# Patient Record
Sex: MALE | Race: WHITE | NOT HISPANIC OR LATINO | Employment: OTHER | URBAN - METROPOLITAN AREA
[De-identification: names, ages, dates, MRNs, and addresses within clinical notes are randomized per-mention and may not be internally consistent; named-entity substitution may affect disease eponyms.]

---

## 2017-08-12 ENCOUNTER — APPOINTMENT (OUTPATIENT)
Dept: CT IMAGING | Facility: CLINIC | Age: 37
End: 2017-08-12
Attending: EMERGENCY MEDICINE
Payer: COMMERCIAL

## 2017-08-12 ENCOUNTER — HOSPITAL ENCOUNTER (EMERGENCY)
Facility: CLINIC | Age: 37
Discharge: HOME | End: 2017-08-13
Attending: EMERGENCY MEDICINE | Admitting: EMERGENCY MEDICINE
Payer: COMMERCIAL

## 2017-08-12 DIAGNOSIS — R51.9 NONINTRACTABLE EPISODIC HEADACHE, UNSPECIFIED HEADACHE TYPE: ICD-10-CM

## 2017-08-12 PROCEDURE — 70450 CT HEAD/BRAIN W/O DYE: CPT

## 2017-08-12 PROCEDURE — 96372 THER/PROPH/DIAG INJ SC/IM: CPT

## 2017-08-12 PROCEDURE — 96361 HYDRATE IV INFUSION ADD-ON: CPT

## 2017-08-12 PROCEDURE — 96374 THER/PROPH/DIAG INJ IV PUSH: CPT

## 2017-08-12 PROCEDURE — 25000128 H RX IP 250 OP 636: Performed by: EMERGENCY MEDICINE

## 2017-08-12 PROCEDURE — 99285 EMERGENCY DEPT VISIT HI MDM: CPT | Mod: 25

## 2017-08-12 PROCEDURE — 96375 TX/PRO/DX INJ NEW DRUG ADDON: CPT

## 2017-08-12 RX ORDER — DIPHENHYDRAMINE HYDROCHLORIDE 50 MG/ML
25 INJECTION INTRAMUSCULAR; INTRAVENOUS ONCE
Status: COMPLETED | OUTPATIENT
Start: 2017-08-12 | End: 2017-08-12

## 2017-08-12 RX ORDER — METOCLOPRAMIDE HYDROCHLORIDE 5 MG/ML
10 INJECTION INTRAMUSCULAR; INTRAVENOUS ONCE
Status: COMPLETED | OUTPATIENT
Start: 2017-08-12 | End: 2017-08-12

## 2017-08-12 RX ORDER — KETOROLAC TROMETHAMINE 30 MG/ML
30 INJECTION, SOLUTION INTRAMUSCULAR; INTRAVENOUS ONCE
Status: COMPLETED | OUTPATIENT
Start: 2017-08-12 | End: 2017-08-12

## 2017-08-12 RX ADMIN — KETOROLAC TROMETHAMINE 30 MG: 30 INJECTION, SOLUTION INTRAMUSCULAR; INTRAVENOUS at 23:27

## 2017-08-12 RX ADMIN — METOCLOPRAMIDE 10 MG: 5 INJECTION, SOLUTION INTRAMUSCULAR; INTRAVENOUS at 23:27

## 2017-08-12 RX ADMIN — SODIUM CHLORIDE 1000 ML: 9 INJECTION, SOLUTION INTRAVENOUS at 23:26

## 2017-08-12 RX ADMIN — DIPHENHYDRAMINE HYDROCHLORIDE 25 MG: 50 INJECTION, SOLUTION INTRAMUSCULAR; INTRAVENOUS at 23:27

## 2017-08-12 NOTE — ED AVS SNAPSHOT
Emergency Department    [address] [facility] [address]    Phone:  [phone]    Fax:  [phone]                                       Carlos A Connor   MRN: [id number]    Department:   Emergency Department   Date of Visit:  8/12/2017           Patient Information     Date Of Birth          1980        Your diagnoses for this visit were:     Nonintractable episodic headache, unspecified headache type        You were seen by Johnathon Lr MD.      Follow-up Information     Follow up with Wegener, Joel Daniel Irwin, MD. Schedule an appointment as soon as possible for a visit in 1 week.    Specialty:  Family Practice    Contact information:    [address] [facility] [address] [phone]          Follow up with  Emergency Department.    Specialty:  EMERGENCY MEDICINE    Why:  If symptoms worsen    Contact information:    [address] [facility] [address] [phone]        Discharge Instructions          * HEADACHE [unspecified]    The cause of your headache today is not clear, but it does not appear to be the sign of any serious illness.  Under stress, some people tense the muscles of their shoulder, neck and scalp without knowing it. If this condition lasts long enough, a TENSION HEADACHE can occur.  A MIGRAINE HEADACHE is caused by changes in blood flow to the brain. It can be mild or severe. A migraine attack may be triggered by emotional stress, hormone changes during the menstrual cycle, oral contraceptives, alcohol use, certain foods containing tyramine, eye strain, weather changes, missing meals, lack of sleep or oversleeping.  Other causes of headache include a viral illness, sinus, ear or throat infection, dental pain and TMJ (jaw joint) pain.  HOME CARE:    If you were given pain medicine for this headache, do not drive yourself home. Arrange for a ride, instead. When you get home, try to sleep. You should feel much better when you wake  up.    If you are having nausea or vomiting, follow a light diet until your headache is relieved.    If you have a migraine type headache, use sunglasses when in the daylight or around bright indoor lighting until symptoms improve. Bright glaring light can worsen this kind of headache.  FOLLOW UP with your doctor if the headache is not better within the next 24 hours. If you have frequent headaches you should discuss a treatment plan with your primary care doctor. By being aware of the earliest signs of headache, and starting treatment right away, you may be able to stop the pain yourself.  GET PROMPT MEDICAL ATTENTION if any of the following occur:    Worsening of your head pain or no improvement within 24 hours    Repeated vomiting (unable to keep liquids down)    Fever over 101 F (38.3 C)    Stiff neck    Extreme drowsiness, confusion or fainting    Weakness of an arm or leg or one side of the face    Difficulty with speech or vision    [address] [place] [address]. All rights reserved. This information is not intended as a substitute for professional medical care. Always follow your healthcare professional's instructions.      24 Hour Appointment Hotline       To make an appointment at any [facility], call [phone] ([phone]). If you don't have a family doctor or clinic, we will help you find one. [facility] clinics are conveniently located to serve the needs of you and your family.             Review of your medicines      Our records show that you are taking the medicines listed below. If these are incorrect, please call your family doctor or clinic.        Dose / Directions Last dose taken    ibuprofen 200 MG tablet   Commonly known as:  ADVIL/MOTRIN   Dose:  200 mg   Quantity:  120 tablet        Take 1 tablet by mouth every 4 hours as needed for pain.   Refills:  0        LORazepam 0.5 MG tablet   Commonly known as:  ATIVAN        Refills:  0         oxyCODONE-acetaminophen 5-325 MG per tablet   Commonly known as:  PERCOCET   Dose:  1-2 tablet   Quantity:  25 tablet        Take 1-2 tablets by mouth every 4 hours as needed for pain   Refills:  0        PARoxetine 20 MG tablet   Commonly known as:  PAXIL   Dose:  20 mg   Quantity:  90 tablet        Take 1 tablet (20 mg) by mouth At Bedtime   Refills:  1        SUMAtriptan 25 MG tablet   Commonly known as:  IMITREX   Dose:  25-50 mg   Quantity:  9 tablet        Take 1-2 tablets (25-50 mg) by mouth at onset of headache for migraine May repeat dose in 2 hours.  Do not exceed 200 mg in 24 hours   Refills:  1                Procedures and tests performed during your visit     Head CT w/o contrast    Peripheral IV catheter      Orders Needing Specimen Collection     None      Pending Results     Date and Time Order Name Status Description    8/12/2017 2321 Head CT w/o contrast Preliminary             Pending Culture Results     No orders found for last 3 day(s).            Pending Results Instructions     If you had any lab results that were not finalized at the time of your Discharge, you can call the ED Lab Result RN at [phone]. You will be contacted by this team for any positive Lab results or changes in treatment. The nurses are available 7 days a week from 10A to 6:30P.  You can leave a message 24 hours per day and they will return your call.        Test Results From Your Hospital Stay        8/12/2017 11:57 PM      Narrative     CT HEAD W/O CONTRAST  8/12/2017 11:51 PM      HISTORY: Sudden head pain post lifting today.    TECHNIQUE: Routine noncontrast head CT. Radiation dose for this scan  was reduced using automated exposure control, adjustment of the mA  and/or kV according to patient size, or iterative reconstruction  technique.    COMPARISON: 3/24/2015.    FINDINGS: Brain volume is normal for age. No mass, mass effect or  intracranial hemorrhage. No evidence of acute infarct. The visualized  paranasal  sinuses are clear.        Impression     IMPRESSION: No acute abnormality.                Clinical Quality Measure: Blood Pressure Screening     Your blood pressure was checked while you were in the emergency department today. The last reading we obtained was  BP: (!) 135/94 . Please read the guidelines below about what these numbers mean and what you should do about them.  If your systolic blood pressure (the top number) is less than 120 and your diastolic blood pressure (the bottom number) is less than 80, then your blood pressure is normal. There is nothing more that you need to do about it.  If your systolic blood pressure (the top number) is 120-139 or your diastolic blood pressure (the bottom number) is 80-89, your blood pressure may be higher than it should be. You should have your blood pressure rechecked within a year by a primary care provider.  If your systolic blood pressure (the top number) is 140 or greater or your diastolic blood pressure (the bottom number) is 90 or greater, you may have high blood pressure. High blood pressure is treatable, but if left untreated over time it can put you at risk for heart attack, stroke, or kidney failure. You should have your blood pressure rechecked by a primary care provider within the next 4 weeks.  If your provider in the emergency department today gave you specific instructions to follow-up with your doctor or provider even sooner than that, you should follow that instruction and not wait for up to 4 weeks for your follow-up visit.        Thank you for choosing [facility]       Thank you for choosing [facility] for your care. Our goal is always to provide you with excellent care. Hearing back from our patients is one way we can continue to improve our services. Please take a few minutes to complete the written survey that you may receive in the mail after you visit with us. Thank you!        [facility] Information     [facility] lets you send messages to your doctor,  "view your test results, renew your prescriptions, schedule appointments and more. To sign up, go to [email or web address] . Click on \"Log in\" on the left side of the screen, which will take you to the Welcome page. Then click on \"Sign up Now\" on the right side of the page.     You will be asked to enter the access code listed below, as well as some personal information. Please follow the directions to create your username and password.     Your access code is: [email or web address]  Expires: 2017  1:06 AM     Your access code will  in 90 days. If you need help or a new code, please call your [place] clinic or [phone].        Care EveryWhere ID     This is your Care EveryWhere ID. This could be used by other organizations to access your [facility] medical records  [id number]        Equal Access to Services     HARPREET [facility] Center[facility]ESSEE : Harley Gonzalez, avani brennan, keith connolly, sierra bray . So [facility] [phone].    ATENCIÓN: Si habla español, tiene a corea disposición servicios gratuitos de asistencia lingüística. Llame al [phone].    We comply with applicable federal civil rights laws and [place] laws. We do not discriminate on the basis of race, color, national origin, age, disability sex, sexual orientation or gender identity.            After Visit Summary       This is your record. Keep this with you and show to your community pharmacist(s) and doctor(s) at your next visit.                  "

## 2017-08-12 NOTE — ED AVS SNAPSHOT
Emergency Department    [address] [facility] [address]    Phone:  [phone]    Fax:  [phone]                                       Carlos A Connor   MRN: [id number]    Department:   Emergency Department   Date of Visit:  8/12/2017           After Visit Summary Signature Page     I have received my discharge instructions, and my questions have been answered. I have discussed any challenges I see with this plan with the nurse or doctor.    ..........................................................................................................................................  Patient/Patient Representative Signature      ..........................................................................................................................................  Patient Representative Print Name and Relationship to Patient    ..................................................               ................................................  Date                                            Time    ..........................................................................................................................................  Reviewed by Signature/Title    ...................................................              ..............................................  Date                                                            Time

## 2017-08-13 VITALS
HEIGHT: 71 IN | SYSTOLIC BLOOD PRESSURE: 135 MMHG | WEIGHT: 205 LBS | OXYGEN SATURATION: 96 % | TEMPERATURE: 98.3 F | BODY MASS INDEX: 28.7 KG/M2 | RESPIRATION RATE: 16 BRPM | DIASTOLIC BLOOD PRESSURE: 94 MMHG

## 2017-08-13 PROCEDURE — 25000128 H RX IP 250 OP 636: Performed by: EMERGENCY MEDICINE

## 2017-08-13 RX ORDER — SUMATRIPTAN 6 MG/.5ML
6 INJECTION, SOLUTION SUBCUTANEOUS ONCE
Status: COMPLETED | OUTPATIENT
Start: 2017-08-13 | End: 2017-08-13

## 2017-08-13 RX ORDER — SUMATRIPTAN 6 MG/.5ML
6 INJECTION, SOLUTION SUBCUTANEOUS
Status: DISCONTINUED | OUTPATIENT
Start: 2017-08-13 | End: 2017-08-13 | Stop reason: HOSPADM

## 2017-08-13 RX ADMIN — SUMATRIPTAN SUCCINATE 6 MG: 6 INJECTION SUBCUTANEOUS at 00:28

## 2017-08-13 ASSESSMENT — ENCOUNTER SYMPTOMS
WEAKNESS: 0
NECK PAIN: 0
NAUSEA: 0
VOMITING: 0
HEADACHES: 1
LIGHT-HEADEDNESS: 0
PHOTOPHOBIA: 1
FEVER: 0
NUMBNESS: 0
DIZZINESS: 0
SPEECH DIFFICULTY: 0

## 2017-08-13 NOTE — ED PROVIDER NOTES
"  History     Chief Complaint:  Headache     HPI   Carlos A Connor is a 36 year old male with a history of migraines who presents for evaluation of a headache. The patient reports he was lifting something heavy at work this morning when he developed a pressure in his head and sensation of \"something cracking inside his head\". The headache has been gradually worsening throughout the day and he is concerned that it started while he was lifting something heavy, prompting him to come to the ED for evaluation. On arrival to the ED, the patient reports he has a frontal headache and pain across the back of his head. He has a history of migraines but reports this feels different from his typical migraine. The patient notes mild photophobia. He denies any nausea or vomiting. He denies any numbness, weakness, tingling, neck pain, vision changes, or balance issues. The patient took Ibuprofen this morning without significant relief. He denies any family history of aneurysm or head bleed. He is not anticoagulated.     Allergies:  Droperidol     Medications:    LORazepam (ATIVAN) 0.5 MG tablet  SUMAtriptan (IMITREX) 25 MG tablet  PARoxetine (PAXIL) 20 MG tablet  ibuprofen (ADVIL,MOTRIN) 200 MG tablet    Past Medical History:    Anxiety  Chest pain  Migraines    Past Surgical History:    History reviewed. No pertinent surgical history.    Family History:    History reviewed. No pertinent family history.     Social History:  Smoking status: Former smoker  Alcohol use: Rare  Marital Status:   [2]     Review of Systems   Constitutional: Negative for fever.   Eyes: Positive for photophobia. Negative for visual disturbance.   Gastrointestinal: Negative for nausea and vomiting.   Musculoskeletal: Negative for gait problem and neck pain.   Neurological: Positive for headaches. Negative for dizziness, speech difficulty, weakness, light-headedness and numbness.   All other systems reviewed and are negative.      Physical Exam " "  Patient Vitals for the past 24 hrs:   BP Temp Temp src Heart Rate Resp SpO2 Height Weight   08/13/17 0107 - - - - 16 - - -   08/13/17 0100 - - - - - 96 % - -   08/13/17 0048 (!) 135/94 - - - - 96 % - -   08/12/17 2250 (!) 150/102 98.3  F (36.8  C) Temporal 89 - 98 % 1.803 m (5' 11\") 93 kg (205 lb)       Physical Exam  Constitutional:  Appears well-developed and well-nourished. Cooperative.   HENT:   Head:    Atraumatic.   Mouth/Throat:   Oropharynx is without erythema or exudate and mucous     membranes are moist.   Eyes:    Conjunctivae normal and EOM are normal.      Pupils are equal, round, and reactive to light.   Neck:    Normal range of motion. Neck supple. No nuchal rigidity.   Cardiovascular:  Normal rate, regular rhythm, normal heart sounds and radial and    dorsalis pedis pulses are 2+ and symmetric.    Pulmonary/Chest:  Effort normal and breath sounds normal.   Abdominal:   Soft. Bowel sounds are normal.      No splenomegaly or hepatomegaly. No tenderness. No rebound.   Musculoskeletal:  Normal range of motion. No edema and no tenderness.   Neurological:  Alert. Normal strength. No cranial nerve deficit. GCS 15.     No photophobia. 5/5 strength in upper and lower extremities bilatearlly. Normal speech.   Skin:    Skin is warm and dry.   Psychiatric:   Normal mood and affect.     Emergency Department Course   Imaging:  Radiographic findings were communicated with the patient who voiced understanding of the findings.    CT Head w/o contrast  No acute abnormality   As read by Radiology.    Interventions:  2327: Toradol 30 mg IV  2327: NS 1L IV Bolus  2327: Reglan 10 mg IV  2327: Benadryl 25 mg IV  0028: Imitrex 6 mg subcutaneous     Emergency Department Course:  Past medical records, nursing notes, and vitals reviewed.  2314: I performed an exam of the patient and obtained history, as documented above.  The patient was sent for a head CT while in the emergency department, findings above.    0103: I " rechecked the patient. Explained findings to the patient. He is feeling improved.     I rechecked the patient.  Findings and plan explained to the Patient. Patient discharged home with instructions regarding supportive care, medications, and reasons to return. The importance of close follow-up was reviewed.     Impression & Plan      Medical Decision Making:  Carlos A Connor is a 36 year old male presented with a new onset headache. Headache started while the patient was lifting something heavy, so head CT was obtained due to concern for possible intracranial hemorrhage. There is no neck pain to suggest dissection.  CT did not demonstrate a mass, aneurysm, or stroke.  Evaluation in the emergency department, fortunately has been negative. The patient has not had any fever or neck stiffness so I doubt meningitis.  There is no associated numbness, paresthesia or confusion and I doubt stroke or CNS tumor. The patient was treated symptomatically and pain has improved with medication interventions.      As the patient has been improving and in light of the negative workup, the patient wishes to be discharged home. The patient should follow-up with the primary physician within 3 days. If the headache continues or the frequency increases, consultation with neurology will be indicated.  Return if increasing pain, fever, vomiting or weakness.  Full anticipatory guidance given prior to discharge.    Diagnosis:    ICD-10-CM   1. Nonintractable episodic headache, unspecified headache type R51     Disposition: Discharged to home    Pina Almonte  8/12/2017    EMERGENCY DEPARTMENT    I, Pina Almonte, am serving as a scribe at 11:14 PM on 8/12/2017 to document services personally performed by Johnathon Lr MD based on my observations and the provider's statements to me.        Johnathon Lr MD  08/13/17 0721

## 2017-08-13 NOTE — DISCHARGE INSTRUCTIONS
* HEADACHE [unspecified]    The cause of your headache today is not clear, but it does not appear to be the sign of any serious illness.  Under stress, some people tense the muscles of their shoulder, neck and scalp without knowing it. If this condition lasts long enough, a TENSION HEADACHE can occur.  A MIGRAINE HEADACHE is caused by changes in blood flow to the brain. It can be mild or severe. A migraine attack may be triggered by emotional stress, hormone changes during the menstrual cycle, oral contraceptives, alcohol use, certain foods containing tyramine, eye strain, weather changes, missing meals, lack of sleep or oversleeping.  Other causes of headache include a viral illness, sinus, ear or throat infection, dental pain and TMJ (jaw joint) pain.  HOME CARE:    If you were given pain medicine for this headache, do not drive yourself home. Arrange for a ride, instead. When you get home, try to sleep. You should feel much better when you wake up.    If you are having nausea or vomiting, follow a light diet until your headache is relieved.    If you have a migraine type headache, use sunglasses when in the daylight or around bright indoor lighting until symptoms improve. Bright glaring light can worsen this kind of headache.  FOLLOW UP with your doctor if the headache is not better within the next 24 hours. If you have frequent headaches you should discuss a treatment plan with your primary care doctor. By being aware of the earliest signs of headache, and starting treatment right away, you may be able to stop the pain yourself.  GET PROMPT MEDICAL ATTENTION if any of the following occur:    Worsening of your head pain or no improvement within 24 hours    Repeated vomiting (unable to keep liquids down)    Fever over 101 F (38.3 C)    Stiff neck    Extreme drowsiness, confusion or fainting    Weakness of an arm or leg or one side of the face    Difficulty with speech or vision    [address]  [place] [address]. All rights reserved. This information is not intended as a substitute for professional medical care. Always follow your healthcare professional's instructions.

## 2018-04-12 ENCOUNTER — HOSPITAL ENCOUNTER (EMERGENCY)
Facility: CLINIC | Age: 38
Discharge: HOME | End: 2018-04-12
Attending: EMERGENCY MEDICINE | Admitting: EMERGENCY MEDICINE
Payer: COMMERCIAL

## 2018-04-12 VITALS
DIASTOLIC BLOOD PRESSURE: 93 MMHG | TEMPERATURE: 98.8 F | BODY MASS INDEX: 29.4 KG/M2 | OXYGEN SATURATION: 95 % | SYSTOLIC BLOOD PRESSURE: 132 MMHG | RESPIRATION RATE: 20 BRPM | WEIGHT: 210 LBS | HEIGHT: 71 IN

## 2018-04-12 DIAGNOSIS — R11.10 VOMITING AND DIARRHEA: ICD-10-CM

## 2018-04-12 DIAGNOSIS — R19.7 VOMITING AND DIARRHEA: ICD-10-CM

## 2018-04-12 LAB
ALBUMIN SERPL-MCNC: 3.9 G/DL (ref 3.4–5)
ALP SERPL-CCNC: 86 U/L (ref 40–150)
ALT SERPL W P-5'-P-CCNC: 88 U/L (ref 0–70)
ANION GAP SERPL CALCULATED.3IONS-SCNC: 9 MMOL/L (ref 3–14)
AST SERPL W P-5'-P-CCNC: 36 U/L (ref 0–45)
BILIRUB SERPL-MCNC: 1.1 MG/DL (ref 0.2–1.3)
BUN SERPL-MCNC: 8 MG/DL (ref 7–30)
CALCIUM SERPL-MCNC: 8.5 MG/DL (ref 8.5–10.1)
CHLORIDE SERPL-SCNC: 106 MMOL/L (ref 94–109)
CO2 SERPL-SCNC: 24 MMOL/L (ref 20–32)
CREAT SERPL-MCNC: 0.64 MG/DL (ref 0.66–1.25)
ERYTHROCYTE [DISTWIDTH] IN BLOOD BY AUTOMATED COUNT: 13.1 % (ref 10–15)
GFR SERPL CREATININE-BSD FRML MDRD: >90 ML/MIN/1.7M2
GLUCOSE SERPL-MCNC: 103 MG/DL (ref 70–99)
HCT VFR BLD AUTO: 41.7 % (ref 40–53)
HGB BLD-MCNC: 14.3 G/DL (ref 13.3–17.7)
LIPASE SERPL-CCNC: 139 U/L (ref 73–393)
MCH RBC QN AUTO: 29.3 PG (ref 26.5–33)
MCHC RBC AUTO-ENTMCNC: 34.3 G/DL (ref 31.5–36.5)
MCV RBC AUTO: 86 FL (ref 78–100)
PLATELET # BLD AUTO: 185 10E9/L (ref 150–450)
POTASSIUM SERPL-SCNC: 3.3 MMOL/L (ref 3.4–5.3)
PROT SERPL-MCNC: 7.7 G/DL (ref 6.8–8.8)
RBC # BLD AUTO: 4.88 10E12/L (ref 4.4–5.9)
SODIUM SERPL-SCNC: 139 MMOL/L (ref 133–144)
WBC # BLD AUTO: 5.7 10E9/L (ref 4–11)

## 2018-04-12 PROCEDURE — 96374 THER/PROPH/DIAG INJ IV PUSH: CPT

## 2018-04-12 PROCEDURE — 83690 ASSAY OF LIPASE: CPT | Performed by: EMERGENCY MEDICINE

## 2018-04-12 PROCEDURE — 80053 COMPREHEN METABOLIC PANEL: CPT | Performed by: EMERGENCY MEDICINE

## 2018-04-12 PROCEDURE — 25000128 H RX IP 250 OP 636: Performed by: EMERGENCY MEDICINE

## 2018-04-12 PROCEDURE — 25000132 ZZH RX MED GY IP 250 OP 250 PS 637: Performed by: EMERGENCY MEDICINE

## 2018-04-12 PROCEDURE — 99284 EMERGENCY DEPT VISIT MOD MDM: CPT | Mod: 25

## 2018-04-12 PROCEDURE — 85027 COMPLETE CBC AUTOMATED: CPT | Performed by: EMERGENCY MEDICINE

## 2018-04-12 PROCEDURE — 96361 HYDRATE IV INFUSION ADD-ON: CPT

## 2018-04-12 RX ORDER — ONDANSETRON 4 MG/1
4 TABLET, ORALLY DISINTEGRATING ORAL EVERY 8 HOURS PRN
Qty: 10 TABLET | Refills: 0 | Status: SHIPPED | OUTPATIENT
Start: 2018-04-12 | End: 2018-04-15

## 2018-04-12 RX ORDER — POTASSIUM CHLORIDE 1500 MG/1
40 TABLET, EXTENDED RELEASE ORAL ONCE
Status: COMPLETED | OUTPATIENT
Start: 2018-04-12 | End: 2018-04-12

## 2018-04-12 RX ORDER — ONDANSETRON 2 MG/ML
4 INJECTION INTRAMUSCULAR; INTRAVENOUS ONCE
Status: COMPLETED | OUTPATIENT
Start: 2018-04-12 | End: 2018-04-12

## 2018-04-12 RX ADMIN — POTASSIUM CHLORIDE 40 MEQ: 1500 TABLET, EXTENDED RELEASE ORAL at 15:33

## 2018-04-12 RX ADMIN — ONDANSETRON 4 MG: 2 INJECTION INTRAMUSCULAR; INTRAVENOUS at 14:59

## 2018-04-12 RX ADMIN — SODIUM CHLORIDE 1000 ML: 9 INJECTION, SOLUTION INTRAVENOUS at 14:44

## 2018-04-12 ASSESSMENT — ENCOUNTER SYMPTOMS
HEADACHES: 1
NAUSEA: 1
FEVER: 1
DIARRHEA: 1
CHILLS: 1
DIZZINESS: 1
ABDOMINAL PAIN: 1
VOMITING: 1
DYSURIA: 0

## 2018-04-12 NOTE — ED AVS SNAPSHOT
Emergency Department    [address] [facility] [address]    Phone:  [phone]    Fax:  [phone]                                       Carlos A Connor   MRN: [id number]    Department:   Emergency Department   Date of Visit:  4/12/2018           Patient Information     Date Of Birth          1980        Your diagnoses for this visit were:     Vomiting and diarrhea        You were seen by Basia Bravo MD.      Follow-up Information     Schedule an appointment as soon as possible for a visit with Wegener, Joel Daniel Irwin, MD.    Specialty:  Family Practice    Contact information:    [address] [facility] [address] [phone]          Follow up with  Emergency Department.    Specialty:  EMERGENCY MEDICINE    Why:  If symptoms worsen    Contact information:    [address] [facility] [address] [phone]        Discharge Instructions       Use zofran for nausea  Keep hydrated at home  Can use imodium (over the counter) medicine for diarrhea if severe    Discharge Instructions  Vomiting    You have been seen today for vomiting (throwing up). This is usually caused by a virus, but some bacteria, parasites, medicines or other medical conditions can cause similar symptoms. At this time your provider does not find that your vomiting is a sign of anything dangerous or life-threatening. However, sometimes the signs of serious illness do not show up right away. If you have new or worse symptoms, you may need to be seen again in the Emergency Department or by your primary provider. Remember that serious problems like appendicitis can start as vomiting.    Generally, every Emergency Department visit should have a follow-up clinic visit with either a primary or a specialty clinic/provider. Please follow-up as instructed by your emergency provider today.    Return to the Emergency Department if:    You keep vomiting and you are not able to keep liquids  down.     You feel you are getting dehydrated, such as being very thirsty, not urinating (peeing) at least every 8-12 hours, or feeling faint or lightheaded.     You develop a new fever, or your fever continues for more than 2 days.     You have abdominal (belly pain) that seems worse than cramps, is in one spot, or is getting worse over time. Appendicitis usually causes pain in the right lower abdomen (to the right and below your belly button) so watch for pain in this location.    You have blood in your vomit or stools.     You feel very weak.    You are not starting to improve within 24 hours of your visit here.     What can I do to help myself?    The most important thing to do is to drink clear liquids. If you have been vomiting a lot, it is best to have only small, frequent sips of liquids. Drinking too much at once may cause more vomiting. If you are vomiting often, you must replace minerals, sodium and potassium lost with your illness. Pedialyte  is the best available rehydration liquid but some find that it doesn t taste good so sports drinks are an alterative. You can also drink clear liquids such as water, weak tea, apple juice, and 7-Up . Avoid acid liquids (orange), caffeine (coffee) or alcohol. Do not drink milk until you no longer have diarrhea (loose stools).     After liquids are staying down, you may start eating mild foods. Soda crackers, toast, plain noodles, gelatin, applesauce and bananas are good first choices. Avoid foods that have acid, are spicy, fatty or have a lot of fiber (such as meats, coarse grains, vegetables). You may start eating these foods again in about 3 days when you are better.     Sometimes treatment includes prescription medicine to prevent nausea (sick to your stomach) and vomiting. If your provider prescribes these for you, take them as directed.     Do not take ibuprofen, naproxen, or other nonsteroidal anti-inflammatory (NSAID) medicines without checking with your  healthcare provider.     If you were given a prescription for medicine here today, be sure to read all of the information (including the package insert) that comes with your prescription.  This will include important information about the medicine, its side effects, and any warnings that you need to know about.  The pharmacist who fills the prescription can provide more information and answer questions you may have about the medicine.  If you have questions or concerns that the pharmacist cannot address, please call or return to the Emergency Department.     Remember that you can always come back to the Emergency Department if you are not able to see your regular provider in the amount of time listed above, if you get any new symptoms, or if there is anything that worries you.      Your next 10 appointments already scheduled     Apr 18, 2018  3:00 PM CDT   Office Visit with TERRA Cabrera CNP   [facility]    [address] [place] [address] [phone]           Bring a current list of meds and any records pertaining to this visit. For Physicals, please bring immunization records and any forms needing to be filled out. Please arrive 10 minutes early to complete paperwork.              24 Hour Appointment Hotline       To make an appointment at any [facility], call [phone] ([phone]). If you don't have a family doctor or clinic, we will help you find one. [facility] are conveniently located to serve the needs of you and your family.             Review of your medicines      START taking        Dose / Directions Last dose taken    ondansetron 4 MG ODT tab   Commonly known as:  ZOFRAN ODT   Dose:  4 mg   Quantity:  10 tablet        Take 1 tablet (4 mg) by mouth every 8 hours as needed   Refills:  0          Our records show that you are taking the medicines listed below. If these are incorrect, please call your family  doctor or clinic.        Dose / Directions Last dose taken    TYLENOL 325 MG Caps   Generic drug:  Acetaminophen        Refills:  0                Prescriptions were sent or printed at these locations (1 Prescription)                   Other Prescriptions                Printed at Department/Unit printer (1 of 1)         ondansetron (ZOFRAN ODT) 4 MG ODT tab                Procedures and tests performed during your visit     CBC (+ platelets, no diff)    Comprehensive metabolic panel    Lipase      Orders Needing Specimen Collection     None      Pending Results     No orders found from 4/10/2018 to 4/13/2018.            Pending Culture Results     No orders found from 4/10/2018 to 4/13/2018.            Pending Results Instructions     If you had any lab results that were not finalized at the time of your Discharge, you can call the ED Lab Result RN at [phone]. You will be contacted by this team for any positive Lab results or changes in treatment. The nurses are available 7 days a week from 10A to 6:30P.  You can leave a message 24 hours per day and they will return your call.        Test Results From Your Hospital Stay        4/12/2018  2:59 PM      Component Results     Component Value Ref Range & Units Status    WBC 5.7 4.0 - 11.0 10e9/L Final    RBC Count 4.88 4.4 - 5.9 10e12/L Final    Hemoglobin 14.3 13.3 - 17.7 g/dL Final    Hematocrit 41.7 40.0 - 53.0 % Final    MCV 86 78 - 100 fl Final    MCH 29.3 26.5 - 33.0 pg Final    MCHC 34.3 31.5 - 36.5 g/dL Final    RDW 13.1 10.0 - 15.0 % Final    Platelet Count 185 150 - 450 10e9/L Final         4/12/2018  3:15 PM      Component Results     Component Value Ref Range & Units Status    Sodium 139 133 - 144 mmol/L Final    Potassium 3.3 (L) 3.4 - 5.3 mmol/L Final    Chloride 106 94 - 109 mmol/L Final    Carbon Dioxide 24 20 - 32 mmol/L Final    Anion Gap 9 3 - 14 mmol/L Final    Glucose 103 (H) 70 - 99 mg/dL Final    Urea Nitrogen 8 7 - 30 mg/dL Final     Creatinine 0.64 (L) 0.66 - 1.25 mg/dL Final    GFR Estimate >90 >60 mL/min/1.7m2 Final    Non  GFR Calc    GFR Estimate If Black >90 >60 mL/min/1.7m2 Final    African American GFR Calc    Calcium 8.5 8.5 - 10.1 mg/dL Final    Bilirubin Total 1.1 0.2 - 1.3 mg/dL Final    Albumin 3.9 3.4 - 5.0 g/dL Final    Protein Total 7.7 6.8 - 8.8 g/dL Final    Alkaline Phosphatase 86 40 - 150 U/L Final    ALT 88 (H) 0 - 70 U/L Final    AST 36 0 - 45 U/L Final         4/12/2018  3:13 PM      Component Results     Component Value Ref Range & Units Status    Lipase 139 73 - 393 U/L Final                Clinical Quality Measure: Blood Pressure Screening     Your blood pressure was checked while you were in the emergency department today. The last reading we obtained was  BP: (!) 132/93 . Please read the guidelines below about what these numbers mean and what you should do about them.  If your systolic blood pressure (the top number) is less than 120 and your diastolic blood pressure (the bottom number) is less than 80, then your blood pressure is normal. There is nothing more that you need to do about it.  If your systolic blood pressure (the top number) is 120-139 or your diastolic blood pressure (the bottom number) is 80-89, your blood pressure may be higher than it should be. You should have your blood pressure rechecked within a year by a primary care provider.  If your systolic blood pressure (the top number) is 140 or greater or your diastolic blood pressure (the bottom number) is 90 or greater, you may have high blood pressure. High blood pressure is treatable, but if left untreated over time it can put you at risk for heart attack, stroke, or kidney failure. You should have your blood pressure rechecked by a primary care provider within the next 4 weeks.  If your provider in the emergency department today gave you specific instructions to follow-up with your doctor or provider even sooner than that, you should  "follow that instruction and not wait for up to 4 weeks for your follow-up visit.        Thank you for choosing [facility]       Thank you for choosing [facility] for your care. Our goal is always to provide you with excellent care. Hearing back from our patients is one way we can continue to improve our services. Please take a few minutes to complete the written survey that you may receive in the mail after you visit with us. Thank you!        [employer] Information     [employer] lets you send messages to your doctor, view your test results, renew your prescriptions, schedule appointments and more. To sign up, go to [email or web address] . Click on \"Log in\" on the left side of the screen, which will take you to the Welcome page. Then click on \"Sign up Now\" on the right side of the page.     You will be asked to enter the access code listed below, as well as some personal information. Please follow the directions to create your username and password.     Your access code is: [email or web address]  Expires: 2018  3:37 PM     Your access code will  in 90 days. If you need help or a new code, please call your [facility] clinic or [phone].        Care EveryWhere ID     This is your Care EveryWhere ID. This could be used by other organizations to access your [facility] medical records  [id number]        Equal Access to Services     ALYSIA CLIFFORD : Harley Gonzalez, waaxda luqadaha, qaybta kaalmada mohsen, sierra bray . So [facility] [phone].    ATENCIÓN: Si habla español, tiene a corea disposición servicios gratuitos de asistencia lingüística. Llame al [phone].    We comply with applicable federal civil rights laws and [place] laws. We do not discriminate on the basis of race, color, national origin, age, disability, sex, sexual orientation, or gender identity.            After Visit Summary       This is your record. Keep this with you and show to your community pharmacist(s) " and doctor(s) at your next visit.

## 2018-04-12 NOTE — DISCHARGE INSTRUCTIONS
Use zofran for nausea  Keep hydrated at home  Can use imodium (over the counter) medicine for diarrhea if severe    Discharge Instructions  Vomiting    You have been seen today for vomiting (throwing up). This is usually caused by a virus, but some bacteria, parasites, medicines or other medical conditions can cause similar symptoms. At this time your provider does not find that your vomiting is a sign of anything dangerous or life-threatening. However, sometimes the signs of serious illness do not show up right away. If you have new or worse symptoms, you may need to be seen again in the Emergency Department or by your primary provider. Remember that serious problems like appendicitis can start as vomiting.    Generally, every Emergency Department visit should have a follow-up clinic visit with either a primary or a specialty clinic/provider. Please follow-up as instructed by your emergency provider today.    Return to the Emergency Department if:    You keep vomiting and you are not able to keep liquids down.     You feel you are getting dehydrated, such as being very thirsty, not urinating (peeing) at least every 8-12 hours, or feeling faint or lightheaded.     You develop a new fever, or your fever continues for more than 2 days.     You have abdominal (belly pain) that seems worse than cramps, is in one spot, or is getting worse over time. Appendicitis usually causes pain in the right lower abdomen (to the right and below your belly button) so watch for pain in this location.    You have blood in your vomit or stools.     You feel very weak.    You are not starting to improve within 24 hours of your visit here.     What can I do to help myself?    The most important thing to do is to drink clear liquids. If you have been vomiting a lot, it is best to have only small, frequent sips of liquids. Drinking too much at once may cause more vomiting. If you are vomiting often, you must replace minerals, sodium and  potassium lost with your illness. Pedialyte  is the best available rehydration liquid but some find that it doesn t taste good so sports drinks are an alterative. You can also drink clear liquids such as water, weak tea, apple juice, and 7-Up . Avoid acid liquids (orange), caffeine (coffee) or alcohol. Do not drink milk until you no longer have diarrhea (loose stools).     After liquids are staying down, you may start eating mild foods. Soda crackers, toast, plain noodles, gelatin, applesauce and bananas are good first choices. Avoid foods that have acid, are spicy, fatty or have a lot of fiber (such as meats, coarse grains, vegetables). You may start eating these foods again in about 3 days when you are better.     Sometimes treatment includes prescription medicine to prevent nausea (sick to your stomach) and vomiting. If your provider prescribes these for you, take them as directed.     Do not take ibuprofen, naproxen, or other nonsteroidal anti-inflammatory (NSAID) medicines without checking with your healthcare provider.     If you were given a prescription for medicine here today, be sure to read all of the information (including the package insert) that comes with your prescription.  This will include important information about the medicine, its side effects, and any warnings that you need to know about.  The pharmacist who fills the prescription can provide more information and answer questions you may have about the medicine.  If you have questions or concerns that the pharmacist cannot address, please call or return to the Emergency Department.     Remember that you can always come back to the Emergency Department if you are not able to see your regular provider in the amount of time listed above, if you get any new symptoms, or if there is anything that worries you.

## 2018-04-12 NOTE — ED AVS SNAPSHOT
Emergency Department    [address] [facility] [address]    Phone:  [phone]    Fax:  [phone]                                       Carlos A Connor   MRN: [id number]    Department:   Emergency Department   Date of Visit:  4/12/2018           After Visit Summary Signature Page     I have received my discharge instructions, and my questions have been answered. I have discussed any challenges I see with this plan with the nurse or doctor.    ..........................................................................................................................................  Patient/Patient Representative Signature      ..........................................................................................................................................  Patient Representative Print Name and Relationship to Patient    ..................................................               ................................................  Date                                            Time    ..........................................................................................................................................  Reviewed by Signature/Title    ...................................................              ..............................................  Date                                                            Time

## 2018-04-12 NOTE — ED PROVIDER NOTES
"  History     Chief Complaint:  Abdominal Pain     The history is provided by the patient.      Carlos A Connor is a 37 year old male who presents with abdominal pain. The patient developed a chills and pain in the left side of his abdomen 2 days ago. He also had vomiting and diarrhea, with approximately 10 episodes of diarrhea since the onset of his symptoms. The patient also endorses associated chills, headache, and dizziness. He states that he has been able to keep down fluids but he has had a decreased appetite. 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 He denies any dysuria, recent antibiotic use, recent travel, or other medical concerns.     Allergies:  Droperidol      Medications:    The patient is not currently taking any prescribed medications.     Past Medical History:    Anxiety  Migraines    Past Surgical History:    History reviewed. No pertinent surgical history.     Family History:    History reviewed. No pertinent family history.      Social History:  Presents alone   Tobacco use: Former smoker  Alcohol use: Occasional  PCP: Joel Daniel Wegener    Marital Status:      Review of Systems   Constitutional: Positive for chills and fever (Subjective).   Gastrointestinal: Positive for abdominal pain, diarrhea, nausea and vomiting.   Genitourinary: Negative for dysuria.   Neurological: Positive for dizziness and headaches.   All other systems reviewed and are negative.    Physical Exam     Patient Vitals for the past 24 hrs:   BP Temp Temp src Heart Rate Resp SpO2 Height Weight   04/12/18 1535 (!) 132/93 - - 81 20 - - -   04/12/18 1500 - - - 74 16 95 % - -   04/12/18 1445 138/90 - - 63 - 96 % - -   04/12/18 1249 135/84 98.8  F (37.1  C) Oral 76 18 97 % 1.803 m (5' 11\") 95.3 kg (210 lb)      Physical Exam  General: Resting comfortably on the gurney  Eyes:  The pupils are equal and round    Conjunctivae " and sclerae are normal  ENT:    Moist mucous membranes  Neck:  Normal range of motion  CV:  Regular rate and rhythm    Skin warm and well perfused   Resp:  Lungs are clear    Non-labored    No rales    No wheezing   GI:  Abdomen is soft, there is no rigidity, no tenderness    No distension    No rebound tenderness, no guarding    No abdominal tenderness  MS:  Normal muscular tone  Skin:  No rash or acute skin lesions noted  Neuro:   Awake, alert.      Speech is normal and fluent.    Face is symmetric.     Moves all extremities  Psych:  Normal affect.  Appropriate interactions.     Emergency Department Course   Laboratory:  CBC: WNL (WBC 5.7, HGB 14.3, )    CMP: Creatinine 0.64 (L), Glucose 103 (H), Potassium 3.3 (L), ALT 88 (H), o/w WNL   Lipase: 139     Interventions:  1444: NS 1L IV Bolus   1459: Zofran 4mg IV injection    1533: K-dur 40 mEq PO    Emergency Department Course:  Past medical records, nursing notes, and vitals reviewed.  1448: I performed an exam of the patient and obtained history, as documented above.     1518: I rechecked the patient. Patient is feeling improved and nausea resolved. Findings and plan explained to the Patient. Patient discharged home with instructions regarding supportive care, medications, and reasons to return. The importance of close follow-up was reviewed.      I personally reviewed the laboratory results with the Patient and answered all related questions prior to discharge.      Impression & Plan    Medical Decision Making:  Carlos A Connor is a 37 year old male who presented to the emergency department with abdominal pain. No abdominal tenderness of exam and his vital signs are normal. I suspect that this is likely a viral illness given that he had exposure to a coworker who had the same symptoms. He is already starting to feel better than yesterday. No abdominal tenderness on exam so unlikely appendicitis, bowel obstruction, diverticulitis, cholecystitis, or  other acute intraabdominal pathology. Laboratory studies obtained and slight hypokalemia and slightly elevated ALT. Doubt gallbladder/liver pathology. Patient is feeling better here in the emergency department and the nausea resolved and he is able to tolerate oral intact. Given no abdominal tenderness on exam, don't think advanced imaging is indicated. Recommended symptomatic treatment at home and follow up with primary care provider if not improving. Reasons to return to the ED were discussed with the patient.    Diagnosis:    ICD-10-CM    1. Vomiting and diarrhea R11.10     R19.7        Disposition:  Discharged to home with plan as outlined.    Discharge Medications:  Discharge Medication List as of 4/12/2018  3:37 PM      START taking these medications    Details   ondansetron (ZOFRAN ODT) 4 MG ODT tab Take 1 tablet (4 mg) by mouth every 8 hours as needed, Disp-10 tablet, R-0, Local Print             Manuel Winn  4/12/2018    EMERGENCY DEPARTMENT  ONDINA, Manuel Winn, am serving as a scribe at 2:48 PM on 4/12/2018 to document services personally performed by Basia Bravo MD based on my observations and the provider's statements to me.       Basia Bravo MD  04/13/18 0033

## 2018-05-19 ENCOUNTER — HOSPITAL ENCOUNTER (OUTPATIENT)
Facility: CLINIC | Age: 38
Setting detail: OBSERVATION
Discharge: HOME | End: 2018-05-20
Attending: EMERGENCY MEDICINE | Admitting: HOSPITALIST
Payer: COMMERCIAL

## 2018-05-19 DIAGNOSIS — R00.0 TACHYCARDIA: ICD-10-CM

## 2018-05-19 DIAGNOSIS — F19.10 SUBSTANCE ABUSE (H): ICD-10-CM

## 2018-05-19 PROBLEM — F14.10 COCAINE ABUSE (H): Status: ACTIVE | Noted: 2018-05-19

## 2018-05-19 LAB
ALBUMIN SERPL-MCNC: 4.4 G/DL (ref 3.4–5)
ALCOHOL BREATH TEST: 0 (ref 0–0.01)
ALP SERPL-CCNC: 99 U/L (ref 40–150)
ALT SERPL W P-5'-P-CCNC: 77 U/L (ref 0–70)
AMPHETAMINES UR QL SCN: POSITIVE
ANION GAP SERPL CALCULATED.3IONS-SCNC: 13 MMOL/L (ref 3–14)
AST SERPL W P-5'-P-CCNC: 20 U/L (ref 0–45)
BARBITURATES UR QL: NEGATIVE
BASOPHILS # BLD AUTO: 0 10E9/L (ref 0–0.2)
BASOPHILS NFR BLD AUTO: 0.2 %
BENZODIAZ UR QL: NEGATIVE
BILIRUB DIRECT SERPL-MCNC: 0.2 MG/DL (ref 0–0.2)
BILIRUB SERPL-MCNC: 1 MG/DL (ref 0.2–1.3)
BUN SERPL-MCNC: 8 MG/DL (ref 7–30)
CALCIUM SERPL-MCNC: 9 MG/DL (ref 8.5–10.1)
CANNABINOIDS UR QL SCN: NEGATIVE
CHLORIDE SERPL-SCNC: 102 MMOL/L (ref 94–109)
CO2 SERPL-SCNC: 22 MMOL/L (ref 20–32)
COCAINE UR QL: POSITIVE
CREAT SERPL-MCNC: 0.6 MG/DL (ref 0.66–1.25)
DIFFERENTIAL METHOD BLD: NORMAL
EOSINOPHIL # BLD AUTO: 0 10E9/L (ref 0–0.7)
EOSINOPHIL NFR BLD AUTO: 0 %
ERYTHROCYTE [DISTWIDTH] IN BLOOD BY AUTOMATED COUNT: 13.4 % (ref 10–15)
GFR SERPL CREATININE-BSD FRML MDRD: >90 ML/MIN/1.7M2
GLUCOSE SERPL-MCNC: 135 MG/DL (ref 70–99)
HCT VFR BLD AUTO: 45.1 % (ref 40–53)
HGB BLD-MCNC: 15.4 G/DL (ref 13.3–17.7)
IMM GRANULOCYTES # BLD: 0.1 10E9/L (ref 0–0.4)
IMM GRANULOCYTES NFR BLD: 0.5 %
INTERPRETATION ECG - MUSE: NORMAL
LYMPHOCYTES # BLD AUTO: 2.1 10E9/L (ref 0.8–5.3)
LYMPHOCYTES NFR BLD AUTO: 21.6 %
MCH RBC QN AUTO: 29.3 PG (ref 26.5–33)
MCHC RBC AUTO-ENTMCNC: 34.1 G/DL (ref 31.5–36.5)
MCV RBC AUTO: 86 FL (ref 78–100)
MONOCYTES # BLD AUTO: 0.6 10E9/L (ref 0–1.3)
MONOCYTES NFR BLD AUTO: 6.2 %
NEUTROPHILS # BLD AUTO: 6.8 10E9/L (ref 1.6–8.3)
NEUTROPHILS NFR BLD AUTO: 71.5 %
NRBC # BLD AUTO: 0 10*3/UL
NRBC BLD AUTO-RTO: 0 /100
OPIATES UR QL SCN: NEGATIVE
PCP UR QL SCN: NEGATIVE
PLATELET # BLD AUTO: 222 10E9/L (ref 150–450)
POTASSIUM SERPL-SCNC: 3.7 MMOL/L (ref 3.4–5.3)
PROT SERPL-MCNC: 8.7 G/DL (ref 6.8–8.8)
RBC # BLD AUTO: 5.25 10E12/L (ref 4.4–5.9)
SODIUM SERPL-SCNC: 137 MMOL/L (ref 133–144)
TSH SERPL DL<=0.005 MIU/L-ACNC: 1 MU/L (ref 0.4–4)
WBC # BLD AUTO: 9.6 10E9/L (ref 4–11)

## 2018-05-19 PROCEDURE — 96376 TX/PRO/DX INJ SAME DRUG ADON: CPT

## 2018-05-19 PROCEDURE — G0378 HOSPITAL OBSERVATION PER HR: HCPCS

## 2018-05-19 PROCEDURE — 80076 HEPATIC FUNCTION PANEL: CPT | Performed by: EMERGENCY MEDICINE

## 2018-05-19 PROCEDURE — 25000132 ZZH RX MED GY IP 250 OP 250 PS 637: Performed by: HOSPITALIST

## 2018-05-19 PROCEDURE — 99220 ZZC INITIAL OBSERVATION CARE,LEVL III: CPT | Performed by: HOSPITALIST

## 2018-05-19 PROCEDURE — 80048 BASIC METABOLIC PNL TOTAL CA: CPT | Performed by: EMERGENCY MEDICINE

## 2018-05-19 PROCEDURE — 85025 COMPLETE CBC W/AUTO DIFF WBC: CPT | Performed by: EMERGENCY MEDICINE

## 2018-05-19 PROCEDURE — 82075 ASSAY OF BREATH ETHANOL: CPT

## 2018-05-19 PROCEDURE — 84443 ASSAY THYROID STIM HORMONE: CPT | Performed by: EMERGENCY MEDICINE

## 2018-05-19 PROCEDURE — 25000128 H RX IP 250 OP 636: Performed by: EMERGENCY MEDICINE

## 2018-05-19 PROCEDURE — 93005 ELECTROCARDIOGRAM TRACING: CPT

## 2018-05-19 PROCEDURE — 96374 THER/PROPH/DIAG INJ IV PUSH: CPT

## 2018-05-19 PROCEDURE — 99285 EMERGENCY DEPT VISIT HI MDM: CPT | Mod: 25

## 2018-05-19 PROCEDURE — 25000128 H RX IP 250 OP 636: Performed by: HOSPITALIST

## 2018-05-19 PROCEDURE — 96361 HYDRATE IV INFUSION ADD-ON: CPT

## 2018-05-19 PROCEDURE — 80307 DRUG TEST PRSMV CHEM ANLYZR: CPT | Performed by: EMERGENCY MEDICINE

## 2018-05-19 RX ORDER — LORAZEPAM 0.5 MG/1
0.25 TABLET ORAL DAILY PRN
COMMUNITY

## 2018-05-19 RX ORDER — LORAZEPAM 2 MG/ML
1 INJECTION INTRAMUSCULAR ONCE
Status: COMPLETED | OUTPATIENT
Start: 2018-05-19 | End: 2018-05-19

## 2018-05-19 RX ORDER — AMOXICILLIN 250 MG
2 CAPSULE ORAL 2 TIMES DAILY PRN
Status: DISCONTINUED | OUTPATIENT
Start: 2018-05-19 | End: 2018-05-20 | Stop reason: HOSPADM

## 2018-05-19 RX ORDER — PAROXETINE 20 MG/1
20 TABLET, FILM COATED ORAL DAILY
Status: DISCONTINUED | OUTPATIENT
Start: 2018-05-19 | End: 2018-05-20 | Stop reason: HOSPADM

## 2018-05-19 RX ORDER — ACETAMINOPHEN 325 MG/1
650 TABLET ORAL EVERY 4 HOURS PRN
Status: DISCONTINUED | OUTPATIENT
Start: 2018-05-19 | End: 2018-05-20 | Stop reason: HOSPADM

## 2018-05-19 RX ORDER — LIDOCAINE 40 MG/G
CREAM TOPICAL
Status: DISCONTINUED | OUTPATIENT
Start: 2018-05-19 | End: 2018-05-20 | Stop reason: HOSPADM

## 2018-05-19 RX ORDER — ONDANSETRON 4 MG/1
4 TABLET, ORALLY DISINTEGRATING ORAL EVERY 6 HOURS PRN
Status: DISCONTINUED | OUTPATIENT
Start: 2018-05-19 | End: 2018-05-20 | Stop reason: HOSPADM

## 2018-05-19 RX ORDER — NALOXONE HYDROCHLORIDE 0.4 MG/ML
.1-.4 INJECTION, SOLUTION INTRAMUSCULAR; INTRAVENOUS; SUBCUTANEOUS
Status: DISCONTINUED | OUTPATIENT
Start: 2018-05-19 | End: 2018-05-20 | Stop reason: HOSPADM

## 2018-05-19 RX ORDER — SODIUM CHLORIDE 9 MG/ML
INJECTION, SOLUTION INTRAVENOUS CONTINUOUS
Status: DISCONTINUED | OUTPATIENT
Start: 2018-05-19 | End: 2018-05-20 | Stop reason: HOSPADM

## 2018-05-19 RX ORDER — POLYETHYLENE GLYCOL 3350 17 G/17G
17 POWDER, FOR SOLUTION ORAL DAILY PRN
Status: DISCONTINUED | OUTPATIENT
Start: 2018-05-19 | End: 2018-05-20 | Stop reason: HOSPADM

## 2018-05-19 RX ORDER — AMOXICILLIN 250 MG
1 CAPSULE ORAL 2 TIMES DAILY PRN
Status: DISCONTINUED | OUTPATIENT
Start: 2018-05-19 | End: 2018-05-20 | Stop reason: HOSPADM

## 2018-05-19 RX ORDER — ONDANSETRON 2 MG/ML
4 INJECTION INTRAMUSCULAR; INTRAVENOUS EVERY 6 HOURS PRN
Status: DISCONTINUED | OUTPATIENT
Start: 2018-05-19 | End: 2018-05-20 | Stop reason: HOSPADM

## 2018-05-19 RX ORDER — ACETAMINOPHEN 650 MG/1
650 SUPPOSITORY RECTAL EVERY 4 HOURS PRN
Status: DISCONTINUED | OUTPATIENT
Start: 2018-05-19 | End: 2018-05-20 | Stop reason: HOSPADM

## 2018-05-19 RX ORDER — LORAZEPAM 0.5 MG/1
0.25 TABLET ORAL DAILY PRN
Status: DISCONTINUED | OUTPATIENT
Start: 2018-05-19 | End: 2018-05-20 | Stop reason: HOSPADM

## 2018-05-19 RX ADMIN — SODIUM CHLORIDE 1000 ML: 9 INJECTION, SOLUTION INTRAVENOUS at 13:05

## 2018-05-19 RX ADMIN — LORAZEPAM 1 MG: 2 INJECTION INTRAMUSCULAR; INTRAVENOUS at 11:40

## 2018-05-19 RX ADMIN — PAROXETINE HYDROCHLORIDE 20 MG: 20 TABLET, FILM COATED ORAL at 17:49

## 2018-05-19 RX ADMIN — SODIUM CHLORIDE 1000 ML: 9 INJECTION, SOLUTION INTRAVENOUS at 11:38

## 2018-05-19 RX ADMIN — LORAZEPAM 0.25 MG: 0.5 TABLET ORAL at 17:54

## 2018-05-19 RX ADMIN — SODIUM CHLORIDE: 9 INJECTION, SOLUTION INTRAVENOUS at 17:46

## 2018-05-19 RX ADMIN — LORAZEPAM 1 MG: 2 INJECTION INTRAMUSCULAR; INTRAVENOUS at 13:30

## 2018-05-19 ASSESSMENT — ENCOUNTER SYMPTOMS: SHORTNESS OF BREATH: 1

## 2018-05-19 NOTE — DISCHARGE INSTRUCTIONS
Clothing, slippers, cell phone/, watch and wallet, kept with pt, declined security. No home medications.

## 2018-05-19 NOTE — IP AVS SNAPSHOT
[facility] [address] [facility]    [address] [place] [address]    Phone:  [phone]                                       After Visit Summary   5/19/2018    Carlos A Connor    MRN: [id number]           After Visit Summary Signature Page     I have received my discharge instructions, and my questions have been answered. I have discussed any challenges I see with this plan with the nurse or doctor.    ..........................................................................................................................................  Patient/Patient Representative Signature      ..........................................................................................................................................  Patient Representative Print Name and Relationship to Patient    ..................................................               ................................................  Date                                            Time    ..........................................................................................................................................  Reviewed by Signature/Title    ...................................................              ..............................................  Date                                                            Time

## 2018-05-19 NOTE — PHARMACY-ADMISSION MEDICATION HISTORY
Admission medication history interview status for the 5/19/2018  admission is complete. See EPIC admission navigator for prior to admission medications     Medication history source reliability:Good    Actions taken by pharmacist (provider contacted, etc): Interviewed patient     Additional medication history information not noted on PTA med list :None    Medication reconciliation/reorder completed by provider prior to medication history? No    Time spent in this activity: 10 minutes    Prior to Admission medications    Medication Sig Last Dose Taking? Auth Provider   LORazepam (ATIVAN PO) Take 0.25 mg by mouth daily as needed  5/19/2018 at Unknown time Yes Reported, Patient

## 2018-05-19 NOTE — ED NOTES
"[facility]  ED Nurse Handoff Report    ED Chief complaint: Shortness of Breath (pt sts last night he was drinking heavily and put \"white powder\" in his nose. he woke up with cp and sob)      ED Diagnosis:   Final diagnoses:   None       Code Status: Full Code    Allergies:   Allergies   Allergen Reactions     Droperidol        Activity level - Baseline/Home:  Independent    Activity Level - Current:   Independent     Needed?: No    Isolation: No  Infection: Not Applicable    Bariatric?: No    Vital Signs:   Vitals:    05/19/18 1445 05/19/18 1500 05/19/18 1515 05/19/18 1530   BP:  (!) 174/106  (!) 158/98   Pulse:       Resp: 20 19 10 28   Temp:       TempSrc:       SpO2: 98% 97%     Weight:       Height:           Cardiac Rhythm: ,   Cardiac  Cardiac Rhythm: Sinus tachycardia    Pain level: 0-10 Pain Scale: 8    Is this patient confused?: No   Suicidality: Screened negative    Patient Report: Initial Complaint: patient woke up with chest pain and SOB this am, drank heavily last night and snorted a white powdery substance  Focused Assessment: AOx3, tachycardic in 120s, diaphoretic. Elevated BPs. Afebrile. States his CP and SOB has improved some. Heart rate did not respond to IV fluids. Tolerating sips of water. Voids without difficulty.   Tests Performed: labs, UTOX, ekg   Abnormal Results: UTOX positive for methamphetamines and cocaine.     Treatments provided: 2mg IV ativan, 2000cc bolus    Family Comments: no one present    OBS brochure/video discussed/provided to patient: Yes    ED Medications:   Medications   LORazepam (ATIVAN) injection 1 mg (1 mg Intravenous Given 5/19/18 1140)   0.9% sodium chloride BOLUS (0 mLs Intravenous Stopped 5/19/18 1212)   0.9% sodium chloride BOLUS (0 mLs Intravenous Stopped 5/19/18 1523)   LORazepam (ATIVAN) injection 1 mg (1 mg Intravenous Given 5/19/18 1330)       Drips infusing?:  No    For the majority of the shift this patient was Green. "   Interventions performed were frequent rounding.    Severe Sepsis OR Septic Shock Diagnosis Present: No      ED NURSE PHONE NUMBER: *[phone]

## 2018-05-19 NOTE — PROGRESS NOTES
RECEIVING UNIT ED HANDOFF REVIEW    ED Nurse Handoff Report was reviewed by: Susan Marshall on May 19, 2018 at 5:18 PM

## 2018-05-19 NOTE — IP AVS SNAPSHOT
MRN:[id number]                      After Visit Summary   5/19/2018    Carlos A Connor    MRN: [id number]           Thank you!     Thank you for choosing [facility] for your care. Our goal is always to provide you with excellent care. Hearing back from our patients is one way we can continue to improve our services. Please take a few minutes to complete the written survey that you may receive in the mail after you visit with us. Thank you!        Patient Information     Date Of Birth          1980        About your hospital stay     You were admitted on:  May 19, 2018 You last received care in the:  [facility] [address] [facility]    You were discharged on:  May 20, 2018        Reason for your hospital stay       Palpitations and lightheadedness, due to cocaine use.                  Who to Call     For medical emergencies, please call 911.  For non-urgent questions about your medical care, please call your primary care provider or clinic, [phone]          Attending Provider     Provider Specialty    Linda Hernandez MD Emergency Medicine    [facility]       Primary Care Provider Office Phone # Fax #    German Daniel Irwin Wegener, [phone]      After Care Instructions     Activity       Your activity upon discharge: activity as tolerated.            Diet       Follow this diet upon discharge:       Regular Diet Adult                  Follow-up Appointments     Follow-up and recommended labs and tests        Follow up with Psychiatrist as an outpatient.                  Further instructions from your care team       Clothing, slippers, cell phone/, watch and wallet, kept with pt, declined security. No home medications.     Pending Results     No orders found for last 3 day(s).            Statement of Approval     Ordered          05/20/18 1031  I have reviewed and agree with all the recommendations and orders detailed in  "this document.  EFFECTIVE NOW     Approved and electronically signed by:  Gerardo Ny MD             Admission Information     Date & Time Provider Department Dept. Phone    2018 Asif Elena MD [facility] [address] [facility] [phone]      Your Vitals Were     Blood Pressure Pulse Temperature Respirations Height Weight    132/81 (BP Location: Left arm) 130 98.6  F (37  C) (Oral) 18 1.803 m (5' 11\") 96 kg (211 lb 9.6 oz)    Pulse Oximetry BMI (Body Mass Index)                95% 29.51 kg/m2          MyChart Information     [employer] lets you send messages to your doctor, view your test results, renew your prescriptions, schedule appointments and more. To sign up, go to [email or web address] . Click on \"Log in\" on the left side of the screen, which will take you to the Welcome page. Then click on \"Sign up Now\" on the right side of the page.     You will be asked to enter the access code listed below, as well as some personal information. Please follow the directions to create your username and password.     Your access code is: [email or web address]  Expires: 2018  3:37 PM     Your access code will  in 90 days. If you need help or a new code, please call your [facility] clinic or [phone].        Care EveryWhere ID     This is your Care EveryWhere ID. This could be used by other organizations to access your [facility] medical records  [id number]        Equal Access to Services     [facility] AH: Hadii madelyn hernandez hadasho Soomaali, waaxda luqadaha, qaybta kaalmada adeegyada, sierra dumont. So [facility] [phone].    ATENCIÓN: Si habla español, tiene a corea disposición servicios gratuitos de asistencia lingüística. Llame al [phone].    We comply with applicable federal civil rights laws and [place] laws. We do not discriminate on the basis of race, color, national origin, age, disability, sex, sexual orientation, or gender identity.             "   Review of your medicines      CONTINUE these medicines which have NOT CHANGED        Dose / Directions    ATIVAN PO   Notes to Patient:  Take only 1 tab per day as needed for anxiety.        Dose:  0.25 mg   Take 0.25 mg by mouth daily as needed   Refills:  0       PAXIL PO   Notes to Patient:  Resume as per home routine        Dose:  20 mg   Take 20 mg by mouth daily   Refills:  0                Protect others around you: Learn how to safely use, store and throw away your medicines at www.disposemymeds.org.             Medication List: This is a list of all your medications and when to take them. Check marks below indicate your daily home schedule. Keep this list as a reference.      Medications           Morning Afternoon Evening Bedtime As Needed    ATIVAN PO   Take 0.25 mg by mouth daily as needed   Last time this was given:  0.25 mg on 5/19/2018  5:54 PM   Next Dose Due:  available anytime today   Notes to Patient:  Take only 1 tab per day as needed for anxiety.                                   PAXIL PO   Take 20 mg by mouth daily   Last time this was given:  20 mg on 5/20/2018  8:29 AM   Next Dose Due:  5/21/18   Notes to Patient:  Resume as per home routine                                          More Information        Drug Abuse  Use and abuse of drugs like marijuana, amphetamines (speed, crank), cocaine, heroin or prescription pain medicines, sedatives and sleeping pills, MDMA, ecstasy, bath salts, PCP, mescaline and LSD may lead to addiction or dependence. Once this happens, you are at greater risk for any of the following:  Social and personal problems    Craving for the drug and not able to stop using even though you think you want to stop (psychological addiction)    Drug withdrawal symptoms if you stop taking the drug (physical dependence)    Loss of job or your family    Arrest, conviction, and intermediate sentence for possession of an illegal substance or for driving under the influence  Health  problems    Strokes, heart attacks, kidney failure    Accidental injuries to yourself or others while you are under the influence of the drug (in a car or at home)    HIV infection (much greater risk if you use IV drugs)    Skin infections    Other sexually transmitted disease (STDs such as herpes, chlamydia, gonorrhea, and others)    Severe and fatal infection of the heart valves (if you use IV drugs)    Hepatitis B or C    Death from overdose  Home care  The following suggestions can help you care for yourself at home:    Admit you have a drug problem. Ask for help from your family and close friends.    Seek professional help. This could be in the form of individual psychotherapy or counseling. There are also outpatient, inpatient, and residential drug treatment programs.    Join a self-help group for drug abuse.    Stay away from friends who abuse drugs or tempt you to continue abusing drugs.    Eat a balanced diet and start a regular exercise program.  Follow-up care  Follow up with your healthcare provider, or as advised. Contact one of the resources below for help:    National [place] on Alcoholism and Drug Dependence  www.ncadd.org  924.734.6232    Narcotics Anonymous  www.na.org  975.947.6008    National Alcohol and Substance Abuse Information Center (for referral to treatment programs)  www.addictioncareUrGift.[employer]  756.217.8545  Call 911  Call 911 if any of the following occur:    Seizure    Hard time breathing or slow, irregular breathing    Chest pain    Sudden weakness on one side of your body or sudden trouble speaking    Very drowsy or trouble awakening    Fainting or loss of consciousness    Rapid heart rate    Very slow heart rate  When to seek medical advice  Call your healthcare provider right away if any of these occur:    Agitation, anxiety, unable to sleep    Unintended weight loss (more than 10 to 15 pounds over 3 months)    Fever of 100.4 F (38 C) or higher, or as directed by your healthcare  provider    Shortness of breath    Cough with colored sputum    Redness, swelling, or tenderness at an injection site  Date Last Reviewed: 6/1/2016 2000-2017 [employer]. [address] [place] [address], [place] [address]. All rights reserved. This information is not intended as a substitute for professional medical care. Always follow your healthcare professional's instructions.                Treating Drug Abuse and Addiction  Treatment for addiction to drugs varies with your needs. Some people go through treatment only once. Others return to it off and on throughout their lives.    Recovery is a lifelong process  Recovery begins when you seek help for your drug abuse or addiction. Then, you?ll slowly start to build a new life and lifestyle. It may not always be easy. But with the support of others, you can succeed. During recovery, you?ll go through 3 stages. How long each one lasts varies with each person.  Early recovery  During this stage, you?ll focus on stopping your drug abuse or addiction. Most likely, you?ll get help from a therapist, addiction counselor, or doctor. You may also go to self-help groups on a regular basis. You?ll avoid people or places that might tempt you to use drugs.  Middle recovery  During this time, you?ll work on changing your life. You may change your values, move, or go back to school. You might start new, healthy relationships. And you might end ones that aren?t as healthy. You may even try to make up for harm you caused others while using drugs. You will continue the lifestyle changes and strategies that support your sobriety and access doctors or addiction counselors when you are concerned about a slip.  Late recovery  This stage will last for the rest of your life. You?re feeling stronger and healthier. Now, you may look for a greater sense of purpose. You may focus on the things that matter to you most. These may include your family, your beliefs, or lending a hand to  others. You will continue to use the lifestyle changes and strategies that support your sobriety and seek help from doctors or addiction counselors when you are concerned about a slip.  Types of drug treatment    Residential treatment. You live in a drug-free setting with others who have the same problem. Often, your stay in community residential treatment lasts about a month, but it could last up to 6 months. During this time, you see a therapist or addiction counselor.    Outpatient therapy. You see a therapist, or addiction counselor while living your normal life. You may see your therapist by yourself. Or you may be part of a group. In some cases, your family may see your therapist too.    Self-help groups. These offer you support and encouragement. There are also support groups for the loved ones of people addicted to drugs.    Medicine. Your treatment may include certain medicines, such as methadone, disulfiram, buprenorphine, or naltrexone.    Alternative treatments. These may include acupuncture, hypnosis, or biofeedback. Ask your healthcare provider about them.  When times get tough  Drug addiction is never really cured. Sometimes, no matter how well you?re doing, you may be tempted. If so, you can:    Call your sponsor. This is someone in your self-help group who watches out for you.    Talk to your therapist, healthcare provider, or someone else you trust.    Make a list of how much you?ve achieved.    Find something to distract you. Go to a movie, go out for a walk, or call a friend.  Date Last Reviewed: 2/1/2017 2000-2017 [employer].. [address] [place] [address], [place] [address]. All rights reserved. This information is not intended as a substitute for professional medical care. Always follow your healthcare professional's instructions.              Mental Health Crisis Numbers  [facility]  If you have a mental health or substance abuse crisis on a weekend or after hours,  "please use any of the resources below.  General numbers  911 emergency services  211 First Call for Help  [facility]  [address] [place] [id number] [phone]  Crisis Connection Hotline  464.380.4456 or 1-951.732.4716  National Suicide Prevention Lifeline  8-488-249-TALK (5827)  LTW3KZWV  Text crisis line for teens. Text \"LIFE\" to 794713  [facility] mobile crisis services  These services will come to you. Call the county where the child is physically located.    [place] (adult only): [phone]    Henry/Carlos (child and adult): [phone]    Sam (child and adult): [phone]    Kranthi (child): [phone]    Kranthi (adult): [phone]    Patrick (child): [phone]    [place] (Adult): [phone]    [place] (child and adult): [phone]    Jovi/Aracely/Rajni/Jevon (child and adult): 627.737.1911 or 1-940.549.6172  Other crisis resources (not mobile)  [facility]  2-111-542-8387  [place] Youth Crisis Hotline  973.404.6680 or 1-375.621.1742  Acute Psychiatric Services (formerly known as the Crisis Intervention Center)  Offers walk-in and telephone crisis intervention services for adults.  [facility]  [address] [place], [place] [id number] [phone] or 483-055-6115 (suicide hotline)  Short-term residential crisis resources  [facility] (ages 10 to 17)  [address] [place] [address] [phone]  Suggested inpatient hospitalization   [address] [address]  [phone]  For informational purposes only. Not to replace the advice of your health care provider.  Copyright   2014 [facility]. All rights reserved. [employer] 323016 - REV 09/15.            Your Body?s Response to Anxiety    Normal anxiety is part of the body?s natural " defense system. It's an alert to a threat that is unknown, vague, or comes from your own internal fears. While you?re in this state, your feelings can range from a vague sense of worry to physical sensations such as a pounding heartbeat. These feelings make you want to react to the threat. An anxiety response is normal in many situations. But when you have an anxiety disorder, the same response can occur at the wrong times.  Anxiety can be helpful  Normal anxiety is a signal from your brain that warns you of a threat and is a normal response to help you prevent something or decrease the bad effects of something you can't control. For example, anxiety is a normal response to situations that might damage your body, separate you from a loved one, or lose your job. The symptoms of anxiety can be physical and mental.  How does it feel?  At certain times, people with anxiety may have:    Dizziness    Muscle tension or pain    Restlessness    Sleeplessness    Trouble concentrating    Racing heartbeat    Fast breathing    Shaking or trembling    Stomachache    Diarrhea    Loss of energy    Sweating    Cold, clammy hands    Chest pain    Dry mouth  Anxiety can also be a problem  Anxiety can become a problem when it is hard to control, occurs for months, and interferes with important parts of your life. With an anxiety disorder, your body has the response described above, but in inappropriate ways. The response a person has depends on the anxiety disorder he or she has. With some disorders, the anxiety is way out of proportion to the threat that triggers it. With others, anxiety may occur even when there isn?t a clear threat or trigger.  Who does it affect?  Some people are more prone to persistent anxiety than others. It tends to run in families, and it affects more younger people than older people, and more women than men. But no age, race, or gender is immune to anxiety problems.  Anxiety can be treated  The good news is  "that the anxiety that?s disrupting your life can be treated. Check with your healthcare provider and rule out any physical problems that may be causing the anxiety symptoms. If an anxiety disorder is diagnosed seek mental healthcare. This is an illness and it can respond to treatment. Most types of anxiety disorders will respond to \"talk therapy\" and medicines. Working with your doctor or other healthcare provider, you can develop skills to help you cope with anxiety. You can also gain the perspective you need to overcome your fears. Note: Good sources of support or guidance can be found at your local hospital, mental health clinic, or an employee assistance program.  How to cope with anxiety  If anxiety is wearing you down, here are some things you can do to cope:    Keep in mind that you can?t control everything about a situation. Change what you can and let the rest take its course.    Exercise--it?s a great way to relieve tension and help your body feel relaxed.    Avoid caffeine and nicotine, which can make anxiety symptoms worse.    Fight the temptation to turn to alcohol or unprescribed drugs for relief. They only make things worse in the long run.    Educate yourself about anxiety disorders. Keep track of helpful online resources and books you can use during stressful periods.    Try stress management techniques such as meditation.    Consider online or in-person support groups.   Date Last Reviewed: 1/1/2017 2000-2017 [employer]. [address] [place] [address], [place] [address]. All rights reserved. This information is not intended as a substitute for professional medical care. Always follow your healthcare professional's instructions.                Drug Abuse: Prescribed Narcotics and Sedatives  Prolonged or overuse of drugs prescribed for pain or sedation may lead to addiction or dependence. Just because these medicines are legal doesn't mean they can't cause problems. This is true whether " they are overused or not. It is also dangerous when these medicines are used differently than your healthcare provider has instructed. Also, if you don't take the medicine, someone else might try to use them.   Physical dependence leads to drug withdrawal symptoms when you stop taking the drug. With psychological addiction, you feel a strong craving for the drug. You may be unable to stop using the drug even though you want to stop. These problems can happen even when the medicine is taken at the prescribed dose.   Drug addiction places you, your family, and your job at risk. You could be arrested, convicted, and sentenced to halfway. You have a higher chance of accidental injuries to yourself or others while under the influence of the drug. Death from an unintentional overdose of the drug is one of the greatest risks.  Health problems  There are so many possible health problems related to drug abuse. Different medicines have different risks. Medicines can cause problems even if you have no history of medical problems. Some problems are affected by the other medicines you may be taking and chronic illness you may have. Some are directly related to the drugs and others from addiction or dependency. Here are some risks:    Drowsiness    Anxiety    Seizures    Blood pressure problems    Depression    Insomnia    Nausea, vomiting and stomach problems    Slurred speech    Trouble breathing    Dizziness    Skin infections    Muscle pain and spasms    Strokes, heart attacks, and kidney failure    HIV infection (much greater risk if you use IV drugs)    Other sexually transmitted diseases (STDs) such as herpes, chlamydia, gonorrhea, and others    Severe and fatal infection of the heart valves (if you use IV drugs)    Coma and death   Get care  The following steps can help you:    Admit you have a drug problem to yourself and your family and close friends.    Tell your doctors about this problem so they can help you by  adjusting the type and amount of medicines that they give you in the future. It is best to get all prescriptions for addictive medicines from a single doctor who can check what is being prescribed.    Ask your doctor for a referral for professional help. This could be individual psychotherapy, counseling, or a drug treatment program (outpatient or residential).    Join a self-help group for drug abuse.    Stay away from friends who abuse drugs or tempt you to continue abusing drugs.    Don't combine pain medicines and sedatives together or with alcohol. This can cause over-sedation, coma, and stop your breathing.  Follow-up care  Follow up with your healthcare provider, or as advised. Contact one of these resources for help:    National [place] on Alcoholism and Drug Dependence, www.ncadd.org    Narcotics Anonymous, www.na.org    National Alcohol and Substance Abuse Information Center (for referral to treatment programs) at 409-396-1541,  www.addictioncareNomadica Brainstormingions.[employer]  Call 911  Call 911 if any of the following occur:    Seizure    Hard time breathing or slow, irregular breathing    Chest pain    Sudden weakness on one side of your body or sudden trouble speaking    Very drowsy or trouble awakening    Fainting or loss of consciousness    Rapid heart rate    Very slow heart rate  When to seek medical advice  Call your healthcare provider right away if any of these occur:     Agitation, anxiety, unable to sleep    Unintended weight loss    Fever of 100.4 F (38 C) or higher, or as directed by your healthcare provider    Cough with colored sputum  Date Last Reviewed: 6/1/2016 2000-2017 [employer]. [address] [place] [address]. All rights reserved. This information is not intended as a substitute for professional medical care. Always follow your healthcare professional's instructions.

## 2018-05-19 NOTE — H&P
"[facility]    History and Physical  Hospitalist       Date of Admission:  5/19/2018    Assessment & Plan   Carlos A Connor is a 37 year old male with past history cocaine and methamphetamine abuse, anxiety who presents with palpitations and dyspnea secondary to cocaine and methamphetamine intoxication.    Symptomatic tachycardia secondary to cocaine and methamphetamine intoxication  History of polysubstance abuse  Presents with racing palpitations, dyspnea after snorting an \"unknown\" white powder while partying with friends last evening with Utox positive for cocaine and methamphetamine.  History of polysubstance abuse with reported alcohol, cocaine and methamphetamine per chart review, but patient reports he hasn't abused these drugs in several years.  No improvement in tachycardia with 2L NS or 2 mg lorazepam.  - telemetry, no metoprolol secondary to cocaine; could consider diltiazem and additional lorazepam if pulse increasing  -  ml/hr  - psych consult    Anxiety  - continue PTA Paxil and lorazepam    DVT Prophylaxis: Low Risk/Ambulatory with no VTE prophylaxis indicated  Code Status: Full Code    # Pain Assessment:  Current Pain Score 5/19/2018   Pain score (0-10) 8   Carlos A medina pain level was assessed and he currently denies pain.      Disposition: Expected discharge in 1 days once symptoms improved and heart rate down.    Asif Elena    Primary Care Physician   Joel Daniel Wegener    Chief Complaint   Racing palpitations and dyspnea    History is obtained from the patient and chart review    History of Present Illness   Carlos A Connor is a 37 year old male who presents with racing palpitations and dyspnea.  Patient reports he was out with friends drinking last night.  He snorted a powder, the contents of which he did not know.  He reports being unable to sleep overnight with racing palpitations, dyspnea, dry mouth, diaphoresis and lightheadedness.  He reports poor oral intake with " only two glasses of water over this time.  He received 2 L normal saline and 2 mg Ativan in the emergency room and reports his shortness of breath has improved but he continues to have racing palpitations.  He denies any chest pain or pressure and his remainder review of systems is otherwise negative.  He reports a distant history of drug abuse, reporting he last used cocaine upwards of 10 years ago.  When asked about methamphetamine abuse that is documented in his chart, he stated that he frequently used drugs that his friends gave him and he didn't always know what they were.  He reports binge drinking approximately twice per month.  He follows with a psychiatrist as outpatient for anxiety, whose name he cannot fully recall...Dr. Linda Saba...???    Past Medical History    Anxiety  History of cocaine abuse  History of methamphetamine abuse  History of alcohol abuse    Past Surgical History   Denies any prior surgical history.    Prior to Admission Medications   Prior to Admission Medications   Prescriptions Last Dose Informant Patient Reported? Taking?   LORazepam (ATIVAN PO) 5/18/2018 at Unknown time Self Yes Yes   Sig: Take 0.25 mg by mouth daily as needed    PARoxetine HCl (PAXIL PO) 5/18/2018 at Unknown time  Yes Yes   Sig: Take 20 mg by mouth daily      Facility-Administered Medications: None     Allergies   Allergies   Allergen Reactions     Droperidol        Social History   I have personally reviewed the social history with the patient showing drug and alcohol use as noted in history of present illness.  He denies any tobacco abuse.    Family History   Denies any significant family history.    Review of Systems   The 10 point Review of Systems is negative other than noted in the HPI or here.     Physical Exam   Temp: 97.4  F (36.3  C) Temp src: Oral BP: (!) 154/102 Pulse: 130 Heart Rate: 126 Resp: 23 SpO2: 96 %      Vital Signs with Ranges  Temp:  [97.4  F (36.3  C)] 97.4  F (36.3  C)  Pulse:  [126-130]  130  Heart Rate:  [112-131] 126  Resp:  [10-35] 23  BP: (154-186)/() 154/102  SpO2:  [95 %-98 %] 96 %  210 lbs 0 oz    Constitutional: well developed, well nourished male, appearing diaphoretic, in no acute distress  Eyes: pupils equal but dilated, round and reactive to light, no icterus  HEENT: head normocephalic, atraumatic, mucous membranes somewhat dry, no cervical lymphadenopathy  Respiratory: lungs clear to auscultation bilaterally, no crackles or wheezes, no tachypnea  Cardiovascular: tachycardic, regular rhythm, normal S1/S2, no murmur, rubs or gallops  GI: abdomen soft, non-tender, non-distended, normal bowel sounds, no hepatosplenomegaly  Lymph/Hematologic: No peripheral edema  Skin: No rash or bruising  Musculoskeletal: Extremities warm and well perfused  Neurologic: alert and appropriate, cranial nerves grossly intact, gross motor movements intact, strength grossly intact, very fine tremor of hands, no tongue fasciculations  Psychiatric: normal affect    Data   Data reviewed today:  I personally reviewed the EKG tracing showing sinus tachycardia with very slight ST depression in V3-V6.    Recent Labs  Lab 05/19/18  1136   WBC 9.6   HGB 15.4   MCV 86         POTASSIUM 3.7   CHLORIDE 102   CO2 22   BUN 8   CR 0.60*   ANIONGAP 13   HORTENSIA 9.0   *       No results found for this or any previous visit (from the past 24 hour(s)).

## 2018-05-19 NOTE — ED PROVIDER NOTES
"  History     Chief Complaint:  Shortness of Breath     HPI   Carlos A Connor is a 37 year old male with a history of anxiety and panic attack who presents to the emergency department today for evaluation of shortness of breath. The patient reports he does not usually drink but last night, he went out with some friends and had 10-12 beers and snorted a \"white powder\" around 0100. The patient is not certain if the powder was cocaine. This morning, he felt chest pain and shortness of breath, prompting him to present to the emergency department. He has not taken his Lorazepam this morning and denies any other injuries or illnesses.     Allergies:  Droperidol    Medications:    Tylenol     Past Medical History:    Anxiety  Chest pain  Migraines  Panic attack    Past Surgical History:    The patient does not have any pertinent past surgical history.    Family History:    No past pertinent family history.    Social History:  The patient was alone in the emergency department.   Smoking Status: Former smoker  Smokeless Tobacco: Never  Alcohol Use: Yes  Marital Status:  Single      Review of Systems   Respiratory: Positive for shortness of breath.    Cardiovascular: Positive for chest pain.   All other systems reviewed and are negative.    Physical Exam   First Vitals: BP (!) 186/110  Pulse 126  Temp 97.4  F (36.3  C) (Oral)  Resp 16  Ht 1.803 m (5' 11\")  Wt 95.3 kg (210 lb)  SpO2 96%  BMI 29.29 kg/m2    Physical Exam  General: Resting on the gurney, anxious affect  Head:  The scalp, face, and head appear normal  Mouth/Throat: Mucus membranes are moist  CV:  Rapid but regular rate    Normal S1 and S2  No pathological murmur   Resp:  Breath sounds clear and equal bilaterally    Non-labored, no retractions or accessory muscle use    No coarseness    No wheezing   GI:  Abdomen is soft, no rigidity    No tenderness to palpation  MS:  Normal motor assessment of all extremities.    Good capillary refill " noted.  Skin:  Slightly diaphoretic. No lower extremity edema, swelling or excess warmth. No rash or   lesions noted.  Neuro: Cranial nerves 2-12 intact. Strength intact x4. Sensation intact. Speech is normal and   fluent. No apparent deficit.  Psych:  Awake. Alert.  Appropriate interactions.    Emergency Department Course     ECG:  Indication: Shortness of breath   Completed at 1122.   Sinus tachycardia  Nonspecific ST and T wave abnormality  Abnormal ECG  Rate 123 bpm. OH interval 132. QRS duration 84. QT/QTc 332/475. P-R-T axes 51 38 24.    Laboratory:  Laboratory findings were communicated with the patient who voiced understanding of the findings.    CBC: WBC 9.6, HGB 15.4,   BMP: Creatinine 0.60 (L), glucose 135 (H)  TSH: 1.00  Drug Abuse: positive for cocaine and amphetamine  Alcohol breath: 0.00  Hepatic Panel: ALT 77(H)    Interventions:  1138 NS Bolus 1,000mL IV  1140 Ativan 1 mg IV     Emergency Department Course:  Nursing notes and vitals reviewed.  I performed an exam of the patient as documented above.   IV was inserted and blood was drawn for laboratory testing, results above.  updated on the imaging and laboratory results.     1707 Patient rechecked and updated.     I personally reviewed the laboratory results with the Patient and answered all related questions prior to   discharge.  Findings and plan explained to the Patient who consents to admission. Discussed the patient with Dr. Elena, who will admit the patient to an observation bed for further monitoring, evaluation, and treatment.     Impression & Plan      Medical Decision Making:  Carlos A Connor is a 37 year old male who presents to the emergency department for evaluation of tachycardia.  The patient drank heavily last night and also snorted an unknown white substance.  He reports not feeling well since that time, having ongoing anxiety, palpitations and feeling quite anxious.  On evaluation in the emergency department, the  patient has had persistent tachycardia despite given being given IV fluids as well as Ativan. Despite attempting to correct his heart rate in the ED for 6 hours, he  continued to be tachycardic.  As such, he was discussed with the hospitalist was placed in observation for further management and care.  Disposition observation for presumed substance-induced tachycardia.    Diagnosis:      1. Tachycardia   2. Substance abuse     Disposition:  Discharged to home     Scribe Disclosure:  Jorge NJ, am serving as a scribe at 11:36 AM on 5/19/2018 to document services personally performed by Linda Hernandez MD based on my observations and the provider's statements to me.     5/19/2018    EMERGENCY DEPARTMENT       Linda Hernandez MD  05/21/18 0882

## 2018-05-20 VITALS
SYSTOLIC BLOOD PRESSURE: 132 MMHG | OXYGEN SATURATION: 95 % | BODY MASS INDEX: 29.62 KG/M2 | DIASTOLIC BLOOD PRESSURE: 81 MMHG | TEMPERATURE: 98.6 F | RESPIRATION RATE: 18 BRPM | WEIGHT: 211.6 LBS | HEIGHT: 71 IN | HEART RATE: 130 BPM

## 2018-05-20 PROBLEM — F19.10 SUBSTANCE ABUSE (H): Status: ACTIVE | Noted: 2018-05-20

## 2018-05-20 PROBLEM — F15.10 METHAMPHETAMINE ABUSE (H): Status: ACTIVE | Noted: 2018-05-20

## 2018-05-20 PROCEDURE — 25000132 ZZH RX MED GY IP 250 OP 250 PS 637: Performed by: HOSPITALIST

## 2018-05-20 PROCEDURE — 25000128 H RX IP 250 OP 636: Performed by: HOSPITALIST

## 2018-05-20 PROCEDURE — G0378 HOSPITAL OBSERVATION PER HR: HCPCS

## 2018-05-20 PROCEDURE — 99221 1ST HOSP IP/OBS SF/LOW 40: CPT | Performed by: PSYCHIATRY & NEUROLOGY

## 2018-05-20 PROCEDURE — 99217 ZZC OBSERVATION CARE DISCHARGE: CPT | Performed by: INTERNAL MEDICINE

## 2018-05-20 PROCEDURE — 99207 ZZC CDG-CODE CATEGORY CHANGED: CPT | Performed by: INTERNAL MEDICINE

## 2018-05-20 RX ADMIN — PAROXETINE HYDROCHLORIDE 20 MG: 20 TABLET, FILM COATED ORAL at 08:29

## 2018-05-20 RX ADMIN — ACETAMINOPHEN 650 MG: 325 TABLET ORAL at 08:29

## 2018-05-20 RX ADMIN — SODIUM CHLORIDE: 9 INJECTION, SOLUTION INTRAVENOUS at 04:17

## 2018-05-20 NOTE — CONSULTS
"Consult Date:  05/20/2018      DATE OF SERVICE:  05/20/2018       REFERRING PHYSICIAN:  Asif Elena MD      REASON FOR CONSULTATION:  Methamphetamine and cocaine abuse and anxiety.      HISTORY OF PRESENT ILLNESS:  Mr. Carlos A Connor is a 37-year-old Haitian American, a  at [employer], living in [place], [place].  He has a history of unspecified anxiety as well as history of cocaine and methamphetamine abuse.  He follows with TERRA Dsouza at [facility].  He has one previous admission to [facility] in 2012 as a result of substance use.  He presented to [facility] and was admitted on 05/09/2018 in the setting of intoxication from cocaine and methamphetamine.  He was describing palpitations and dyspnea after snorting an \"unknown\" white powder while partying with his friends.  Psychiatry was asked to assess the patient in this setting.      On interview, Carlos A reports along with the  that he presented to the hospital after a night of partying with his friends.  He acknowledges that since he has not been drinking very frequently, that he was more sensitive to the alcohol and got a bit carried away.  He describes that although he has been sober from drugs for quite some time, that he was more impulsive while intoxicated and snorted a white powder substance.  He recalls getting palpitations and shortness of breath and presented to the hospital for help.  He is now feeling much better today, only reporting a mild headache on review of systems.  Otherwise,    he is feeling back to normal.        He reports that mental health-wise he has been doing very well.  He follows with Elizabeth Patrick, at [facility] and reports that he is stable on his current dose of Paxil and that he has been able to tolerate a gradual reduction in the lorazepam dosing over time.  He describes currently just taking a half tablet per day.  He has his next scheduled " appointment with Elizabeth on Friday 05/25 and plans to attend.      He denies any current or recent symptoms of anxiety including panic attacks, fear, excessive tension or worry.  Denies depressed mood or anhedonia.  Denies any problems with appetite, energy level, sleep, hopelessness or suicidal ideation.  He denies any history of symptomatology consistent with vikram or bipolar disorder.  Denies any auditory or visual hallucinations or any paranoid ideation.  He denies any thoughts of harming others or history of violent behavior other than a few fights when he was younger, but does not consider himself a violent person.      PAST PSYCHIATRIC HISTORY:  One prior admission at [facility] as a result of intoxication.  He describes he has been following with Elizabeth Patrick for many years and has been quite stable.  He uses Paxil and a low dose of Ativan daily, finding these sufficiently beneficial in managing his mood and anxiety.  He has been able to tolerate a gradual reduction of the lorazepam dosing over time per his report.      MEDICAL HISTORY:  No significant medical history or surgical history.      MEDICATIONS:  Prior to admission, lorazepam 0.25 mg per day as needed and then paroxetine 20 mg daily.      ALLERGIES TO DROPERIDOL.      SOCIAL HISTORY:  He resides in [place] with his mother and a couple of his brothers.  He moved to the [place] around the late 1990s.  He was born and raised in [place].  He works as a  at [employer] in [place].  He does not use tobacco.  He reports he very rarely uses alcohol, perhaps once every several months, 1-2 drinks at a time typically.  However, on this night he was out with friends and partied more than he typically does.  He reports that it has been a very long time since he used any drugs.      FAMILY HISTORY:  Denies family history of psychiatric disorders including anxiety disorders, depressive disorders or addiction.      REVIEW OF  SYSTEMS:  Ten-point review of systems completed and negative other than described in the HPI.      PHYSICAL EXAMINATION:   VITAL SIGNS:  Temp 98.6, pulse 74, blood pressure 132/81, SpO2 of 95% on room air.      MENTAL STATUS EXAMINATION:  He is lying down in bed and on an incline.  He is dressed in hospital gown and appears well groomed.  Maintains good eye contact.  He is very pleasant and cooperative.  No involuntary movements appreciated.  Kinetics are calm.  No agitation.  Use of language is articulate and spoken in [place] with the help of a  present at the bedside.  He sounds fluent.  Speech appears nonpressured and of normal volume and prosody.  Mood is described as good.  Affect is mood congruent, calm, euthymic without blunting or irritability.  His thought form is linear, logical, goal directed.  No flight of ideas.  He is not responding to internal stimuli.  Denies perceptual disturbances.  There is no fluctuation in cognition or deficits in cognitive processing noted.  Attention, concentration appears intact as he is able to maintain conversation easily.  He demonstrates reasonable insight.  He acknowledges the serious life-threatening risks of excessive drug and alcohol use and the need to attempt to avoid use of drugs in the future.  Also agrees with the idea of minimizing or eliminating alcohol use.  He demonstrates good judgment.  He is collaborative and cooperative with the treatment team.      IMPRESSION, REPORT AND PLAN:   1.  Methamphetamine and cocaine intoxication, resolving.   2.  Unspecified anxiety disorder by history.      RISK ASSESSMENT AND FORMULATION:  This is a very pleasant 37-year-old gentleman who presented with methamphetamine and cocaine intoxication, which he describes occurred quite impulsively in the setting of a night of partying with his friends, which has been atypical for him per his report.  He does have a history of one previous admission for  intoxication in 2012.  However, he describes that he has been very stable in recent years with very minimal alcohol use and no drug use and things are going very well in general.  He denies any mental health issues including any exacerbations of his anxiety disorder or any depression episodes.  He reports that he is stable on his current medications of Paxil and has been able to tolerate a gradual reduction of the dose of lorazepam.  He follows closely with Elizabeth Jane and reports that this is very helpful.      PLAN:   1.  The patient does not meet criteria for involuntary treatment.  Treatment services and recommendations were offered, but the patient politely declines.  Given he is not demonstrating immediate danger to self or others based on his use pattern history and given he relates awareness of life-threatening risks of excessive drug and alcohol use, he does not meet criteria for involuntary treatment.   2.  He agrees to continue close followup with his outpatient psychiatric provider and has an appointment scheduled on 05/25 with Elizabeth Patrick, [facility].  He agrees to sign a release of information so that this documentation and the documentation of the admission can be sent to Elizabeth Patrick for her review prior to their next appointment.   3.  Continue Paxil 20 mg daily and his low-dose Ativan 0.25 mg daily p.r.n.   4.  He does not need any refills at discharge stating that he has his home prescriptions available.    5.  Risk level of harm to self, is low given no history of suicidal behavior, denies suicidal ideation, he is future oriented, collaborative and willing to continue outpatient mental followup.  Risk of harm to others is low as he is without thoughts of harming others, does not demonstrate any aggression or agitated behavior, and denies any history of assaultive behavior other than fights when he was younger.  He denies access to firearms.         MAIDA HAMM MD             D:  2018   T: 2018   MT: NTS      Name:     WM ZELAYA   MRN:      [id number]        Account:       [id number]   :      1980           Consult Date:  2018      Document: [id number]       cc: Erica Zielin Wurm NP Joel Daniel Irwin Wegener MD

## 2018-05-20 NOTE — DISCHARGE SUMMARY
[facility]    Discharge Summary  Hospitalist  Gerardo Ny MD    Date of Admission:  5/19/2018  Date of Discharge:  5/20/2018  Discharging Provider: Gerardo Ny    Discharge Diagnoses   1. Symptomatic tachycardia, secondary to cocaine and methamphetamine intoxication.    History of Present Illness   Carlos A Connor is an 37 year old male, who presented with palpitations, dyspnea, dry mouth, diaphoresis and lightheadedness after snorting cocaine.    Hospital Course   37 year old male, with past medical history of alcohol, cocaine and methamphetamine abuse, anxiety, who was admitted on 5/19/18, due to symptoms of palpitations, dyspnea, dry mouth, diaphoresis and lightheadedness secondary to snorting cocaine and methamphetamine intoxication. Work up done on 5/19/18 revealed normal CMP, TSH and CBC. Urine drug screen revealed, positive amphetamine and cocaine. EKG done on 5/19/18 revealed, sinus tachycardia rate 123/min, with non-specific ST and T wave changes.       The patient was admitted to the medical telemetry floor and was commenced on IV N/Saline. His symptoms improved the following day. He was reviewed by the Psychiatrist - Dr. Stephenson and the patient was medically cleared for discharge. He is to follow up with his mental health provider as an outpatient. There was no medication changes made during this admission.       Significant Results and Procedures   See above.    Pending Results   None.  Unresulted Labs Ordered in the Past 30 Days of this Admission     No orders found for last 61 day(s).          Code Status   Full Code       Primary Care Physician   Joel Daniel Wegener    Physical Exam   Temp: 98.6  F (37  C) Temp src: Oral BP: 132/81 Pulse: 130 Heart Rate: 74 Resp: 18 SpO2: 95 % O2 Device: None (Room air)    Vitals:    05/19/18 1124 05/19/18 1721   Weight: 95.3 kg (210 lb) 96 kg (211 lb 9.6 oz)       Constitutional: Adult male, awake, anxious,  cooperative, no apparent distress, O2 Sats 95% on RA  Respiratory: BS vesicular bilaterally, no crackles or wheezing  Cardiovascular: S1 and S2, reg, no murmur noted  GI: Soft, non-distended, non-tender, no masses, BS present+  Skin/Integumen: No rashes  Extremities: No pedal edema    Discharge Disposition   Discharged to home  Condition at discharge: Stable    Consultations This Hospital Stay   PSYCHIATRY IP CONSULT    Time Spent on this Encounter   I, Gerardo Ny, personally saw the patient today and spent less than or equal to 30 minutes discharging this patient.    Discharge Orders     Reason for your hospital stay   Palpitations and lightheadedness, due to cocaine use.     Follow-up and recommended labs and tests    Follow up with Psychiatrist as an outpatient.     Activity   Your activity upon discharge: activity as tolerated.     Full Code     Diet   Follow this diet upon discharge:      Regular Diet Adult       Discharge Medications   Current Discharge Medication List      CONTINUE these medications which have NOT CHANGED    Details   LORazepam (ATIVAN PO) Take 0.25 mg by mouth daily as needed       PARoxetine HCl (PAXIL PO) Take 20 mg by mouth daily           Allergies   Allergies   Allergen Reactions     Droperidol      Data   Most Recent 3 CBC's:  Recent Labs   Lab Test  05/19/18   1136  04/12/18   1440  03/24/15   1725   WBC  9.6  5.7  7.3   HGB  15.4  14.3  14.2   MCV  86  86  86   PLT  222  185  185      Most Recent 3 BMP's:  Recent Labs   Lab Test  05/19/18   1136  04/12/18   1440  03/24/15   1725   NA  137  139  140   POTASSIUM  3.7  3.3*  3.7   CHLORIDE  102  106  106   CO2  22  24  26   BUN  8  8  17   CR  0.60*  0.64*  0.84   ANIONGAP  13  9  8   HORTENSIA  9.0  8.5  8.2*   GLC  135*  103*  103*     Most Recent 2 LFT's:  Recent Labs   Lab Test  05/19/18   1136  04/12/18   1440   AST  20  36   ALT  77*  88*   ALKPHOS  99  86   BILITOTAL  1.0  1.1     Most Recent INR's and Anticoagulation  Dosing History:  Anticoagulation Dose History     There is no flowsheet data to display.        Most Recent 3 Troponin's:  Recent Labs   Lab Test  05/02/14   1845  05/02/14   1635  05/02/14   1330   10/09/12   2229   TROPI  <0.012  <0.012  <0.012   < >   --    TROPONIN   --    --    --    --   0.00    < > = values in this interval not displayed.     Most Recent Cholesterol Panel:No lab results found.  Most Recent 6 Bacteria Isolates From Any Culture (See EPIC Reports for Culture Details):  Recent Labs   Lab Test  03/07/14   1440  06/15/13   1905   CULT  No Beta Streptococcus isolated  No Beta Streptococcus isolated     Most Recent TSH, T4 and A1c Labs:  Recent Labs   Lab Test  05/19/18   1136   TSH  1.00       Results for orders placed or performed during the hospital encounter of 08/12/17   Head CT w/o contrast    Narrative    CT HEAD W/O CONTRAST  8/12/2017 11:51 PM      HISTORY: Sudden head pain post lifting today.    TECHNIQUE: Routine noncontrast head CT. Radiation dose for this scan  was reduced using automated exposure control, adjustment of the mA  and/or kV according to patient size, or iterative reconstruction  technique.    COMPARISON: 3/24/2015.    FINDINGS: Brain volume is normal for age. No mass, mass effect or  intracranial hemorrhage. No evidence of acute infarct. The visualized  paranasal sinuses are clear.      Impression    IMPRESSION: No acute abnormality.    JEFF PIZARRO MD

## 2018-06-24 ENCOUNTER — HOSPITAL ENCOUNTER (EMERGENCY)
Facility: CLINIC | Age: 38
Discharge: HOME | End: 2018-06-24
Attending: EMERGENCY MEDICINE | Admitting: EMERGENCY MEDICINE
Payer: COMMERCIAL

## 2018-06-24 VITALS
WEIGHT: 210 LBS | DIASTOLIC BLOOD PRESSURE: 103 MMHG | SYSTOLIC BLOOD PRESSURE: 172 MMHG | BODY MASS INDEX: 29.4 KG/M2 | OXYGEN SATURATION: 97 % | HEIGHT: 71 IN | RESPIRATION RATE: 20 BRPM | TEMPERATURE: 98.6 F

## 2018-06-24 DIAGNOSIS — R03.0 ELEVATED BLOOD PRESSURE READING WITHOUT DIAGNOSIS OF HYPERTENSION: ICD-10-CM

## 2018-06-24 DIAGNOSIS — F10.10 ALCOHOL ABUSE: ICD-10-CM

## 2018-06-24 DIAGNOSIS — R11.0 NAUSEA: ICD-10-CM

## 2018-06-24 DIAGNOSIS — F10.120 HANGOVER WITHOUT COMPLICATION (H): ICD-10-CM

## 2018-06-24 LAB — INTERPRETATION ECG - MUSE: NORMAL

## 2018-06-24 PROCEDURE — 99284 EMERGENCY DEPT VISIT MOD MDM: CPT | Mod: 25

## 2018-06-24 PROCEDURE — 96361 HYDRATE IV INFUSION ADD-ON: CPT

## 2018-06-24 PROCEDURE — 25000128 H RX IP 250 OP 636: Performed by: PHYSICIAN ASSISTANT

## 2018-06-24 PROCEDURE — 93005 ELECTROCARDIOGRAM TRACING: CPT

## 2018-06-24 PROCEDURE — 96374 THER/PROPH/DIAG INJ IV PUSH: CPT

## 2018-06-24 RX ORDER — ONDANSETRON 2 MG/ML
4 INJECTION INTRAMUSCULAR; INTRAVENOUS EVERY 30 MIN PRN
Status: DISCONTINUED | OUTPATIENT
Start: 2018-06-24 | End: 2018-06-24 | Stop reason: HOSPADM

## 2018-06-24 RX ADMIN — SODIUM CHLORIDE 1000 ML: 9 INJECTION, SOLUTION INTRAVENOUS at 10:25

## 2018-06-24 RX ADMIN — SODIUM CHLORIDE 1000 ML: 9 INJECTION, SOLUTION INTRAVENOUS at 10:59

## 2018-06-24 RX ADMIN — ONDANSETRON 4 MG: 2 INJECTION INTRAMUSCULAR; INTRAVENOUS at 10:26

## 2018-06-24 ASSESSMENT — ENCOUNTER SYMPTOMS
NAUSEA: 1
DIARRHEA: 0
VOMITING: 0
APPETITE CHANGE: 1
DIZZINESS: 1
WEAKNESS: 1
DIAPHORESIS: 1
SHORTNESS OF BREATH: 0

## 2018-06-24 NOTE — ED AVS SNAPSHOT
"  Emergency Department    [address] [facility] [address]    Phone:  [phone]    Fax:  [phone]                                       Carlos A Connor   MRN: [id number]    Department:   Emergency Department   Date of Visit:  6/24/2018           Patient Information     Date Of Birth          1980        Your diagnoses for this visit were:     Alcohol abuse     Nausea     Elevated blood pressure reading without diagnosis of hypertension        You were seen by Stephanie Lima MD.      Follow-up Information     Follow up with  Emergency Department.    Specialty:  EMERGENCY MEDICINE    Why:  As needed, If symptoms worsen    Contact information:    [address] [facility] [address] [phone]        Follow up with Wegener, Joel Daniel Irwin, MD In 3 days.    Specialty:  Family Practice    Why:  For BP recheck    Contact information:    [address] [facility] [address] [phone]          Discharge Instructions         Alcohol Abuse  Alcoholic drinks are harmful when you have too many of them. There is no set number of drinks that defines too much. Drinking that disrupts your life or your health is called alcohol abuse. Alcohol abuse can hurt your relationships with others. You may lose friends, a spouse, or even your job. You may be abusing alcohol if any of the following are true for you:    Duties at home or with  suffer because of drinking.    Duties at work or in school suffer because of drinking.    You have missed work or school because of drinking.    You use alcohol while driving or operating machinery.    You have legal problems such as arrests due to drinking.    You keep drinking even though it causes serious problems in your life.  Health effects  Alcohol abuse causes health problems. Sometimes this can happen after only drinking a  little.\" There is no set number of drinks or amount of alcohol that defines too much. The more " you drink at one time, and the more often you drink determine both the short-term and long-term health effects. It affects all parts of your body and your health, including your:    Brain. Alcohol is a central nervous system depressant. It can damage parts of the brain that affect your balance, memory, thinking, and emotions. It can cause memory loss, blackouts, depression, agitation, sleep cycle changes, and seizures. These changes may or may not be reversible.    Heart and vascular system. Alcohol affects multiple areas. It can damage heart muscle causing cardiomyopathy, which is a weakening and stretching of the heart muscle. This can lead to trouble breathing, an irregular heartbeat, atrial fibrillation, leg swelling, and heart failure. Alcohol use makes the blood vessels stiffen causing high blood pressure. All of these problems increase your risk of having heart attacks or strokes.    Liver. Alcohol causes fat to build up in the liver, affecting its normal function. This increases the risk for hepatitis, leading to abdominal pain, appetite loss, jaundice, bleeding problems, liver fibrosis, and cirrhosis. This, in turn, can affect your ability to fight off infections, and can cause diabetes. The liver changes prevent it from removing toxins in your blood that can cause encephalopathy, which may show with confusion, altered level of consciousness, personality changes, memory loss, seizures, coma, and death.    Pancreas. Alcohol can cause inflammation of the pancreas, or pancreatitis. This can cause abdominal pain, fever, and diabetes.    Immune system. Alcohol weakens your immune system in a number of ways. It suppresses your immune system making it harder to fight infections and colds. It also increases the chance of getting pneumonia and tuberculosis.    Cancer. Alcohol is a risk factor for developing cancer of the mouth, esophagus, pharynx, larynx, liver, and breast.    Sexual function. Alcohol can lead to  sexual problems.  Home care  The following guidelines will help you deal with alcohol abuse:    Admit you have a problem with alcohol.    Ask for help from your healthcare provider and trusted family members or close friends.    Get help from people trained in dealing with alcohol abuse. This may be individual counseling or group therapy, or it may be a supervised alcohol treatment program.    Join a self-help group for alcohol abuse such as Alcoholics Anonymous (AA).    Stay away from people who abuse alcohol or tempt you to drink.  Follow-up care  Follow up with your healthcare provider, or as advised. Contact these groups to get help:    Alcoholics Anonymous (AA): Go to www.aa.org or check the phone book for meetings near you.    National Alcohol and Substance Abuse Information Center (NASAIAlaMarka): 554.739.8684, www.addictioncareSenseHere Technology.Big Frame    National [place] on Alcoholism and Drug Dependence (NCADD): 698-SXP-UOJV (470-824-4525), www.ncadd.org  Call 911  Call 911 if any of these occur:    Trouble breathing or slow irregular breathing    Chest pain    Sudden weakness on one side of your body or sudden trouble speaking    Heavy bleeding or vomiting blood    Very drowsy or trouble awakening    Fainting or loss of consciousness    Rapid heart rate    Seizure  When to seek medical care  Call your healthcare provider right away if any of these occur:     Confusion    Hallucinations (seeing, hearing, or feeling things that aren t there)    Pain in your upper abdomen that gets worse    Repeated vomiting or black or tarry stools    Severe shakiness  Date Last Reviewed: 6/1/2016 2000-2017 [employer]. [address] [place] [address], [place] [address]. All rights reserved. This information is not intended as a substitute for professional medical care. Always follow your healthcare professional's instructions.          Discharge References/Attachments     HIGH BLOOD PRESSURE, WHAT IS?  (ENGLISH)      24 Hour  Appointment Hotline       To make an appointment at any [facility], call [phone] ([phone]). If you don't have a family doctor or clinic, we will help you find one. [facility] are conveniently located to serve the needs of you and your family.             Review of your medicines      Our records show that you are taking the medicines listed below. If these are incorrect, please call your family doctor or clinic.        Dose / Directions Last dose taken    ATIVAN PO   Dose:  0.25 mg        Take 0.25 mg by mouth daily as needed   Refills:  0        PAXIL PO   Dose:  30 mg        Take 30 mg by mouth daily   Refills:  0                Procedures and tests performed during your visit     EKG 12-lead, tracing only      Orders Needing Specimen Collection     None      Pending Results     No orders found from 6/22/2018 to 6/25/2018.            Pending Culture Results     No orders found from 6/22/2018 to 6/25/2018.            Pending Results Instructions     If you had any lab results that were not finalized at the time of your Discharge, you can call the ED Lab Result RN at [phone]. You will be contacted by this team for any positive Lab results or changes in treatment. The nurses are available 7 days a week from 10A to 6:30P.  You can leave a message 24 hours per day and they will return your call.        Test Results From Your Hospital Stay               Clinical Quality Measure: Blood Pressure Screening     Your blood pressure was checked while you were in the emergency department today. The last reading we obtained was  BP: (!) 172/103 . Please read the guidelines below about what these numbers mean and what you should do about them.  If your systolic blood pressure (the top number) is less than 120 and your diastolic blood pressure (the bottom number) is less than 80, then your blood pressure is normal. There is nothing more that you need to do about it.  If your systolic blood pressure  "(the top number) is 120-139 or your diastolic blood pressure (the bottom number) is 80-89, your blood pressure may be higher than it should be. You should have your blood pressure rechecked within a year by a primary care provider.  If your systolic blood pressure (the top number) is 140 or greater or your diastolic blood pressure (the bottom number) is 90 or greater, you may have high blood pressure. High blood pressure is treatable, but if left untreated over time it can put you at risk for heart attack, stroke, or kidney failure. You should have your blood pressure rechecked by a primary care provider within the next 4 weeks.  If your provider in the emergency department today gave you specific instructions to follow-up with your doctor or provider even sooner than that, you should follow that instruction and not wait for up to 4 weeks for your follow-up visit.        Thank you for choosing [facility]       Thank you for choosing [facility] for your care. Our goal is always to provide you with excellent care. Hearing back from our patients is one way we can continue to improve our services. Please take a few minutes to complete the written survey that you may receive in the mail after you visit with us. Thank you!        [employer] Information     [employer] lets you send messages to your doctor, view your test results, renew your prescriptions, schedule appointments and more. To sign up, go to [email or web address] . Click on \"Log in\" on the left side of the screen, which will take you to the Welcome page. Then click on \"Sign up Now\" on the right side of the page.     You will be asked to enter the access code listed below, as well as some personal information. Please follow the directions to create your username and password.     Your access code is: [email or web address]  Expires: 2018  3:37 PM     Your access code will  in 90 days. If you need help or a new code, please call your [facility] clinic or [phone].   "      Care EveryWhere ID     This is your Care EveryWhere ID. This could be used by other organizations to access your [facility] medical records  [id number]        Equal Access to Services     ALYSIA CLIFFORD : Harley Gonzalez, avani brennan, keith connolly, sierra dumont. So [facility] [phone].    ATENCIÓN: Si habla español, tiene a corea disposición servicios gratuitos de asistencia lingüística. Llame al [phone].    We comply with applicable federal civil rights laws and [place] laws. We do not discriminate on the basis of race, color, national origin, age, disability, sex, sexual orientation, or gender identity.            After Visit Summary       This is your record. Keep this with you and show to your community pharmacist(s) and doctor(s) at your next visit.

## 2018-06-24 NOTE — DISCHARGE INSTRUCTIONS
"  Alcohol Abuse  Alcoholic drinks are harmful when you have too many of them. There is no set number of drinks that defines too much. Drinking that disrupts your life or your health is called alcohol abuse. Alcohol abuse can hurt your relationships with others. You may lose friends, a spouse, or even your job. You may be abusing alcohol if any of the following are true for you:    Duties at home or with  suffer because of drinking.    Duties at work or in school suffer because of drinking.    You have missed work or school because of drinking.    You use alcohol while driving or operating machinery.    You have legal problems such as arrests due to drinking.    You keep drinking even though it causes serious problems in your life.  Health effects  Alcohol abuse causes health problems. Sometimes this can happen after only drinking a  little.\" There is no set number of drinks or amount of alcohol that defines too much. The more you drink at one time, and the more often you drink determine both the short-term and long-term health effects. It affects all parts of your body and your health, including your:    Brain. Alcohol is a central nervous system depressant. It can damage parts of the brain that affect your balance, memory, thinking, and emotions. It can cause memory loss, blackouts, depression, agitation, sleep cycle changes, and seizures. These changes may or may not be reversible.    Heart and vascular system. Alcohol affects multiple areas. It can damage heart muscle causing cardiomyopathy, which is a weakening and stretching of the heart muscle. This can lead to trouble breathing, an irregular heartbeat, atrial fibrillation, leg swelling, and heart failure. Alcohol use makes the blood vessels stiffen causing high blood pressure. All of these problems increase your risk of having heart attacks or strokes.    Liver. Alcohol causes fat to build up in the liver, affecting its normal function. This " increases the risk for hepatitis, leading to abdominal pain, appetite loss, jaundice, bleeding problems, liver fibrosis, and cirrhosis. This, in turn, can affect your ability to fight off infections, and can cause diabetes. The liver changes prevent it from removing toxins in your blood that can cause encephalopathy, which may show with confusion, altered level of consciousness, personality changes, memory loss, seizures, coma, and death.    Pancreas. Alcohol can cause inflammation of the pancreas, or pancreatitis. This can cause abdominal pain, fever, and diabetes.    Immune system. Alcohol weakens your immune system in a number of ways. It suppresses your immune system making it harder to fight infections and colds. It also increases the chance of getting pneumonia and tuberculosis.    Cancer. Alcohol is a risk factor for developing cancer of the mouth, esophagus, pharynx, larynx, liver, and breast.    Sexual function. Alcohol can lead to sexual problems.  Home care  The following guidelines will help you deal with alcohol abuse:    Admit you have a problem with alcohol.    Ask for help from your healthcare provider and trusted family members or close friends.    Get help from people trained in dealing with alcohol abuse. This may be individual counseling or group therapy, or it may be a supervised alcohol treatment program.    Join a self-help group for alcohol abuse such as Alcoholics Anonymous (AA).    Stay away from people who abuse alcohol or tempt you to drink.  Follow-up care  Follow up with your healthcare provider, or as advised. Contact these groups to get help:    Alcoholics Anonymous (AA): Go to www.aa.org or check the phone book for meetings near you.    National Alcohol and Substance Abuse Information Center (NASAIC): 927.830.6422, www.addictioncareoptions.com    National [place] on Alcoholism and Drug Dependence (NCADD): 517-HFL-SIDJ (829-798-3820), www.ncadd.org  Call 911  Call 911 if any of these  occur:    Trouble breathing or slow irregular breathing    Chest pain    Sudden weakness on one side of your body or sudden trouble speaking    Heavy bleeding or vomiting blood    Very drowsy or trouble awakening    Fainting or loss of consciousness    Rapid heart rate    Seizure  When to seek medical care  Call your healthcare provider right away if any of these occur:     Confusion    Hallucinations (seeing, hearing, or feeling things that aren t there)    Pain in your upper abdomen that gets worse    Repeated vomiting or black or tarry stools    Severe shakiness  Date Last Reviewed: 6/1/2016 2000-2017 [employer]. [address] [place] [address]. All rights reserved. This information is not intended as a substitute for professional medical care. Always follow your healthcare professional's instructions.

## 2018-06-24 NOTE — ED PROVIDER NOTES
"  History   Chief Complaint:  Generalized Weakness    HPI   Carlos A Connor is a 37 year old male with a history of cocaine and methamphetamine abuse who presents with generalized weakness. The patient reports that last night he went out with friends and \"got wasted.\" After coming home and sleeping for awhile, he awoke around 0400 and was diaphoretic, dizzy, nauseous, shaky, pruritic in the left arm, and had no energy in his body. He also reports having no appetite which he attributes to not feeling well overall. He is unsure if he used other drugs last night, and his last use of methamphetamine and cocaine was two months ago. He estimates he had around 20 drinks last night, but is again unsure exactly how much he drank. He takes both Ativan and Paxil, but has not taken these yesterday or today. He denies vomiting, diarrhea, chest pain, and shortness of breath.    Allergies:  Droperidol    Medications:    Ativan  Paxil    Past Medical History:    Anxiety  Chest pain  Migraines  Methamphetamine abuse  Cocaine abuse    Past Surgical History:    History reviewed. No pertinent surgical history.    Family History:    History reviewed. No pertinent family history.     Social History:  Smoking status: Former smoker  Alcohol use: Yes  Marital Status:  Single [1]    Review of Systems   Constitutional: Positive for appetite change and diaphoresis.   Respiratory: Negative for shortness of breath.    Cardiovascular: Negative for chest pain.   Gastrointestinal: Positive for nausea. Negative for diarrhea and vomiting.   Neurological: Positive for dizziness and weakness (Generalized).   All other systems reviewed and are negative.    Physical Exam   Patient Vitals for the past 24 hrs:   BP Temp Temp src Heart Rate Resp SpO2 Height Weight   06/24/18 1139 (!) 172/103 - - 118 20 97 % - -   06/24/18 1054 (!) 181/101 - - 131 18 97 % - -   06/24/18 1007 (!) 167/105 98.6  F (37  C) Oral 133 18 96 % 1.803 m (5' 11\") 95.3 kg (210 lb) "     Physical Exam  General: Alert and cooperative with exam. Patient in no acute distress. Normal mentation.  Head:  Scalp is NC/AT  Eyes:  No scleral icterus, Pupils equal and round.  ENT:  The external nose and ears are normal.  CV:  Tachycardic with regular rhythm.    No pathologic murmur, rubs, or gallops.  Resp:  Breath sounds are clear bilaterally.  No crackles, wheezes, rhonchi.    Non-labored, no retractions or accessory muscle use  GI:  Abdomen is soft, no distension, no tenderness. No peritoneal signs  MS:  No lower extremity edema   Skin:  Warm and dry, No rash or lesions noted.  Neuro: Oriented x 3. No gross motor deficits.  Psych: Awake. Alert. Normal affect. Appropriate interactions.    Emergency Department Course   ECG (10:30:19):  Rate 122 bpm. MO interval 146. QRS duration 86. QT/QTc 326/464. P-R-T axes 55 18 14. Sinus tachycardia. Possible Left atrial enlargement. Left ventricular hypertrophy. Nonspecific T wave abnormality. Abnormal ECG. No significant change compared to EKG dated 05/19/2018. Interpreted at 1042 by Stephanie Lima MD.    Interventions:  1025: NS 1L IV Bolus  1026: Zofran 4 mg IV  1059: NS 1L IV Bolus    Emergency Department Course:  Past medical records, nursing notes, and vitals reviewed.  1007: I performed an exam of the patient and obtained history, as documented above.  IV inserted and blood drawn.  EKG obtained, results above.    1017: Dr. Lima checked the patient.    1155: I rechecked the patient and he feels much better.  Heart rate and BP improved.    Findings and plan explained to the patient. Patient discharged home with instructions regarding supportive care, medications, and reasons to return. The importance of close follow-up was reviewed.     Impression & Plan    Medical Decision Making:  Carlos A Connor is a 37 year old male who presents with feelings of nausea, weakness, and malaise following a night of heavy drinking.  Patient history and records reviewed.   The patient admits to having consumed approximately 20 alcoholic beverages last night, and woke up this morning feeling poorly with nausea, malaise and fatigue.  I believe his heavy alcohol consumption last night is the most likely explanation for his symptoms this morning.  The patient's heart rate and blood pressure are elevated in the emergency department, so decision was made to obtain EKG which revealed sinus tachycardia likely secondary to dehydration.  The patient was given 2 L IV NS and Zofran with improvement in symptoms, and decrease in heart rate.  Discussed the patient's elevated blood pressure, and while this may be result of his alcohol abuse last night, recommended that he follow-up with primary care provider in 3 days for recheck.  Discussed indications to return to emergency department if any new or worsening symptoms.     Diagnosis:    ICD-10-CM   1. Alcohol abuse F10.10   2. Nausea R11.0   3. Elevated blood pressure reading without diagnosis of hypertension R03.0       Disposition:  Discharged to home.    Ceasar Lamar  6/24/2018    EMERGENCY DEPARTMENT  ICeasar, am serving as a scribe at 10:07 AM on 6/24/2018 to document services personally performed by Elijah Alejo PA-C based on my observations and the provider's statements to me.        Elijah Alejo PA-C  06/24/18 1776

## 2018-06-24 NOTE — ED AVS SNAPSHOT
Emergency Department    [address] [facility] [address]    Phone:  [phone]    Fax:  [phone]                                       Carlos A Connor   MRN: [id number]    Department:   Emergency Department   Date of Visit:  6/24/2018           After Visit Summary Signature Page     I have received my discharge instructions, and my questions have been answered. I have discussed any challenges I see with this plan with the nurse or doctor.    ..........................................................................................................................................  Patient/Patient Representative Signature      ..........................................................................................................................................  Patient Representative Print Name and Relationship to Patient    ..................................................               ................................................  Date                                            Time    ..........................................................................................................................................  Reviewed by Signature/Title    ...................................................              ..............................................  Date                                                            Time

## 2018-10-03 ENCOUNTER — HOSPITAL ENCOUNTER (EMERGENCY)
Facility: CLINIC | Age: 38
Discharge: HOME | End: 2018-10-03
Attending: EMERGENCY MEDICINE | Admitting: EMERGENCY MEDICINE
Payer: COMMERCIAL

## 2018-10-03 VITALS
HEART RATE: 91 BPM | HEIGHT: 71 IN | BODY MASS INDEX: 29.4 KG/M2 | TEMPERATURE: 99.4 F | DIASTOLIC BLOOD PRESSURE: 93 MMHG | SYSTOLIC BLOOD PRESSURE: 141 MMHG | OXYGEN SATURATION: 97 % | RESPIRATION RATE: 18 BRPM | WEIGHT: 210 LBS

## 2018-10-03 DIAGNOSIS — R19.7 DIARRHEA OF PRESUMED INFECTIOUS ORIGIN: ICD-10-CM

## 2018-10-03 PROCEDURE — 99283 EMERGENCY DEPT VISIT LOW MDM: CPT

## 2018-10-03 PROCEDURE — 25000132 ZZH RX MED GY IP 250 OP 250 PS 637: Performed by: EMERGENCY MEDICINE

## 2018-10-03 RX ORDER — CIPROFLOXACIN 500 MG/1
500 TABLET, FILM COATED ORAL 2 TIMES DAILY
Qty: 6 TABLET | Refills: 0 | Status: SHIPPED | OUTPATIENT
Start: 2018-10-03 | End: 2018-10-06

## 2018-10-03 RX ORDER — IBUPROFEN 600 MG/1
600 TABLET, FILM COATED ORAL ONCE
Status: COMPLETED | OUTPATIENT
Start: 2018-10-03 | End: 2018-10-03

## 2018-10-03 RX ADMIN — IBUPROFEN 600 MG: 600 TABLET, FILM COATED ORAL at 07:27

## 2018-10-03 ASSESSMENT — ENCOUNTER SYMPTOMS
VOMITING: 0
CHILLS: 1
HEMATURIA: 0
ABDOMINAL PAIN: 1
FEVER: 1
BLOOD IN STOOL: 0
COUGH: 0
ABDOMINAL DISTENTION: 0
NAUSEA: 0
DYSURIA: 0
DIARRHEA: 1
DIFFICULTY URINATING: 0
SORE THROAT: 0

## 2018-10-03 NOTE — DISCHARGE INSTRUCTIONS
Discharge Instructions  Adult Diarrhea    You have been seen today for diarrhea (loose stools). This is usually caused by a virus, but some bacteria, parasites, medicines, or other medical conditions can cause similar symptoms. At this time your provider does not find that your diarrhea is a sign of anything dangerous or life-threatening. However, sometimes the signs of serious illness do not show up right away. If you have new or worse symptoms, you may need to be seen again in the Emergency Department or by your primary provider.     Generally, every Emergency Department visit should have a follow-up clinic visit with either a primary or a specialty clinic/provider. Please follow-up as instructed by your emergency provider today.    Return to the Emergency Department if:    You feel you are getting dehydrated, such as being very thirsty, not urinating (peeing) like usual, or feeling faint or lightheaded.     You develop a new fever.    You have abdominal (belly) pain that seems worse than cramps, is in one spot, or is getting worse over time.     You have blood in your stool or your stool becomes black.  (Remember that if you take Pepto-Bismol , this will turn your stool black).     You feel very weak.    What can I do to help myself?    The most important thing to do is to drink clear liquids.   It is best to have only small, frequent sips of liquids. Drinking too much at once may cause more diarrhea. You should also replace minerals, sodium and potassium lost with diarrhea. Pedialyte  and sports drinks can help you replace these minerals. You can also drink clear liquids such as water, weak tea, apple juice, and 7-Up . Avoid acidic liquids (orange juice), caffeine (coffee) or alcohol. Milk products will make the diarrhea worse.    Eat bland (plain) foods. Soda crackers, toast, plain noodles, gelatin, applesauce and bananas are good first choices. Avoid foods that have acid, are spicy, fatty or fibrous (such as  "meats, coarse grains, vegetables). You may start eating these foods again in about 3 days when you are better.     Sometimes treatment includes prescription medicine to prevent diarrhea. If your provider prescribes these for you, take them as directed.     Nonprescription medicine is available for the treatment of diarrhea and can be very effective. If you use it, make sure you use the dose recommended on the package. Check with your healthcare provider before you use any medicine for diarrhea.     Do not take ibuprofen, or other nonsteroidal anti-inflammatory medicines, without checking with your healthcare provider.   Probiotics: If you have been given an antibiotic, you may want to also take a probiotic pill or eat yogurt with live cultures. Probiotics have \"good bacteria\" to help your intestines stay healthy. Studies have shown that probiotics help prevent diarrhea and other intestine problems (including C. diff infection) when you take antibiotics. You can buy these without a prescription in the pharmacy section of the store.   If you were given a prescription for medicine here today, be sure to read all of the information (including the package insert) that comes with your prescription.  This will include important information about the medicine, its side effects, and any warnings that you need to know about.  The pharmacist who fills the prescription can provide more information and answer questions you may have about the medicine.  If you have questions or concerns that the pharmacist cannot address, please call or return to the Emergency Department.  Remember that you can always come back to the Emergency Department if you are not able to see your regular provider in the amount of time listed above, if you get any new symptoms, or if there is anything that worries you.  "

## 2018-10-03 NOTE — ED AVS SNAPSHOT
Emergency Department    [address] [facility] [address]    Phone:  [phone]    Fax:  [phone]                                       Carlos A Connor   MRN: [id number]    Department:   Emergency Department   Date of Visit:  10/3/2018           Patient Information     Date Of Birth          1980        Your diagnoses for this visit were:     Diarrhea of presumed infectious origin        You were seen by Dani Olivier MD.      Follow-up Information     Follow up with Wegener, Joel Daniel Irwin, MD.    Specialty:  Family Practice    Contact information:    [id number]  [facility] [place] [address] [phone]          Discharge Instructions         Discharge Instructions  Adult Diarrhea    You have been seen today for diarrhea (loose stools). This is usually caused by a virus, but some bacteria, parasites, medicines, or other medical conditions can cause similar symptoms. At this time your provider does not find that your diarrhea is a sign of anything dangerous or life-threatening. However, sometimes the signs of serious illness do not show up right away. If you have new or worse symptoms, you may need to be seen again in the Emergency Department or by your primary provider.     Generally, every Emergency Department visit should have a follow-up clinic visit with either a primary or a specialty clinic/provider. Please follow-up as instructed by your emergency provider today.    Return to the Emergency Department if:    You feel you are getting dehydrated, such as being very thirsty, not urinating (peeing) like usual, or feeling faint or lightheaded.     You develop a new fever.    You have abdominal (belly) pain that seems worse than cramps, is in one spot, or is getting worse over time.     You have blood in your stool or your stool becomes black.  (Remember that if you take Pepto-Bismol , this will turn your stool black).     You feel very weak.    What can I do to help  "myself?    The most important thing to do is to drink clear liquids.   It is best to have only small, frequent sips of liquids. Drinking too much at once may cause more diarrhea. You should also replace minerals, sodium and potassium lost with diarrhea. Pedialyte  and sports drinks can help you replace these minerals. You can also drink clear liquids such as water, weak tea, apple juice, and 7-Up . Avoid acidic liquids (orange juice), caffeine (coffee) or alcohol. Milk products will make the diarrhea worse.    Eat bland (plain) foods. Soda crackers, toast, plain noodles, gelatin, applesauce and bananas are good first choices. Avoid foods that have acid, are spicy, fatty or fibrous (such as meats, coarse grains, vegetables). You may start eating these foods again in about 3 days when you are better.     Sometimes treatment includes prescription medicine to prevent diarrhea. If your provider prescribes these for you, take them as directed.     Nonprescription medicine is available for the treatment of diarrhea and can be very effective. If you use it, make sure you use the dose recommended on the package. Check with your healthcare provider before you use any medicine for diarrhea.     Do not take ibuprofen, or other nonsteroidal anti-inflammatory medicines, without checking with your healthcare provider.   Probiotics: If you have been given an antibiotic, you may want to also take a probiotic pill or eat yogurt with live cultures. Probiotics have \"good bacteria\" to help your intestines stay healthy. Studies have shown that probiotics help prevent diarrhea and other intestine problems (including C. diff infection) when you take antibiotics. You can buy these without a prescription in the pharmacy section of the store.   If you were given a prescription for medicine here today, be sure to read all of the information (including the package insert) that comes with your prescription.  This will include important " information about the medicine, its side effects, and any warnings that you need to know about.  The pharmacist who fills the prescription can provide more information and answer questions you may have about the medicine.  If you have questions or concerns that the pharmacist cannot address, please call or return to the Emergency Department.  Remember that you can always come back to the Emergency Department if you are not able to see your regular provider in the amount of time listed above, if you get any new symptoms, or if there is anything that worries you.    24 Hour Appointment Hotline       To make an appointment at any [facility], call [phone] ([phone]). If you don't have a family doctor or clinic, we will help you find one. [facility] clinics are conveniently located to serve the needs of you and your family.             Review of your medicines      START taking        Dose / Directions Last dose taken    ciprofloxacin 500 MG tablet   Commonly known as:  CIPRO   Dose:  500 mg   Quantity:  6 tablet        Take 1 tablet (500 mg) by mouth 2 times daily for 3 days   Refills:  0          Our records show that you are taking the medicines listed below. If these are incorrect, please call your family doctor or clinic.        Dose / Directions Last dose taken    ATIVAN PO   Dose:  0.25 mg        Take 0.25 mg by mouth daily as needed   Refills:  0        PAXIL PO   Dose:  30 mg        Take 30 mg by mouth daily   Refills:  0                Prescriptions were sent or printed at these locations (1 Prescription)                   Other Prescriptions                Printed at Department/Unit printer (1 of 1)         ciprofloxacin (CIPRO) 500 MG tablet                Orders Needing Specimen Collection     None      Pending Results     No orders found from 10/1/2018 to 10/4/2018.            Pending Culture Results     No orders found from 10/1/2018 to 10/4/2018.            Pending Results Instructions      If you had any lab results that were not finalized at the time of your Discharge, you can call the ED Lab Result RN at [phone]. You will be contacted by this team for any positive Lab results or changes in treatment. The nurses are available 7 days a week from 10A to 6:30P.  You can leave a message 24 hours per day and they will return your call.        Test Results From Your Hospital Stay               Clinical Quality Measure: Blood Pressure Screening     Your blood pressure was checked while you were in the emergency department today. The last reading we obtained was  BP: 151/90 . Please read the guidelines below about what these numbers mean and what you should do about them.  If your systolic blood pressure (the top number) is less than 120 and your diastolic blood pressure (the bottom number) is less than 80, then your blood pressure is normal. There is nothing more that you need to do about it.  If your systolic blood pressure (the top number) is 120-139 or your diastolic blood pressure (the bottom number) is 80-89, your blood pressure may be higher than it should be. You should have your blood pressure rechecked within a year by a primary care provider.  If your systolic blood pressure (the top number) is 140 or greater or your diastolic blood pressure (the bottom number) is 90 or greater, you may have high blood pressure. High blood pressure is treatable, but if left untreated over time it can put you at risk for heart attack, stroke, or kidney failure. You should have your blood pressure rechecked by a primary care provider within the next 4 weeks.  If your provider in the emergency department today gave you specific instructions to follow-up with your doctor or provider even sooner than that, you should follow that instruction and not wait for up to 4 weeks for your follow-up visit.        Thank you for choosing [facility]       Thank you for choosing [facility] for your care. Our goal is always to  "provide you with excellent care. Hearing back from our patients is one way we can continue to improve our services. Please take a few minutes to complete the written survey that you may receive in the mail after you visit with us. Thank you!        [employer] Information     [employer] lets you send messages to your doctor, view your test results, renew your prescriptions, schedule appointments and more. To sign up, go to [email or web address] [email or web address] . Click on \"Log in\" on the left side of the screen, which will take you to the Welcome page. Then click on \"Sign up Now\" on the right side of the page.     You will be asked to enter the access code listed below, as well as some personal information. Please follow the directions to create your username and password.     Your access code is: [email or web address]  Expires: 2019  7:22 AM     Your access code will  in 90 days. If you need help or a new code, please call your [facility] clinic or [phone].        Care EveryWhere ID     This is your Care EveryWhere ID. This could be used by other organizations to access your [facility] medical records  [id number]        Equal Access to Services     ALYSIA CLIFFORD : Harley Gonzalez, waestuardo brennan, keith connolly, sierra dumont. So [facility] [phone].    ATENCIÓN: Si habla español, tiene a corea disposición servicios gratuitos de asistencia lingüística. Rebekah al [phone].    We comply with applicable federal civil rights laws and [place] laws. We do not discriminate on the basis of race, color, national origin, age, disability, sex, sexual orientation, or gender identity.            After Visit Summary       This is your record. Keep this with you and show to your community pharmacist(s) and doctor(s) at your next visit.                  "

## 2018-10-03 NOTE — ED AVS SNAPSHOT
Emergency Department    [address] [facility] [address]    Phone:  [phone]    Fax:  [phone]                                       Carlos A Connor   MRN: [id number]    Department:   Emergency Department   Date of Visit:  10/3/2018           After Visit Summary Signature Page     I have received my discharge instructions, and my questions have been answered. I have discussed any challenges I see with this plan with the nurse or doctor.    ..........................................................................................................................................  Patient/Patient Representative Signature      ..........................................................................................................................................  Patient Representative Print Name and Relationship to Patient    ..................................................               ................................................  Date                                   Time    ..........................................................................................................................................  Reviewed by Signature/Title    ...................................................              ..............................................  Date                                               Time          22EPIC Rev 08/18

## 2018-10-03 NOTE — ED PROVIDER NOTES
"  History     Chief Complaint:  Diarrhea    HPI   Carlos A Connor is a 38 year old male who presents for evaluation of diarrhea. The patient reports he developed chills and a low grade fever 2 days ago. Yesterday, he starting having nonbloody diarrhea with associated generalized abdominal cramping. Diarrhea persisted today, prompting his visit here. He has also had some mild aching in his bilateral lower back. He denies any cough, runny nose, nausea, vomiting, sore throat. He denies any melena. He denies urinary symptoms including no hematuria, dysuria. He denies any recent travel or recent antibiotic use. No prior abdominal surgeries.     Allergies:  Droperidol    Medications:    Lorazepam  Paroxetine    Past Medical History:    Anxiety  History of methamphetamine abuse  History of cocaine abuse  Migraines    Past Surgical History:    History reviewed. No pertinent surgical history.    Family History:    History reviewed. No pertinent family history.     Social History:  Smoking status: Former smoker, quit 2008  Alcohol use: Occasional  Marital Status:  Single [1]     Review of Systems   Constitutional: Positive for chills and fever.   HENT: Negative for congestion and sore throat.    Respiratory: Negative for cough.    Gastrointestinal: Positive for abdominal pain and diarrhea. Negative for abdominal distention, blood in stool, nausea and vomiting.   Genitourinary: Negative for difficulty urinating, dysuria and hematuria.   All other systems reviewed and are negative.      Physical Exam     Patient Vitals for the past 24 hrs:   BP Temp Temp src Pulse Resp SpO2 Height Weight   10/03/18 0648 151/90 99.4  F (37.4  C) Oral 98 17 98 % 1.803 m (5' 11\") 95.3 kg (210 lb)       Physical Exam  Constitutional: The patient is oriented to person, place, and time.   HENT:   Head: Atraumatic  Right Ear: Normal  Left Ear: Normal  Nose: Nose normal.   Mouth/Throat: Oropharynx is clear and moist. No erythema or exudate.   Eyes: " Conjunctivae and EOM are normal. Pupils are equal, round, and reactive to light. No discharge  Neck: Normal range of motion. Neck supple.   Cardiovascular: Normal rate, regular rhythm, no murmur gallops or rubs. Intact distal pulses.    Pulmonary/Chest: CTA bilaterally. No wheezes rale or rhonchi.  Abdominal: Soft. Non tender.  No masses   Musculoskeletal: No edema. No bony deformity. Normal range of motion  Lymphadenopathy:     The patient has no cervical adenopathy.   Neurological: The patient is alert and oriented to person, place, and time. The patient has normal strength and normal reflexes. No cranial nerve deficit. Coordination normal.   Skin: Skin is warm and dry. No rash noted. The patient is not diaphoretic.   Psychiatric: The patient has a normal mood and affect.      Emergency Department Course   Interventions:  Ibuprofen 600 mg oral    Emergency Department Course:  Past medical records, nursing notes, and vitals reviewed.  0708: I performed an exam of the patient and obtained history, as documented above.    I rechecked the patient.  Findings and plan explained to the Patient. Patient discharged home with instructions regarding supportive care, medications, and reasons to return. The importance of close follow-up was reviewed.     Impression & Plan      Medical Decision Making:  Carlos A Connor is a 38 year old male who presents for evaluation of diarrhea. The differential diagnosis of diarrhea is broad and includes such etiologies as viral gastroenteritis, traveler's diarrhea, giardia, colitis, bacterial infection of the large intestine, etc.  There are no signs of worrisome intra-abdominal pathologies detected during the visit today and no blood per patient report.  The patient has a completely benign abdominal exam without rebound, guarding, or marked tenderness to palpation. Given report of fever at home, with discharge home on a course of Ciprofloxacin.  No indication for stool studies or  advanced imaging at this time. Reasons to return to the ED were discussed with the patient. He was discharged home in stable condition.     Diagnosis:    ICD-10-CM   1. Diarrhea of presumed infectious origin R19.7     Disposition: Discharged to home    Discharge Medications:  New Prescriptions    CIPROFLOXACIN (CIPRO) 500 MG TABLET    Take 1 tablet (500 mg) by mouth 2 times daily for 3 days     Pina Almonte  10/3/2018    EMERGENCY DEPARTMENT    I, Pina Almonte, am serving as a scribe at 7:08 AM on 10/3/2018 to document services personally performed by Dani Olivier MD based on my observations and the provider's statements to me.        Dani Olivier MD  10/03/18 0110

## 2018-10-03 NOTE — LETTER
October 3, 2018      To Whom It May Concern:      Carlos A Connor was seen in our Emergency Department today, 10/03/18.  I expect his condition to improve over the next 2 days.  He may return to work/school when improved.    Sincerely,        Dani Olivier MD

## 2018-11-10 ENCOUNTER — HOSPITAL ENCOUNTER (EMERGENCY)
Facility: CLINIC | Age: 38
Discharge: HOME | End: 2018-11-10
Attending: EMERGENCY MEDICINE | Admitting: EMERGENCY MEDICINE

## 2018-11-10 VITALS
HEART RATE: 110 BPM | DIASTOLIC BLOOD PRESSURE: 75 MMHG | RESPIRATION RATE: 16 BRPM | SYSTOLIC BLOOD PRESSURE: 132 MMHG | HEIGHT: 71 IN | TEMPERATURE: 98.4 F | BODY MASS INDEX: 29.4 KG/M2 | WEIGHT: 210 LBS | OXYGEN SATURATION: 93 %

## 2018-11-10 DIAGNOSIS — R42 DIZZINESS: ICD-10-CM

## 2018-11-10 DIAGNOSIS — F12.90 MARIJUANA USE: ICD-10-CM

## 2018-11-10 DIAGNOSIS — R00.2 PALPITATIONS: ICD-10-CM

## 2018-11-10 LAB
ANION GAP SERPL CALCULATED.3IONS-SCNC: 8 MMOL/L (ref 3–14)
BUN SERPL-MCNC: 14 MG/DL (ref 7–30)
CALCIUM SERPL-MCNC: 8.7 MG/DL (ref 8.5–10.1)
CHLORIDE SERPL-SCNC: 107 MMOL/L (ref 94–109)
CO2 SERPL-SCNC: 23 MMOL/L (ref 20–32)
CREAT SERPL-MCNC: 0.67 MG/DL (ref 0.66–1.25)
GFR SERPL CREATININE-BSD FRML MDRD: >90 ML/MIN/1.7M2
GLUCOSE SERPL-MCNC: 137 MG/DL (ref 70–99)
INTERPRETATION ECG - MUSE: NORMAL
POTASSIUM SERPL-SCNC: 3.9 MMOL/L (ref 3.4–5.3)
SODIUM SERPL-SCNC: 138 MMOL/L (ref 133–144)

## 2018-11-10 PROCEDURE — 93005 ELECTROCARDIOGRAM TRACING: CPT

## 2018-11-10 PROCEDURE — 99285 EMERGENCY DEPT VISIT HI MDM: CPT | Mod: 25

## 2018-11-10 PROCEDURE — 96361 HYDRATE IV INFUSION ADD-ON: CPT

## 2018-11-10 PROCEDURE — 96374 THER/PROPH/DIAG INJ IV PUSH: CPT

## 2018-11-10 PROCEDURE — 80048 BASIC METABOLIC PNL TOTAL CA: CPT | Performed by: EMERGENCY MEDICINE

## 2018-11-10 PROCEDURE — 96375 TX/PRO/DX INJ NEW DRUG ADDON: CPT

## 2018-11-10 PROCEDURE — 25000128 H RX IP 250 OP 636: Performed by: EMERGENCY MEDICINE

## 2018-11-10 RX ORDER — DIPHENHYDRAMINE HYDROCHLORIDE 50 MG/ML
12.5 INJECTION INTRAMUSCULAR; INTRAVENOUS ONCE
Status: COMPLETED | OUTPATIENT
Start: 2018-11-10 | End: 2018-11-10

## 2018-11-10 RX ORDER — LORAZEPAM 2 MG/ML
0.5 INJECTION INTRAMUSCULAR ONCE
Status: COMPLETED | OUTPATIENT
Start: 2018-11-10 | End: 2018-11-10

## 2018-11-10 RX ADMIN — LORAZEPAM 0.5 MG: 2 INJECTION INTRAMUSCULAR; INTRAVENOUS at 00:42

## 2018-11-10 RX ADMIN — DIPHENHYDRAMINE HYDROCHLORIDE 12.5 MG: 50 INJECTION, SOLUTION INTRAMUSCULAR; INTRAVENOUS at 00:41

## 2018-11-10 RX ADMIN — SODIUM CHLORIDE 1000 ML: 9 INJECTION, SOLUTION INTRAVENOUS at 00:36

## 2018-11-10 NOTE — ED PROVIDER NOTES
"  History     Chief Complaint:    Palpitations       HPI   Carlos A Connor is a 38 year old male who presents with ***    Allergies:  Droperidol     Medications:    Lorazepam  Paroxetine     Past Medical History:    Anxiety    Past Surgical History:    The patient does not have any pertinent past surgical history.    Family History:    No past pertinent family history.    Social History:  Negative for tobacco use.  Positive for alcohol use, rarely.   Marital Status:   [4]     Review of Systems  ***    Physical Exam   First Vitals:  BP: (!) 167/95  Pulse: 110  Heart Rate: 110  Temp: 98.4  F (36.9  C)  Resp: 20  Height: 180.3 cm (5' 11\")  Weight: 95.3 kg (210 lb)  SpO2: 98 %      Physical Exam  ***    Emergency Department Course   ECG:  ***    Imaging:  {Radiographic findings?:[id number]::\" \"}  ***    Laboratory:  ***    Procedures:  ***        Interventions:  Medications - No data to display      Emergency Department Course:  Nursing notes and vitals reviewed. (***) I performed an exam of the patient as documented above.     IV inserted. Medicine administered as documented above. Blood drawn. This was sent to the lab for further testing, results above. ***    The patient was sent for a *** while in the emergency department, findings above.     *** I rechecked the patient and discussed the results of his***her workup thus far.     Findings and plan explained to the {PATIENT, FAMILY MEMBER, CAREGIVER:[id number]}. Patient discharged home with instructions regarding supportive care, medications, and reasons to return. The importance of close follow-up was reviewed. The patient was prescribed ***    I personally reviewed the laboratory results with the {PATIENT, FAMILY MEMBER, CAREGIVER:[id number]} and answered all related questions prior to discharge. ***        Impression & Plan       {trauma activation?:490539::\" \"}  CMS Diagnoses: {Sepsis/Septic Shock/Stemi/Stroke:572287::\" \"}       Medical Decision " "Making:  ***  Critical Care time:  {none or minutes:476342::\"none\"}    Diagnosis:  No diagnosis found.    Disposition:  {discharged to home/discharged to home with.../Admitted to...:474246}    Discharge Medications:  New Prescriptions    No medications on file          Zeeshan Fei  11/10/2018    EMERGENCY DEPARTMENT    "

## 2018-11-10 NOTE — ED AVS SNAPSHOT
Emergency Department    [address] [facility] [address]    Phone:  [phone]    Fax:  [phone]                                       Carlos A Connor   MRN: [id number]    Department:   Emergency Department   Date of Visit:  11/10/2018           Patient Information     Date Of Birth          1980        Your diagnoses for this visit were:     Palpitations     Dizziness     Marijuana use        You were seen by Doyle Orozco MD.      Follow-up Information     Follow up with Wegener, Joel Daniel Irwin, MD. Call in 1 day.    Specialty:  Family Practice    Contact information:    [address]  [facility] [address] [phone]          Follow up with  Emergency Department.    Specialty:  EMERGENCY MEDICINE    Why:  As needed, If symptoms worsen    Contact information:    [address] [facility] [address] [phone]      Discharge References/Attachments     PALPITATIONS (ENGLISH)      24 Hour Appointment Hotline       To make an appointment at any [facility], call [phone] ([phone]). If you don't have a family doctor or clinic, we will help you find one. [facility] clinics are conveniently located to serve the needs of you and your family.             Review of your medicines      Our records show that you are taking the medicines listed below. If these are incorrect, please call your family doctor or clinic.        Dose / Directions Last dose taken    LORAZEPAM PO   Dose:  0.5 mg        Take 0.5 mg by mouth daily   Refills:  0        PAROXETINE HCL PO   Dose:  40 mg        Take 40 mg by mouth daily   Refills:  0                Procedures and tests performed during your visit     Basic metabolic panel    EKG 12 lead    Peripheral IV catheter      Orders Needing Specimen Collection     None      Pending Results     No orders found from 11/8/2018 to 11/11/2018.            Pending Culture Results     No orders found from 11/8/2018 to  11/11/2018.            Pending Results Instructions     If you had any lab results that were not finalized at the time of your Discharge, you can call the ED Lab Result RN at [phone]. You will be contacted by this team for any positive Lab results or changes in treatment. The nurses are available 7 days a week from 10A to 6:30P.  You can leave a message 24 hours per day and they will return your call.        Test Results From Your Hospital Stay        11/10/2018 12:56 AM      Component Results     Component Value Ref Range & Units Status    Sodium 138 133 - 144 mmol/L Final    Potassium 3.9 3.4 - 5.3 mmol/L Final    Chloride 107 94 - 109 mmol/L Final    Carbon Dioxide 23 20 - 32 mmol/L Final    Anion Gap 8 3 - 14 mmol/L Final    Glucose 137 (H) 70 - 99 mg/dL Final    Urea Nitrogen 14 7 - 30 mg/dL Final    Creatinine 0.67 0.66 - 1.25 mg/dL Final    GFR Estimate >90 >60 mL/min/1.7m2 Final    Non  GFR Calc    GFR Estimate If Black >90 >60 mL/min/1.7m2 Final    African American GFR Calc    Calcium 8.7 8.5 - 10.1 mg/dL Final                Clinical Quality Measure: Blood Pressure Screening     Your blood pressure was checked while you were in the emergency department today. The last reading we obtained was  BP: 132/75 . Please read the guidelines below about what these numbers mean and what you should do about them.  If your systolic blood pressure (the top number) is less than 120 and your diastolic blood pressure (the bottom number) is less than 80, then your blood pressure is normal. There is nothing more that you need to do about it.  If your systolic blood pressure (the top number) is 120-139 or your diastolic blood pressure (the bottom number) is 80-89, your blood pressure may be higher than it should be. You should have your blood pressure rechecked within a year by a primary care provider.  If your systolic blood pressure (the top number) is 140 or greater or your diastolic blood pressure  "(the bottom number) is 90 or greater, you may have high blood pressure. High blood pressure is treatable, but if left untreated over time it can put you at risk for heart attack, stroke, or kidney failure. You should have your blood pressure rechecked by a primary care provider within the next 4 weeks.  If your provider in the emergency department today gave you specific instructions to follow-up with your doctor or provider even sooner than that, you should follow that instruction and not wait for up to 4 weeks for your follow-up visit.        Thank you for choosing [facility]       Thank you for choosing [facility] for your care. Our goal is always to provide you with excellent care. Hearing back from our patients is one way we can continue to improve our services. Please take a few minutes to complete the written survey that you may receive in the mail after you visit with us. Thank you!        [employer] Information     [employer] lets you send messages to your doctor, view your test results, renew your prescriptions, schedule appointments and more. To sign up, go to [email or web address] . Click on \"Log in\" on the left side of the screen, which will take you to the Welcome page. Then click on \"Sign up Now\" on the right side of the page.     You will be asked to enter the access code listed below, as well as some personal information. Please follow the directions to create your username and password.     Your access code is: [email or web address]  Expires: 2019  1:27 AM     Your access code will  in 90 days. If you need help or a new code, please call your [facility] clinic or [phone].        Care EveryWhere ID     This is your Care EveryWhere ID. This could be used by other organizations to access your [facility] medical records  [id number]        Equal Access to Services     ALYSIA CLIFFORD AH: Harley Gonzalez, avani brennan, sierra keys. So jairo " [phone].    ATENCIÓN: Si habla español, tiene a corea disposición servicios gratuitos de asistencia lingüística. Llame al [phone].    We comply with applicable federal civil rights laws and [place] laws. We do not discriminate on the basis of race, color, national origin, age, disability, sex, sexual orientation, or gender identity.            After Visit Summary       This is your record. Keep this with you and show to your community pharmacist(s) and doctor(s) at your next visit.

## 2018-11-10 NOTE — ED AVS SNAPSHOT
Emergency Department    [address] [facility] [address]    Phone:  [phone]    Fax:  [phone]                                       Carlos A Connor   MRN: [id number]    Department:   Emergency Department   Date of Visit:  11/10/2018           After Visit Summary Signature Page     I have received my discharge instructions, and my questions have been answered. I have discussed any challenges I see with this plan with the nurse or doctor.    ..........................................................................................................................................  Patient/Patient Representative Signature      ..........................................................................................................................................  Patient Representative Print Name and Relationship to Patient    ..................................................               ................................................  Date                                   Time    ..........................................................................................................................................  Reviewed by Signature/Title    ...................................................              ..............................................  Date                                               Time          22EPIC Rev 08/18

## 2018-11-10 NOTE — ED PROVIDER NOTES
"  History     Chief Complaint:  Palpitations     HPI   Carlos A Connor is a 38 year old male with a history of anxiety who presents to the emergency department today for evaluation of palpitations. Patient reports that after playing soccer tonight with some friends, they went to go eat when patient was unknowingly given a cookie laced with marijuana around 2200, per his report. He is now feeling his heart racing with associated itchiness and \"spinning.\" He denies history of smoking marijuana, did not use meth or cocaine, and has no history of heart problems. Patient has multiple prior ED visits for anxiety and palpitations and chest symptoms.    Allergies:  Droperidol      Medications:    Lorazepam  Paroxetine     Past Medical History:    Anxiety  No history of heart problems.     Past Surgical History:    Surgical history reviewed. No pertinent surgical history.     Family History:    Family history reviewed. No pertinent family history.     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     Review of Systems   All other systems reviewed and are negative.    Physical Exam     Patient Vitals for the past 24 hrs:   BP Temp Temp src Pulse Heart Rate Resp SpO2 Height Weight   11/10/18 0100 132/75 - - - 96 16 93 % - -   11/10/18 0045 152/80 - - - 101 22 96 % - -   11/10/18 0030 (!) 155/94 - - - 112 20 96 % - -   11/10/18 0011 (!) 167/95 98.4  F (36.9  C) Oral 110 110 20 98 % 1.803 m (5' 11\") 95.3 kg (210 lb)     Physical Exam  General: nontoxic appearing male sitting upright in room 15  HENT: mucous membranes moist, thyroid nonpalpable  CV: slightly tachycardic rate, regular rhythm, no lower extremity edema, no JVD, palpable symmetric radial pulses, no murmur audible  Resp: clear throughout, normal effort, no crackles or wheezing  GI: abdomen soft and nontender  MSK: no bony tenderness   Skin: " appropriately warm and dry  Neuro: alert, clear speech, oriented, EOMI, face symmetric,  normal, no ataxia  Psych: anxious, cooperative    Emergency Department Course     ECG:  ECG taken at 0015, ECG read at 0020  Sinus tachycardia  Nonspecific T wave abnormality   Abnormal ECG  Rate 112 bpm. HI interval 132 ms. QRS duration 84 ms. QT/QTc 324/442 ms. P-R-T axes 57 16 8.    Laboratory:  Laboratory findings were communicated with the patient who voiced understanding of the findings.    BMP: Glucose 137(H) o/w WNL (Creatinine 0.67)    Interventions:  0036 NS 1,000 mL IV  0041 Benadryl 12.5 mg IV  0042 Ativan 0.5 mg IV    Emergency Department Course:    0015 EKG taken as noted above.     0017 Nursing notes and vitals reviewed.    0022 I performed an exam of the patient as documented above.     The patient was placed on continuous cardiac and pulse ox monitoring.    0032 IV was inserted and blood was drawn for laboratory testing, results above.    0120 Recheck and update. He is feeling better.     0145 I personally reviewed the lab results with the patient and answered all related questions prior to discharge.    Impression & Plan      Medical Decision Making:  I think his symptoms are most likely due to adverse effects from the dessert he ingested tonight, which was accidental and not motivated by any worrisome psychiatric condition, though he does demonstrate comorbid anxiety and was eager for dose of anxiolytic which I felt was reasonable.  With ongoing symptoms of palpitations, he has a sinus rhythm and his tachycardia gradually resolved while in the ED.  He was given IV fluids and treated symptomatically.  No evidence of anaphylactic reaction and I do not think he requires epinephrine or steroids.  No focal neurologic symptoms to suggest the need for neuro imaging.  He feels better and is eager for discharge home which I felt was safe, with recommendation to return for sudden worsening otherwise follow-up soon  through clinic.    Diagnosis:    ICD-10-CM    1. Palpitations R00.2    2. Dizziness R42    3. Marijuana use F12.90      Disposition:   The patient is discharged to home.    This record was created at least in part using electronic voice recognition software, so please excuse any typographical errors.     Discharge Medications:  No discharge medications.     Scribe Disclosure:  Ernestine NJ, am serving as a scribe at 12:19 AM on 11/10/2018 to document services personally performed by Doyle Orozco MD based on my observations and the provider's statements to me.       EMERGENCY DEPARTMENT       Doyle Orozco MD  11/10/18 0444

## 2018-12-12 ENCOUNTER — HOSPITAL ENCOUNTER (EMERGENCY)
Facility: CLINIC | Age: 38
Discharge: HOME | End: 2018-12-12
Attending: EMERGENCY MEDICINE | Admitting: EMERGENCY MEDICINE

## 2018-12-12 ENCOUNTER — APPOINTMENT (OUTPATIENT)
Dept: CT IMAGING | Facility: CLINIC | Age: 38
End: 2018-12-12
Attending: EMERGENCY MEDICINE

## 2018-12-12 VITALS
BODY MASS INDEX: 29.4 KG/M2 | OXYGEN SATURATION: 97 % | SYSTOLIC BLOOD PRESSURE: 139 MMHG | DIASTOLIC BLOOD PRESSURE: 92 MMHG | TEMPERATURE: 97.8 F | HEIGHT: 71 IN | HEART RATE: 72 BPM | WEIGHT: 210 LBS | RESPIRATION RATE: 17 BRPM

## 2018-12-12 DIAGNOSIS — R11.2 NAUSEA AND VOMITING, INTRACTABILITY OF VOMITING NOT SPECIFIED, UNSPECIFIED VOMITING TYPE: ICD-10-CM

## 2018-12-12 DIAGNOSIS — R42 DIZZINESS: ICD-10-CM

## 2018-12-12 LAB
ANION GAP SERPL CALCULATED.3IONS-SCNC: 9 MMOL/L (ref 3–14)
BASOPHILS # BLD AUTO: 0 10E9/L (ref 0–0.2)
BASOPHILS NFR BLD AUTO: 0 %
BUN SERPL-MCNC: 17 MG/DL (ref 7–30)
CALCIUM SERPL-MCNC: 9 MG/DL (ref 8.5–10.1)
CHLORIDE SERPL-SCNC: 108 MMOL/L (ref 94–109)
CO2 SERPL-SCNC: 24 MMOL/L (ref 20–32)
CREAT SERPL-MCNC: 0.72 MG/DL (ref 0.66–1.25)
DIFFERENTIAL METHOD BLD: NORMAL
EOSINOPHIL # BLD AUTO: 0.5 10E9/L (ref 0–0.7)
EOSINOPHIL NFR BLD AUTO: 5 %
ERYTHROCYTE [DISTWIDTH] IN BLOOD BY AUTOMATED COUNT: 12.8 % (ref 10–15)
ETHANOL SERPL-MCNC: <0.01 G/DL
GFR SERPL CREATININE-BSD FRML MDRD: >90 ML/MIN/1.7M2
GLUCOSE SERPL-MCNC: 117 MG/DL (ref 70–99)
HCT VFR BLD AUTO: 42.3 % (ref 40–53)
HGB BLD-MCNC: 14.5 G/DL (ref 13.3–17.7)
LYMPHOCYTES # BLD AUTO: 4.3 10E9/L (ref 0.8–5.3)
LYMPHOCYTES NFR BLD AUTO: 43 %
MCH RBC QN AUTO: 29.4 PG (ref 26.5–33)
MCHC RBC AUTO-ENTMCNC: 34.3 G/DL (ref 31.5–36.5)
MCV RBC AUTO: 86 FL (ref 78–100)
MONOCYTES # BLD AUTO: 0.8 10E9/L (ref 0–1.3)
MONOCYTES NFR BLD AUTO: 8 %
NEUTROPHILS # BLD AUTO: 4.4 10E9/L (ref 1.6–8.3)
NEUTROPHILS NFR BLD AUTO: 44 %
PLATELET # BLD AUTO: 233 10E9/L (ref 150–450)
PLATELET # BLD EST: NORMAL 10*3/UL
POTASSIUM SERPL-SCNC: 3.6 MMOL/L (ref 3.4–5.3)
RBC # BLD AUTO: 4.93 10E12/L (ref 4.4–5.9)
RBC MORPH BLD: NORMAL
SODIUM SERPL-SCNC: 141 MMOL/L (ref 133–144)
WBC # BLD AUTO: 9.9 10E9/L (ref 4–11)

## 2018-12-12 PROCEDURE — 96361 HYDRATE IV INFUSION ADD-ON: CPT

## 2018-12-12 PROCEDURE — 25000128 H RX IP 250 OP 636: Performed by: EMERGENCY MEDICINE

## 2018-12-12 PROCEDURE — 80048 BASIC METABOLIC PNL TOTAL CA: CPT | Performed by: EMERGENCY MEDICINE

## 2018-12-12 PROCEDURE — 99285 EMERGENCY DEPT VISIT HI MDM: CPT | Mod: 25

## 2018-12-12 PROCEDURE — 96374 THER/PROPH/DIAG INJ IV PUSH: CPT

## 2018-12-12 PROCEDURE — 80320 DRUG SCREEN QUANTALCOHOLS: CPT | Performed by: EMERGENCY MEDICINE

## 2018-12-12 PROCEDURE — 25000132 ZZH RX MED GY IP 250 OP 250 PS 637: Performed by: EMERGENCY MEDICINE

## 2018-12-12 PROCEDURE — 85025 COMPLETE CBC W/AUTO DIFF WBC: CPT | Performed by: EMERGENCY MEDICINE

## 2018-12-12 PROCEDURE — 70450 CT HEAD/BRAIN W/O DYE: CPT

## 2018-12-12 PROCEDURE — 25000131 ZZH RX MED GY IP 250 OP 636 PS 637

## 2018-12-12 RX ORDER — MECLIZINE HCL 12.5 MG 12.5 MG/1
25 TABLET ORAL 3 TIMES DAILY PRN
Qty: 12 TABLET | Refills: 0 | Status: SHIPPED | OUTPATIENT
Start: 2018-12-12 | End: 2019-01-11

## 2018-12-12 RX ORDER — SODIUM CHLORIDE 9 MG/ML
1000 INJECTION, SOLUTION INTRAVENOUS CONTINUOUS
Status: DISCONTINUED | OUTPATIENT
Start: 2018-12-12 | End: 2018-12-12 | Stop reason: HOSPADM

## 2018-12-12 RX ORDER — ONDANSETRON 2 MG/ML
4 INJECTION INTRAMUSCULAR; INTRAVENOUS ONCE
Status: COMPLETED | OUTPATIENT
Start: 2018-12-12 | End: 2018-12-12

## 2018-12-12 RX ORDER — MECLIZINE HYDROCHLORIDE 25 MG/1
25 TABLET ORAL ONCE
Status: COMPLETED | OUTPATIENT
Start: 2018-12-12 | End: 2018-12-12

## 2018-12-12 RX ORDER — ONDANSETRON 4 MG/1
TABLET, ORALLY DISINTEGRATING ORAL
Status: COMPLETED
Start: 2018-12-12 | End: 2018-12-12

## 2018-12-12 RX ORDER — ONDANSETRON 4 MG/1
4 TABLET, ORALLY DISINTEGRATING ORAL EVERY 8 HOURS PRN
Qty: 12 TABLET | Refills: 0 | Status: SHIPPED | OUTPATIENT
Start: 2018-12-12 | End: 2018-12-15

## 2018-12-12 RX ORDER — ONDANSETRON 4 MG/1
4 TABLET, ORALLY DISINTEGRATING ORAL ONCE
Status: COMPLETED | OUTPATIENT
Start: 2018-12-12 | End: 2018-12-12

## 2018-12-12 RX ADMIN — MECLIZINE HYDROCHLORIDE 25 MG: 25 TABLET ORAL at 18:41

## 2018-12-12 RX ADMIN — ONDANSETRON 4 MG: 4 TABLET, ORALLY DISINTEGRATING ORAL at 16:15

## 2018-12-12 RX ADMIN — SODIUM CHLORIDE 1000 ML: 9 INJECTION, SOLUTION INTRAVENOUS at 16:41

## 2018-12-12 RX ADMIN — ONDANSETRON 4 MG: 2 INJECTION INTRAMUSCULAR; INTRAVENOUS at 16:44

## 2018-12-12 ASSESSMENT — ENCOUNTER SYMPTOMS
HEADACHES: 1
ABDOMINAL PAIN: 1
DIZZINESS: 1
VOMITING: 1
NAUSEA: 1

## 2018-12-12 ASSESSMENT — MIFFLIN-ST. JEOR: SCORE: 1899.68

## 2018-12-12 NOTE — ED PROVIDER NOTES
History     Chief Complaint:  Dizziness    HPI   Carlos A Connor is a 38 year old male with a history of substance abuse and anxiety who presents alone to the Emergency Department today for evaluation of dizziness which began this afternoon while driving home from work. The patient reports that the dizziness began first and was followed by nausea and multiple episodes of vomiting. He feels a cramping abdominal pain. His vision is blurry and he has a slight headache. He is fatigued and reports he has no energy. No chest pain. Patient reports he felt normal this morning and until he was driving this afternoon. The patient reports he lives with his brother, who is not sick, at home. 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 He does not recall eating any food which tasted bad. No recent change in medication.     PE/DVT Risk Factors:  The patient denies a personal or family history of PE, DVT, or other clotting disorders. The patient denies any recent travel, surgery, or other prolonged immobilization. The patient denies tobacco use or hormone use.    Allergies:  Droperidol     Medications:    Ativan  Paxil     Past Medical History:    Substance abuse  Methamphetamine abuse  Cocaine abuse  Migraine  Overweight  Panic attack  Anxiety  Chest pain    Past Surgical History:    History reviewed. No pertinent surgical history.    Family History:    History reviewed. No pertinent family history.     Social History:  Smoking status: Former smoker quit date 6/24/2008  Alcohol use: Yes  Marital Status:  Single [1]     Review of Systems   Eyes: Positive for visual disturbance.   Gastrointestinal: Positive for abdominal pain, nausea and vomiting.   Neurological: Positive for dizziness and headaches.   All other systems reviewed and are negative.    Physical Exam     Patient Vitals for the past 24 hrs:   BP Temp Temp src Pulse Heart Rate Resp SpO2 Height Weight   12/12/18 1830 (!) 139/92 -- -- -- 72 17 -- -- --   12/12/18  "1815 (!) 142/96 -- -- -- 76 17 -- -- --   12/12/18 1800 (!) 144/92 -- -- -- 73 17 -- -- --   12/12/18 1745 (!) 145/99 -- -- -- 73 20 -- -- --   12/12/18 1730 136/81 -- -- -- 74 13 -- -- --   12/12/18 1610 (!) 162/105 97.8  F (36.6  C) Temporal 72 -- 20 97 % 1.803 m (5' 11\") 95.3 kg (210 lb)       Physical Exam    Physical Exam   General:  Sitting on bed   HENT:  No obvious trauma to head  Right Ear:  External ear normal.   Left Ear:  External ear normal.   Nose:  Nose normal.   Eyes:  Conjunctivae and EOM are normal. Pupils are equal, round, and reactive.   Neck: Normal range of motion. Neck supple. No tracheal deviation present.   CV:  Normal heart sounds. No murmur heard.  Pulm/Chest: Effort normal and breath sounds normal.   Abd: Soft. No distension. There is no tenderness. There is no rigidity, no rebound and no guarding.   M/S: Normal range of motion.   Neuro: Alert. GCS 15.CN II-XII Grossly intact, no pronator drift, normal finger-nose-finger, visual fields intact by confrontation. Muscle strength is +5 proximal and distal in the bilateral upper and lower extremities. No dysarthria. Normal palm up, palm down. Horizontal nystagmus to the left.  Skin: Skin is warm and dry. No rash noted. Not diaphoretic.   Psych: Normal mood and affect. Behavior is normal.     Emergency Department Course     Imaging:  Radiographic findings were communicated with the patient who voiced understanding of the findings.  Head CT w/o contrast  IMPRESSION: Normal head CT. As read by radiology.    Laboratory:  CBC: WNL (WBC 9.9, HGB 14.5, )  BMP: Glucose 117 (H), o/w WNL (Creatinine 0.72)    Alcohol ethyl: <0.01    Interventions:  1615: Zofran 4 MG PO  1641: NS 1L IV Bolus  1644: Zofran 4 MG IV  1841: Antivert 25 MG PO    Emergency Department Course:  Past medical records, nursing notes, and vitals reviewed.  1628: I performed an exam of the patient and obtained history, as documented above.    IV inserted and blood drawn.    The " patient was sent for a head CT while in the emergency department, findings above.    1850: I rechecked the patient. Explained findings to the patient.    Findings and plan explained to the Patient. Patient discharged home with instructions regarding supportive care, medications, and reasons to return. The importance of close follow-up was reviewed.      Impression & Plan      Medical Decision Making:  Carlos A Connor is a very pleasant 38 year old male who presents with a feeling of dizziness, nausea, and vomiting.  I considered a broad differential including cardiac arrhythmia, ACS, aortic stenosis, HOCM, PE, orthostatic hypotension, anemia.  There is no murmur on exam making AS and HOCM unlikely. CT head was performed with no acute etiologies and a normal result.  Since a CT scan was performed within 6 hours of onset of symptoms, subarachnoid hemorrhage not seen by CT is unlikely.  His headache is only been mild and is now completely resolved.  I considered performing a lumbar puncture, but this is likely of low utility since he is back to his baseline. He is otherwise low risk for PE so I did not pursue this diagnosis further given the risks of CT contrast administration.  There are no malignant arrhthymias on EKG or during the patient's time on the monitor.  The patient does not appear clinically dehydrated and I would not expect this based on history and is not anemic.  Electrolytes are otherwise normal.  There are no signs of a concerning etiology for near syncope at this point.  He has no chest pain concerning for CAD, no seizure activity or post-ictal period, no murmur, and no signs of orthostasis in the ED (by definition, although his HR increased by 19 beats), and no focal neurologic symptoms.  The patient has a benign abdominal exam and no abdominal pain.  I doubt cholecystitis, appendicitis, small bowel obstruction, diverticulitis, etc.  The patient may be suffering from gastroenteritis.  He has had  no diarrhea yet.  He felt significant better after the fluids.  He also felt better after the meclizine.  I considered this could be vertigo.  He is young and this is less likely, but still a possibility as he did have a horizontal nystagmus.  He has no evidence of dysdiadochokinesis and cerebellar infarct is unlikely.  Prescribed Zofran and meclizine.  The workup in the ED is negative and the physical exam is re-assurring.  Supportive outpatient management is therefore indicated.  I recommend he not going to work until better in case this does represent some food borne illness or gastroenteritis.    The treatment plan was discussed with the patient and they expressed understanding of this plan and consented to the plan.  In addition, the patient will return to the emergency department if their symptoms persist, worsen, if new symptoms arise or if there is any concern as other pathology may be present that is not evident at this time. They also understand the importance of close follow up in the clinic and if unable to do so will return to the emergency department for a reevaluation. All questions were answered.    Diagnosis:    ICD-10-CM   1. Dizziness R42   2. Nausea and vomiting, intractability of vomiting not specified, unspecified vomiting type R11.2     Disposition:  discharged to home    Discharge Medications:     Medication List      Started    meclizine 12.5 MG tablet  Commonly known as:  ANTIVERT  25 mg, Oral, 3 TIMES DAILY PRN     ondansetron 4 MG ODT tab  Commonly known as:  ZOFRAN ODT  4 mg, Oral, EVERY 8 HOURS PRN          Norma Hill  12/12/2018    EMERGENCY DEPARTMENT  Scribe Disclosure:  I, Norma Hill, am serving as a scribe at 4:28 PM on 12/12/2018 to document services personally performed by Olayinka Li DO based on my observations and the provider's statements to me.        Olayinka Li DO  12/12/18 1931

## 2018-12-12 NOTE — LETTER
December 12, 2018      To Whom It May Concern:      Carlos A Connor was seen in our Emergency Department today, 12/12/18.  I expect his condition to improve over the next 2 days.  He may return to work/school when improved.    Sincerely,        Nakul TENORIO RN

## 2018-12-12 NOTE — ED AVS SNAPSHOT
"     EMERGENCY DEPARTMENT: [phone]                                              INTERAGENCY TRANSFER FORM - LAB / IMAGING / EKG / EMG RESULTS   2018                   Hospital Admission Date: 2018  WM DYER   : 1980  Sex: Male         Attending Provider:  Olayinka Li DO    Allergies:  Droperidol    Infection:  None   Service:  EMERGENCY ME    Ht:  1.803 m (5' 11\")   Wt:  95.3 kg (210 lb)   Admission Wt:  95.3 kg (210 lb)    BMI:  29.29 kg/m 2   BSA:  2.18 m 2            Patient PCP Information     Provider PCP Type    Joel Daniel Wegener, MD General         Lab Results - 3 Days      CBC with platelets differential [[id number]]  Resulted: 18 1853, Result status: Final result   Ordering provider:  Olayinka Li DO  18 1638 Resulting lab:  [facility]    Specimen Information    Type Source Collected On   Blood   18 1640          Components    Component Value Reference Range Flag Lab   WBC 9.9 4.0 - 11.0 10e9/L   FrStHsLb   RBC Count 4.93 4.4 - 5.9 10e12/L   FrStHsLb   Hemoglobin 14.5 13.3 - 17.7 g/dL   FrStHsLb   Hematocrit 42.3 40.0 - 53.0 %   FrStHsLb   MCV 86 78 - 100 fl   FrStHsLb   MCH 29.4 26.5 - 33.0 pg   FrStHsLb   MCHC 34.3 31.5 - 36.5 g/dL   FrStHsLb   RDW 12.8 10.0 - 15.0 %   FrStHsLb   Platelet Count 233 150 - 450 10e9/L   FrStHsLb   Diff Method Manual Differential     FrStHsLb   % Neutrophils 44.0 %   FrStHsLb   % Lymphocytes 43.0 %   FrStHsLb   % Monocytes 8.0 %   FrStHsLb   % Eosinophils 5.0 %   FrStHsLb   % Basophils 0.0 %   FrStHsLb   Absolute Neutrophil 4.4 1.6 - 8.3 10e9/L   FrStHsLb   Absolute Lymphocytes 4.3 0.8 - 5.3 10e9/L   FrStHsLb   Absolute Monocytes 0.8 0.0 - 1.3 10e9/L   FrStHsLb   Absolute Eosinophils 0.5 0.0 - 0.7 10e9/L   FrStHsLb   Absolute Basophils 0.0 0.0 - 0.2 10e9/L   FrStHsLb   RBC Morphology Consistent with reported results     FrStHsLb   Platelet Estimate Automated count confirmed.  " Platelet morphology is normal.     FrStHsLb            Basic metabolic panel [[id number]] (Abnormal)  Resulted: 12/12/18 1842, Result status: Final result   Ordering provider:  Olayinka Li, DO  12/12/18 1638 Resulting lab:  [facility]    Specimen Information    Type Source Collected On   Blood   12/12/18 1640          Components    Component Value Reference Range Flag Lab   Sodium 141 133 - 144 mmol/L   FrStHsLb   Potassium 3.6 3.4 - 5.3 mmol/L   FrStHsLb   Chloride 108 94 - 109 mmol/L   FrStHsLb   Carbon Dioxide 24 20 - 32 mmol/L   FrStHsLb   Anion Gap 9 3 - 14 mmol/L   FrStHsLb   Glucose 117 70 - 99 mg/dL H FrStHsLb   Urea Nitrogen 17 7 - 30 mg/dL   FrStHsLb   Creatinine 0.72 0.66 - 1.25 mg/dL   FrStHsLb   GFR Estimate >90 >60 mL/min/1.7m2   FrStHsLb   Comment:  Non  GFR Calc   GFR Estimate If Black >90 >60 mL/min/1.7m2   FrStHsLb   Comment:  African American GFR Calc   Calcium 9.0 8.5 - 10.1 mg/dL   FrStHsLb            Alcohol ethyl [[id number]]  Resulted: 12/12/18 1842, Result status: Final result   Ordering provider:  Olayinka Li,   12/12/18 1638 Resulting lab:  [facility]    Specimen Information    Type Source Collected On   Blood   12/12/18 1640          Components    Component Value Reference Range Flag Lab   Ethanol g/dL <0.01 <0.01 g/dL   FrStHsLb            Testing Performed By     Lab - Abbreviation Name Director Address Valid Date Range    14 - FrStHsLb [facility] Unknown [address] [place] [address] 05/08/15 1057 - Present            Unresulted Labs (24h ago, onward)    None         Imaging Results - 3 Days      Head CT w/o contrast [[id number]]  Resulted: 12/12/18 1733, Result status: Preliminary result   Ordering provider:  Olayinka Li,   12/12/18 1638 Resulted by:  Doyle Pro MD   Performed:  12/12/18 1711 - 12/12/18 1721 Accession number:  [id number]   Resulting lab:  RADIOLOGY RESULTS    Narrative:  CT OF THE HEAD WITHOUT CONTRAST 12/12/2018 5:21 PM     COMPARISON: Head CT 8/12/2017    HISTORY: Subarachnoid hemorrhage suspected, initial exam. Dizziness,  headache, hypertension. Symptom onset < one hour ago. Nausea/vomiting.      TECHNIQUE: Axial CT images of the head from the skull base to the  vertex were acquired without IV contrast.    FINDINGS: The ventricles and basal cisterns are within normal limits  in configuration. There is no midline shift. There are no extra-axial  fluid collections. Gray-white differentiation is well maintained.    No intracranial hemorrhage, mass or recent infarct.    The visualized paranasal sinuses are well-aerated. There is no  mastoiditis. There are no fractures of the visualized bones.     Impression:  IMPRESSION: Normal head CT.      Radiation dose for this scan was reduced using automated exposure  control, adjustment of the mA and/or kV according to patient size, or  iterative reconstruction technique      Testing Performed By     Lab - Abbreviation Name Director Address Valid Date Range    104 - Rad Rslts RADIOLOGY RESULTS Unknown Unknown 02/16/05 1553 - Present            Encounter-Level Documents:    There are no encounter-level documents.     Order-Level Documents:    There are no order-level documents.

## 2018-12-12 NOTE — ED AVS SNAPSHOT
Emergency Department  [address] [facility] [address]  Phone:  [phone]  Fax:  [phone]                                    Carlos A Connor   MRN: [id number]    Department:   Emergency Department   Date of Visit:  12/12/2018           After Visit Summary Signature Page    I have received my discharge instructions, and my questions have been answered. I have discussed any challenges I see with this plan with the nurse or doctor.    ..........................................................................................................................................  Patient/Patient Representative Signature      ..........................................................................................................................................  Patient Representative Print Name and Relationship to Patient    ..................................................               ................................................  Date                                   Time    ..........................................................................................................................................  Reviewed by Signature/Title    ...................................................              ..............................................  Date                                               Time          22EPIC Rev 08/18

## 2019-06-17 ENCOUNTER — HOSPITAL ENCOUNTER (EMERGENCY)
Facility: CLINIC | Age: 39
Discharge: HOME | End: 2019-06-17
Attending: EMERGENCY MEDICINE | Admitting: EMERGENCY MEDICINE
Payer: COMMERCIAL

## 2019-06-17 VITALS
DIASTOLIC BLOOD PRESSURE: 104 MMHG | OXYGEN SATURATION: 94 % | WEIGHT: 214 LBS | BODY MASS INDEX: 29.85 KG/M2 | SYSTOLIC BLOOD PRESSURE: 165 MMHG | TEMPERATURE: 98.2 F | HEART RATE: 114 BPM

## 2019-06-17 DIAGNOSIS — R00.2 PALPITATIONS: ICD-10-CM

## 2019-06-17 DIAGNOSIS — F41.9 ANXIETY: ICD-10-CM

## 2019-06-17 DIAGNOSIS — Z78.9 ALCOHOL USE: ICD-10-CM

## 2019-06-17 LAB
ALCOHOL BREATH TEST: 0.05 (ref 0–0.01)
ANION GAP SERPL CALCULATED.3IONS-SCNC: 10 MMOL/L (ref 3–14)
BUN SERPL-MCNC: 10 MG/DL (ref 7–30)
CALCIUM SERPL-MCNC: 8.7 MG/DL (ref 8.5–10.1)
CHLORIDE SERPL-SCNC: 105 MMOL/L (ref 94–109)
CO2 SERPL-SCNC: 26 MMOL/L (ref 20–32)
CREAT SERPL-MCNC: 0.63 MG/DL (ref 0.66–1.25)
ETHANOL SERPL-MCNC: 0.06 G/DL
GFR SERPL CREATININE-BSD FRML MDRD: >90 ML/MIN/{1.73_M2}
GLUCOSE SERPL-MCNC: 107 MG/DL (ref 70–99)
INTERPRETATION ECG - MUSE: NORMAL
POTASSIUM SERPL-SCNC: 3.6 MMOL/L (ref 3.4–5.3)
SODIUM SERPL-SCNC: 141 MMOL/L (ref 133–144)

## 2019-06-17 PROCEDURE — 93005 ELECTROCARDIOGRAM TRACING: CPT

## 2019-06-17 PROCEDURE — 80048 BASIC METABOLIC PNL TOTAL CA: CPT | Performed by: EMERGENCY MEDICINE

## 2019-06-17 PROCEDURE — 96360 HYDRATION IV INFUSION INIT: CPT

## 2019-06-17 PROCEDURE — 80320 DRUG SCREEN QUANTALCOHOLS: CPT | Performed by: EMERGENCY MEDICINE

## 2019-06-17 PROCEDURE — 25000132 ZZH RX MED GY IP 250 OP 250 PS 637: Performed by: EMERGENCY MEDICINE

## 2019-06-17 PROCEDURE — 25000128 H RX IP 250 OP 636: Performed by: EMERGENCY MEDICINE

## 2019-06-17 PROCEDURE — 99284 EMERGENCY DEPT VISIT MOD MDM: CPT | Mod: 25

## 2019-06-17 RX ORDER — LORAZEPAM 1 MG/1
1 TABLET ORAL ONCE
Status: COMPLETED | OUTPATIENT
Start: 2019-06-17 | End: 2019-06-17

## 2019-06-17 RX ORDER — SODIUM CHLORIDE 9 MG/ML
1000 INJECTION, SOLUTION INTRAVENOUS CONTINUOUS
Status: DISCONTINUED | OUTPATIENT
Start: 2019-06-17 | End: 2019-06-17 | Stop reason: HOSPADM

## 2019-06-17 RX ADMIN — LORAZEPAM 1 MG: 1 TABLET ORAL at 04:49

## 2019-06-17 RX ADMIN — SODIUM CHLORIDE 1000 ML: 9 INJECTION, SOLUTION INTRAVENOUS at 04:43

## 2019-06-17 ASSESSMENT — ENCOUNTER SYMPTOMS
PALPITATIONS: 1
DIZZINESS: 1

## 2019-06-17 NOTE — ED PROVIDER NOTES
"  History     Chief Complaint:  \"I don't feel right, I'm anxious\"      HPI   Carlos A Connor is a 38 year old male who presents with malaise and palpitations as well as feeling \"anxious\" after going out and drinking alcohol with friends earlier this evening. The patient reports that he was drinking with friends last night and apparently had too much. He states that he rarely drinks, says only one beer a month. When he got home early this morning he says he began to feel dizzy and had palpitations in his chest. After his symptoms persisted for some time the patient took a taxi from home to the ED since he was still intoxicated. When asked about recent cocaine and meth use he says that he does not remember, and admits to a remote history of using these stimulants has no recollection of using them lately.  He feels anxious.  He continues to feel palpitations here in the ED.  No history of arrhythmia.  He has been to this ED multiple times in the past for similar presentations, attributed primarily to his drug use and anxiety.  No syncope.  No pain. Patient takes Lorazepam for anxiety prescribed by his physician in the [facility] system.      Allergies:  Droperidol     Medications:    Ativan  Paxil    Past Medical History:    Substance abuse   Methamphetamine abuse  Cocaine abuse  Migraines   Anxiety   Pericarditis     Past Surgical History:    History reviewed. No pertinent surgical history.    Family History:    History reviewed. No pertinent family history.     Social History:  Smoking status: Former smoker  Alcohol use: yes  Drug use: Former Methamphetamine and cocaine use  Patient works as a .          Review of Systems   Cardiovascular: Positive for palpitations.   Neurological: Positive for dizziness.   All other systems reviewed and are negative.      Physical Exam     Patient Vitals for the past 24 hrs:   BP Temp Temp src Pulse Heart Rate SpO2 Weight   06/17/19 0500 -- -- -- -- 88 94 % --   06/17/19 0352 " (!) 165/104 98.2  F (36.8  C) Temporal 114 -- 98 % 97.1 kg (214 lb)         Physical Exam  General: Male sitting upright in room 3  HENT: mucous membranes moist, thyroid nonpalpable  CV: Initially slightly tachycardic rate, regular rhythm, no lower extremity edema, no JVD, palpable symmetric radial pulses, no murmur audible  Resp: clear throughout, normal effort, no crackles or wheezing  GI: abdomen soft and nontender  MSK: no bony tenderness   Skin: appropriately warm and dry  Neuro: alert, clear speech, oriented, face symmetric,  equal, ambulatory with steady gait, responds briskly and appropriately to all questions and commands  Psych: Moderately anxious, cooperative      Emergency Department Course   ECG (03:50:17):  Rate 104 bpm. NC interval 140. QRS duration 86. QT/QTc 270/355. P-R-T axes 54 39 15. Sinus tachycardia, Possible left atrial enlargement, Nonspecific T wave abnormality, Abnormal ECG,  Interpreted at 0406 by Doyle Orozco MD.      Laboratory:  Alcohol ethyl: 0.06  Alcohol breath test POCT: 0.052  CMP:  Glucose 107, Creatinine 0.63    Interventions:  0449: Ativan 1 mg PO  0521: NaCl bolus 633 ml IV     Emergency Department Course:  Past medical records, nursing notes, and vitals reviewed.  0405: I performed an exam of the patient and obtained history, as documented above.    I performed electronic chart review in [employer].  The patient was placed on continuous cardiac and pulse ox monitoring.    IV inserted and blood drawn.    0517: I rechecked the patient.  Findings and plan explained to the Patient. Patient discharged home with instructions regarding supportive care, medications, and reasons to return. The importance of close follow-up was reviewed.        Impression & Plan      Medical Decision Making:  I think his presentation is primarily due to to alcohol use triggering his underlying anxiety.  He reports palpitations doing with ongoing symptoms he does not demonstrate any arrhythmia  here, though I certainly considered paroxysmal atrial fibrillation among a variety of other potentially dangerous arrhythmias and causes of his symptoms.  No evidence of metabolic derangement.  Do not suspect infection, myocardial infarction, pulmonary embolism, or other dangerous cause.  He was treated symptomatically and improved with time.  He was thereafter very comfortable with the plan for discharge home.  Counseled him to moderate his alcohol use and refrain from any other substances.  He would benefit from outpatient follow-up for longitudinal care in the near future and was also welcome back for acute worsening.      Diagnosis:    ICD-10-CM    1. Palpitations R00.2    2. Alcohol use Z78.9    3. Anxiety F41.9        Disposition:  discharged to home      Sheng Leiva  6/17/2019    EMERGENCY DEPARTMENT    Scribe Disclosure:  I, Sheng Leiva, am serving as a scribe at 4:05 AM on 6/17/2019 to document services personally performed by Doyle Orozco MD based on my observations and the provider's statements to me.     This record was created at least in part using electronic voice recognition software, so please excuse any typographical errors      Doyle Orozco MD  06/17/19 2051       Doyle Orozco MD  06/17/19 2051

## 2019-06-17 NOTE — ED TRIAGE NOTES
"\"I drank a lot last night, and honestly I can't tell you how much, but my heart feels really excited right now and my heart is pounding.  I feel really funny\".  Last drink at 0130.    "

## 2019-06-17 NOTE — ED AVS SNAPSHOT
Emergency Department  [address] [facility] [address]  Phone:  [phone]  Fax:  [phone]                                    Carlos A Connor   MRN: [id number]    Department:   Emergency Department   Date of Visit:  6/17/2019           After Visit Summary Signature Page    I have received my discharge instructions, and my questions have been answered. I have discussed any challenges I see with this plan with the nurse or doctor.    ..........................................................................................................................................  Patient/Patient Representative Signature      ..........................................................................................................................................  Patient Representative Print Name and Relationship to Patient    ..................................................               ................................................  Date                                   Time    ..........................................................................................................................................  Reviewed by Signature/Title    ...................................................              ..............................................  Date                                               Time          22EPIC Rev 08/18

## 2019-07-20 ENCOUNTER — APPOINTMENT (OUTPATIENT)
Dept: GENERAL RADIOLOGY | Facility: CLINIC | Age: 39
End: 2019-07-20
Attending: EMERGENCY MEDICINE
Payer: COMMERCIAL

## 2019-07-20 ENCOUNTER — HOSPITAL ENCOUNTER (EMERGENCY)
Facility: CLINIC | Age: 39
Discharge: HOME | End: 2019-07-20
Attending: EMERGENCY MEDICINE | Admitting: EMERGENCY MEDICINE
Payer: COMMERCIAL

## 2019-07-20 VITALS
BODY MASS INDEX: 29.85 KG/M2 | RESPIRATION RATE: 16 BRPM | OXYGEN SATURATION: 97 % | SYSTOLIC BLOOD PRESSURE: 149 MMHG | HEIGHT: 71 IN | DIASTOLIC BLOOD PRESSURE: 106 MMHG | TEMPERATURE: 97.7 F | HEART RATE: 92 BPM

## 2019-07-20 DIAGNOSIS — F19.90 DRUG USE: ICD-10-CM

## 2019-07-20 DIAGNOSIS — R07.9 CHEST PAIN, UNSPECIFIED TYPE: ICD-10-CM

## 2019-07-20 LAB
ALBUMIN SERPL-MCNC: 4.3 G/DL (ref 3.4–5)
ALP SERPL-CCNC: 92 U/L (ref 40–150)
ALT SERPL W P-5'-P-CCNC: 58 U/L (ref 0–70)
ANION GAP SERPL CALCULATED.3IONS-SCNC: 6 MMOL/L (ref 3–14)
AST SERPL W P-5'-P-CCNC: 18 U/L (ref 0–45)
BASOPHILS # BLD AUTO: 0 10E9/L (ref 0–0.2)
BASOPHILS NFR BLD AUTO: 0.4 %
BILIRUB SERPL-MCNC: 1 MG/DL (ref 0.2–1.3)
BUN SERPL-MCNC: 8 MG/DL (ref 7–30)
CALCIUM SERPL-MCNC: 9 MG/DL (ref 8.5–10.1)
CHLORIDE SERPL-SCNC: 107 MMOL/L (ref 94–109)
CO2 SERPL-SCNC: 27 MMOL/L (ref 20–32)
CREAT SERPL-MCNC: 0.6 MG/DL (ref 0.66–1.25)
DIFFERENTIAL METHOD BLD: NORMAL
EOSINOPHIL # BLD AUTO: 0.1 10E9/L (ref 0–0.7)
EOSINOPHIL NFR BLD AUTO: 0.9 %
ERYTHROCYTE [DISTWIDTH] IN BLOOD BY AUTOMATED COUNT: 13.2 % (ref 10–15)
ETHANOL SERPL-MCNC: <0.01 G/DL
GFR SERPL CREATININE-BSD FRML MDRD: >90 ML/MIN/{1.73_M2}
GLUCOSE SERPL-MCNC: 116 MG/DL (ref 70–99)
HCT VFR BLD AUTO: 44.7 % (ref 40–53)
HGB BLD-MCNC: 15.4 G/DL (ref 13.3–17.7)
IMM GRANULOCYTES # BLD: 0 10E9/L (ref 0–0.4)
IMM GRANULOCYTES NFR BLD: 0.4 %
LIPASE SERPL-CCNC: 163 U/L (ref 73–393)
LYMPHOCYTES # BLD AUTO: 2.7 10E9/L (ref 0.8–5.3)
LYMPHOCYTES NFR BLD AUTO: 34.2 %
MCH RBC QN AUTO: 29.5 PG (ref 26.5–33)
MCHC RBC AUTO-ENTMCNC: 34.5 G/DL (ref 31.5–36.5)
MCV RBC AUTO: 86 FL (ref 78–100)
MONOCYTES # BLD AUTO: 0.7 10E9/L (ref 0–1.3)
MONOCYTES NFR BLD AUTO: 9.4 %
NEUTROPHILS # BLD AUTO: 4.3 10E9/L (ref 1.6–8.3)
NEUTROPHILS NFR BLD AUTO: 54.7 %
NRBC # BLD AUTO: 0 10*3/UL
NRBC BLD AUTO-RTO: 0 /100
PLATELET # BLD AUTO: 211 10E9/L (ref 150–450)
POTASSIUM SERPL-SCNC: 3.5 MMOL/L (ref 3.4–5.3)
PROT SERPL-MCNC: 8.3 G/DL (ref 6.8–8.8)
RBC # BLD AUTO: 5.22 10E12/L (ref 4.4–5.9)
SODIUM SERPL-SCNC: 140 MMOL/L (ref 133–144)
TROPONIN I SERPL-MCNC: <0.015 UG/L (ref 0–0.04)
WBC # BLD AUTO: 7.8 10E9/L (ref 4–11)

## 2019-07-20 PROCEDURE — 80053 COMPREHEN METABOLIC PANEL: CPT | Performed by: EMERGENCY MEDICINE

## 2019-07-20 PROCEDURE — 71046 X-RAY EXAM CHEST 2 VIEWS: CPT

## 2019-07-20 PROCEDURE — 25000128 H RX IP 250 OP 636: Performed by: EMERGENCY MEDICINE

## 2019-07-20 PROCEDURE — 84484 ASSAY OF TROPONIN QUANT: CPT | Performed by: EMERGENCY MEDICINE

## 2019-07-20 PROCEDURE — 25000132 ZZH RX MED GY IP 250 OP 250 PS 637: Performed by: EMERGENCY MEDICINE

## 2019-07-20 PROCEDURE — 96361 HYDRATE IV INFUSION ADD-ON: CPT

## 2019-07-20 PROCEDURE — 99285 EMERGENCY DEPT VISIT HI MDM: CPT | Mod: 25

## 2019-07-20 PROCEDURE — 96374 THER/PROPH/DIAG INJ IV PUSH: CPT

## 2019-07-20 PROCEDURE — 83690 ASSAY OF LIPASE: CPT | Performed by: EMERGENCY MEDICINE

## 2019-07-20 PROCEDURE — 80320 DRUG SCREEN QUANTALCOHOLS: CPT | Performed by: EMERGENCY MEDICINE

## 2019-07-20 PROCEDURE — 93005 ELECTROCARDIOGRAM TRACING: CPT

## 2019-07-20 PROCEDURE — 85025 COMPLETE CBC W/AUTO DIFF WBC: CPT | Performed by: EMERGENCY MEDICINE

## 2019-07-20 RX ORDER — KETOROLAC TROMETHAMINE 15 MG/ML
15 INJECTION, SOLUTION INTRAMUSCULAR; INTRAVENOUS ONCE
Status: COMPLETED | OUTPATIENT
Start: 2019-07-20 | End: 2019-07-20

## 2019-07-20 RX ORDER — LORAZEPAM 0.5 MG/1
1 TABLET ORAL ONCE
Status: COMPLETED | OUTPATIENT
Start: 2019-07-20 | End: 2019-07-20

## 2019-07-20 RX ADMIN — KETOROLAC TROMETHAMINE 15 MG: 15 INJECTION, SOLUTION INTRAMUSCULAR; INTRAVENOUS at 13:36

## 2019-07-20 RX ADMIN — LORAZEPAM 1 MG: 0.5 TABLET ORAL at 13:36

## 2019-07-20 RX ADMIN — SODIUM CHLORIDE 1000 ML: 9 INJECTION, SOLUTION INTRAVENOUS at 13:36

## 2019-07-20 ASSESSMENT — ENCOUNTER SYMPTOMS
NERVOUS/ANXIOUS: 1
NAUSEA: 1
SHORTNESS OF BREATH: 0
FEVER: 0
DIZZINESS: 1
HEADACHES: 0
VOMITING: 0
ABDOMINAL PAIN: 1
COUGH: 1
BACK PAIN: 0

## 2019-07-20 NOTE — ED PROVIDER NOTES
"  History     Chief Complaint:  Chest Pain    HPI   Carlos A Connor is a 38 year old male with a history of substance abuse and anxiety who presents via taxi for evaluation of chest pain. Last night, he states he was out with friends and drinking some beers. He also states a friend offered him a small amount of white powder which he snorted; he believes this was methamphetamines. When he got home and tried to fall asleep, he reports the onset of a \"pumping\" chest pain that prevented him from sleeping. This morning, he reports the chest pain persisted and was associated with dizziness, thus prompting his presentation today. Here, he reports the pain radiates into his abdomen, but nowhere else. He also notes nausea, a cough, and generalized weakness. He denies any vomiting, shortness of breath, fevers, or other localized pains. He has no prior history of blood clots or heart disease.  He also denies any recent travel. Of note, he took an Ativan prior to arrival because he was feeling anxious and, here, he notes continued anxiety.     Allergies:  Droperidol    Medications:    Ativan  Paxil    Past Medical History:    Substance abuse (Cocaine & Methamphetamine)  Anxiety  Migraines    Past Surgical History:    The patient does not have any pertinent past surgical history.    Family History:    No past pertinent family history.    Social History:  Marital Status:  Single [1]  Former smoker.   Positive for alcohol use. Comment: sometimes.   Negative for current drug use. Comment: History of abuse.      Review of Systems   Constitutional: Negative for fever.   Respiratory: Positive for cough. Negative for shortness of breath.    Cardiovascular: Negative for chest pain.   Gastrointestinal: Positive for abdominal pain and nausea. Negative for vomiting.   Musculoskeletal: Negative for back pain.   Neurological: Positive for dizziness. Negative for headaches.   Psychiatric/Behavioral: The patient is nervous/anxious.    All " "other systems reviewed and are negative.    Physical Exam     Patient Vitals for the past 24 hrs:   BP Temp Temp src Pulse Heart Rate Resp SpO2 Height   07/20/19 1400 -- -- -- -- 104 28 96 % --   07/20/19 1357 -- -- -- -- 96 11 97 % --   07/20/19 1301 (!) 169/102 97.7  F (36.5  C) Oral 115 -- -- 97 % 1.803 m (5' 11\")      Physical Exam  General: The patient appears uncomfortable  Head:  The scalp, face, and head appear normal  Eyes:  The pupils are equal and round    Conjunctivae and sclerae are normal  ENT:    Moist mucous membranes  Neck:  Normal range of motion  CV:  Regular rate and underlying rhythm   Resp:  Lungs are clear    Non-labored breathing    No rales    No wheezing   GI:  Abdomen is soft, there is no rigidity    No distension    No rebound tenderness     No abdominal tenderness  MS:  Normal muscular tone  Skin:  No rash or acute skin lesions noted.    Neuro: Speech is normal and fluent    Awake and alert    Face symmetric    Gait stable    Emergency Department Course   ECG:  Indication: Chest Pain  Time: 1306  Vent. Rate 117 bpm. AK interval 140. QRS duration 88. QT/QTc 334/465. P-R-T axis 62 57 32.    Sinus tachycardia  Nonspecific T wave abnormality  Abnormal ECG. No significant change compared to EKG dated 6/17/19. Read time: 1306.    Imaging:  Radiographic findings were communicated with the patient who voiced understanding of the findings.  XR Chest 2 views:   PA and lateral views of the chest. Lungs are clear. Heart  is normal in size. No effusions are evident. No pneumothorax, as per radiology.     Laboratory:  CBC: WBC: 7.8, HGB: 15.4, PLT: 211  CMP: Glucose 116 (H), o/w WNL (Creatinine: 0.60 (L))  Lipase: 163  1320 Troponin: <0.015    Alcohol level: <0.01    Interventions:  1336 NS 1L IV   Toradol, 15 mg, IV injection   Ativan, 1 mg, PO    Emergency Department Course:  Nursing notes and vitals reviewed.     EKG obtained in the ED, see results above.     1325: I performed an exam of the " patient as documented above.     IV was inserted and blood was drawn for laboratory testing, results above.    Medicine administered as documented above.     The patient was sent for a chest x-ray while in the emergency department, results above.     1523: I rechecked the patient and discussed the results of his workup thus far. He reports his pain is gone.      I personally reviewed the laboratory and imaging results with the Patient and answered all related questions prior to discharge.      Impression & Plan      Medical Decision Making:  Carlos A Connor is a 38 year old male who presented to the ED with chest pain. EKG with sinus tachycardia. Pain started after using drugs last night. Troponin negative. Doubt PE and don't think additional for this is indicated. Doubt aortic dissection. Pain resolved in ED and feeling better. Unlikely ACS so don't think stress test as outpatient is indicated. No abdominal tenderness on exam to suggest intra-abdominal pathology. REcommended f/u with PCP.    Diagnosis:    ICD-10-CM    1. Chest pain, unspecified type R07.9    2. Drug use F19.90        Disposition:  discharged to home      Scribe Disclosure:  I, Norma Ortiz, am serving as a scribe on 7/20/2019 at 1:24 PM to personally document services performed by Basia Bravo MD based on my observations and the provider's statements to me.      Norma Ortiz  7/20/2019    EMERGENCY DEPARTMENT       Basia Bravo MD  07/20/19 1923

## 2019-07-20 NOTE — ED AVS SNAPSHOT
Emergency Department  [address] [facility] [address]  Phone:  [phone]  Fax:  [phone]                                    Carlos A Connor   MRN: [id number]    Department:   Emergency Department   Date of Visit:  7/20/2019           After Visit Summary Signature Page    I have received my discharge instructions, and my questions have been answered. I have discussed any challenges I see with this plan with the nurse or doctor.    ..........................................................................................................................................  Patient/Patient Representative Signature      ..........................................................................................................................................  Patient Representative Print Name and Relationship to Patient    ..................................................               ................................................  Date                                   Time    ..........................................................................................................................................  Reviewed by Signature/Title    ...................................................              ..............................................  Date                                               Time          22EPIC Rev 08/18

## 2019-09-16 ENCOUNTER — HOSPITAL ENCOUNTER (EMERGENCY)
Facility: CLINIC | Age: 39
Discharge: HOME | End: 2019-09-16
Attending: EMERGENCY MEDICINE | Admitting: EMERGENCY MEDICINE
Payer: COMMERCIAL

## 2019-09-16 VITALS
HEART RATE: 81 BPM | WEIGHT: 215 LBS | RESPIRATION RATE: 16 BRPM | BODY MASS INDEX: 30.1 KG/M2 | TEMPERATURE: 98.3 F | HEIGHT: 71 IN | OXYGEN SATURATION: 97 % | DIASTOLIC BLOOD PRESSURE: 83 MMHG | SYSTOLIC BLOOD PRESSURE: 137 MMHG

## 2019-09-16 DIAGNOSIS — K29.00 ACUTE GASTRITIS WITHOUT HEMORRHAGE, UNSPECIFIED GASTRITIS TYPE: ICD-10-CM

## 2019-09-16 LAB
ALBUMIN SERPL-MCNC: 4 G/DL (ref 3.4–5)
ALP SERPL-CCNC: 96 U/L (ref 40–150)
ALT SERPL W P-5'-P-CCNC: 41 U/L (ref 0–70)
ANION GAP SERPL CALCULATED.3IONS-SCNC: 6 MMOL/L (ref 3–14)
AST SERPL W P-5'-P-CCNC: 9 U/L (ref 0–45)
BASOPHILS # BLD AUTO: 0 10E9/L (ref 0–0.2)
BASOPHILS NFR BLD AUTO: 0.3 %
BILIRUB SERPL-MCNC: 0.8 MG/DL (ref 0.2–1.3)
BUN SERPL-MCNC: 10 MG/DL (ref 7–30)
CALCIUM SERPL-MCNC: 9.2 MG/DL (ref 8.5–10.1)
CHLORIDE SERPL-SCNC: 108 MMOL/L (ref 94–109)
CO2 SERPL-SCNC: 26 MMOL/L (ref 20–32)
CREAT SERPL-MCNC: 0.62 MG/DL (ref 0.66–1.25)
DIFFERENTIAL METHOD BLD: NORMAL
EOSINOPHIL # BLD AUTO: 0.3 10E9/L (ref 0–0.7)
EOSINOPHIL NFR BLD AUTO: 3.2 %
ERYTHROCYTE [DISTWIDTH] IN BLOOD BY AUTOMATED COUNT: 13.2 % (ref 10–15)
GFR SERPL CREATININE-BSD FRML MDRD: >90 ML/MIN/{1.73_M2}
GLUCOSE SERPL-MCNC: 101 MG/DL (ref 70–99)
HCT VFR BLD AUTO: 43.8 % (ref 40–53)
HGB BLD-MCNC: 15 G/DL (ref 13.3–17.7)
IMM GRANULOCYTES # BLD: 0 10E9/L (ref 0–0.4)
IMM GRANULOCYTES NFR BLD: 0.3 %
INTERPRETATION ECG - MUSE: NORMAL
LYMPHOCYTES # BLD AUTO: 3.5 10E9/L (ref 0.8–5.3)
LYMPHOCYTES NFR BLD AUTO: 38.1 %
MCH RBC QN AUTO: 29.7 PG (ref 26.5–33)
MCHC RBC AUTO-ENTMCNC: 34.2 G/DL (ref 31.5–36.5)
MCV RBC AUTO: 87 FL (ref 78–100)
MONOCYTES # BLD AUTO: 0.8 10E9/L (ref 0–1.3)
MONOCYTES NFR BLD AUTO: 8.2 %
NEUTROPHILS # BLD AUTO: 4.6 10E9/L (ref 1.6–8.3)
NEUTROPHILS NFR BLD AUTO: 49.9 %
NRBC # BLD AUTO: 0 10*3/UL
NRBC BLD AUTO-RTO: 0 /100
PLATELET # BLD AUTO: 238 10E9/L (ref 150–450)
POTASSIUM SERPL-SCNC: 4 MMOL/L (ref 3.4–5.3)
PROT SERPL-MCNC: 8.3 G/DL (ref 6.8–8.8)
RBC # BLD AUTO: 5.05 10E12/L (ref 4.4–5.9)
SODIUM SERPL-SCNC: 140 MMOL/L (ref 133–144)
TROPONIN I SERPL-MCNC: <0.015 UG/L (ref 0–0.04)
WBC # BLD AUTO: 9.1 10E9/L (ref 4–11)

## 2019-09-16 PROCEDURE — 25000132 ZZH RX MED GY IP 250 OP 250 PS 637: Performed by: EMERGENCY MEDICINE

## 2019-09-16 PROCEDURE — 96361 HYDRATE IV INFUSION ADD-ON: CPT

## 2019-09-16 PROCEDURE — 93005 ELECTROCARDIOGRAM TRACING: CPT

## 2019-09-16 PROCEDURE — 99284 EMERGENCY DEPT VISIT MOD MDM: CPT | Mod: 25

## 2019-09-16 PROCEDURE — 96360 HYDRATION IV INFUSION INIT: CPT

## 2019-09-16 PROCEDURE — 85025 COMPLETE CBC W/AUTO DIFF WBC: CPT | Performed by: EMERGENCY MEDICINE

## 2019-09-16 PROCEDURE — 84484 ASSAY OF TROPONIN QUANT: CPT | Performed by: EMERGENCY MEDICINE

## 2019-09-16 PROCEDURE — 80053 COMPREHEN METABOLIC PANEL: CPT | Performed by: EMERGENCY MEDICINE

## 2019-09-16 PROCEDURE — 25000128 H RX IP 250 OP 636: Performed by: EMERGENCY MEDICINE

## 2019-09-16 PROCEDURE — 25000125 ZZHC RX 250: Performed by: EMERGENCY MEDICINE

## 2019-09-16 RX ORDER — SODIUM CHLORIDE 9 MG/ML
INJECTION, SOLUTION INTRAVENOUS CONTINUOUS
Status: DISCONTINUED | OUTPATIENT
Start: 2019-09-16 | End: 2019-09-17 | Stop reason: HOSPADM

## 2019-09-16 RX ORDER — SODIUM CHLORIDE 9 MG/ML
1000 INJECTION, SOLUTION INTRAVENOUS CONTINUOUS
Status: DISCONTINUED | OUTPATIENT
Start: 2019-09-16 | End: 2019-09-17 | Stop reason: HOSPADM

## 2019-09-16 RX ADMIN — LIDOCAINE HYDROCHLORIDE 30 ML: 20 SOLUTION ORAL; TOPICAL at 21:57

## 2019-09-16 RX ADMIN — SODIUM CHLORIDE 1000 ML: 9 INJECTION, SOLUTION INTRAVENOUS at 20:43

## 2019-09-16 ASSESSMENT — ENCOUNTER SYMPTOMS
ABDOMINAL PAIN: 0
SHORTNESS OF BREATH: 1
DIZZINESS: 1

## 2019-09-16 ASSESSMENT — MIFFLIN-ST. JEOR: SCORE: 1922.36

## 2019-09-16 NOTE — ED TRIAGE NOTES
"Cp started this morning with some SOB.  \"dont usually drink but I went out last night and drank too much and woke up with cp\"    "

## 2019-09-16 NOTE — ED AVS SNAPSHOT
Emergency Department  [address] [facility] [address]  Phone:  [phone]  Fax:  [phone]                                    Carlos A Connor   MRN: [id number]    Department:   Emergency Department   Date of Visit:  9/16/2019           After Visit Summary Signature Page    I have received my discharge instructions, and my questions have been answered. I have discussed any challenges I see with this plan with the nurse or doctor.    ..........................................................................................................................................  Patient/Patient Representative Signature      ..........................................................................................................................................  Patient Representative Print Name and Relationship to Patient    ..................................................               ................................................  Date                                   Time    ..........................................................................................................................................  Reviewed by Signature/Title    ...................................................              ..............................................  Date                                               Time          22EPIC Rev 08/18

## 2019-09-17 NOTE — ED NOTES
Bed: ED28  Expected date: 9/16/19  Expected time: 8:23 PM  Means of arrival:   Comments:  Triage t.s

## 2019-09-17 NOTE — ED PROVIDER NOTES
"  History     Chief Complaint:  Chest Pain      HPI   Carlos A Connor is a 38 year old male with a history of anxiety and substance abuse, who presents with chest pain and shortness of breath upon waking this morning, with continued chest pain here. The patient reports that he usually drinks once a month, but consumed 5 beers last night. He endorses some dizziness. He denies abdominal pain. No recent travel.     CARDIAC RISK FACTORS:  Sex:    Male   Tobacco:   Former   Hypertension:   No   Hyperlipidemia:  No   Diabetes:   No   Family History:  No     PE/DVT RISK FACTORS:  Sex:    Male   Hormones:   No   Tobacco:   Former   Cancer:   No   Travel:   No   Surgery:   No   Other immobilization: No   Personal history:  No   Family history:  No       Allergies:  Droperidol     Medications:    Ativan  Paxil    Past Medical History:    Anxiety  Migraines  Panic attack  Cocaine abuse  Methamphetamine abuse    Past Surgical History:    History reviewed. No pertinent surgical history.     Family History:    History reviewed. No pertinent family history.       Social History:  Smoking status: former   Alcohol use: yes   Drug use: yes  PCP: Joel Daniel Wegener  Marital Status:  Single      Review of Systems   Respiratory: Positive for shortness of breath (resolved).    Cardiovascular: Positive for chest pain. Negative for leg swelling.   Gastrointestinal: Negative for abdominal pain.   Neurological: Positive for dizziness.   10 point review of systems performed and is negative except as above and in HPI.       Physical Exam     Patient Vitals for the past 24 hrs:   BP Temp Temp src Pulse Resp SpO2 Height Weight   09/16/19 2200 137/83 -- -- 81 -- 97 % -- --   09/16/19 1855 (!) 152/102 98.3  F (36.8  C) Temporal 100 16 98 % 1.803 m (5' 11\") 97.5 kg (215 lb)        Physical Exam  General: Resting on the gurney, appears uncomfortable  Head:  The scalp, face, and head appear normal  Mouth/Throat: Mucus membranes are " moist  CV:  Regular rate    Normal S1 and S2  No pathological murmur   Resp:  Breath sounds clear and equal bilaterally    Non-labored, no retractions or accessory muscle use    No coarseness    No wheezing   GI:  Abdomen is soft, no rigidity    Epigastric tenderness to palpation. No guarding or rebound. Negative mayo's sign.   MS:  Normal motor assessment of all extremities.    Good capillary refill noted.      Skin:   No rash or lesions noted.  Neuro:   Speech is normal and fluent. No apparent deficit.  Psych: Awake. Alert.  Normal affect.      Appropriate interactions.      Emergency Department Course   ECG:  ECG (19:02:32):  Rate 98 bpm. IA interval 138. QRS duration 84. QT/QTc 354/451. P-R-T axes 58 38 28. Nonspecific T wave abnormality. Abnormal ECG. Interpreted by Linda Hernandez MD.    Laboratory:  Troponin 1 (2042): <0.015  CBC: WBC 9.1, HGB 15.0,   CMP: Glucose 101 (H), creatinine 0.62 (L), o/w WNL     Interventions:  2043: NS 1L IV Bolus   2157: GI cocktail 30 ml PO    Emergency Department Course:  2039: Nursing notes and vitals reviewed.  I performed an exam of the patient as documented above.     IV inserted. Medicine administered as documented above. Blood drawn. This was sent to the lab for further testing, results above.    2224: I rechecked the patient and discussed the results of his workup thus far. He feels better after GI cocktail.     Findings and plan explained to the Patient. Patient discharged home with instructions regarding supportive care, medications, and reasons to return. The importance of close follow-up was reviewed. The patient was prescribed Prilosec.     I personally reviewed the laboratory results with the Patient and answered all related questions prior to discharge.     Impression & Plan      Medical Decision Making:  This patient is presenting with epigastric pain. At this point, the exact etiology is unknown, however I feel we have adequately assessed for acute and  dangerous pathology. LFT's and lipase are negative. There is no right upper quadrant tenderness, jaundice, or fevers to increase my concern for cholelithiasis or gallbladder infection. Given this I feel that RUQ U/S is not needed emergently; if sxs continue I have recommended it on an outpt basis. I do not suspect bowel obstruction as the pt is not having significant vomiting, is having nl bowel movements,  normal bowel sounds, there no rebound/guarding/peritoneal signs. The pt does not have risk factors to make a AAA likely. Otherwise, although obviously uncomfortable,  is well appearing. I suspect this pain may be secondary to alcoholic gastritis versus peptic ulcer disease. The pt does not have a high caffeine intake or high NSAID use. We discussed limiting these to help prevent progression of the pain.  Here did have relief with the GI cocktail. We discussed options for treating this and ultimately have decided to initiate Prilosec Qday. I explained that if symptoms do not remarkable improve,  should follow up with PCP as they may do an outpt H.pylori test and/or refer  to GI. We discussed signs and symptom that should prompt  return to the emergency department.  At this point feels comfortable with discharge.    Critical Care time:  none    Diagnosis:    ICD-10-CM    1. Acute gastritis without hemorrhage, unspecified gastritis type K29.00        Disposition:  discharged to home    Discharge Medications:  Discharge Medication List as of 9/16/2019 10:28 PM      START taking these medications    Details   omeprazole (PRILOSEC) 20 MG DR capsule Take 1 capsule (20 mg) by mouth daily, Disp-30 capsule, R-0, Local Print           I, Bina Mccullough, am serving as a scribe at 8:39 PM on 9/16/2019 to document services personally performed by Linda Hernandez MD based on my observations and the provider's statements to me.       Bina Mccullough  9/16/2019    EMERGENCY DEPARTMENT       Linda Hernandez MD  09/20/19 5007

## 2019-12-04 ENCOUNTER — APPOINTMENT (OUTPATIENT)
Dept: GENERAL RADIOLOGY | Facility: CLINIC | Age: 39
End: 2019-12-04
Attending: NURSE PRACTITIONER
Payer: COMMERCIAL

## 2019-12-04 ENCOUNTER — HOSPITAL ENCOUNTER (EMERGENCY)
Facility: CLINIC | Age: 39
Discharge: HOME | End: 2019-12-04
Attending: NURSE PRACTITIONER | Admitting: NURSE PRACTITIONER
Payer: COMMERCIAL

## 2019-12-04 VITALS
SYSTOLIC BLOOD PRESSURE: 155 MMHG | DIASTOLIC BLOOD PRESSURE: 104 MMHG | HEART RATE: 71 BPM | BODY MASS INDEX: 29.4 KG/M2 | TEMPERATURE: 98 F | WEIGHT: 210 LBS | HEIGHT: 71 IN | OXYGEN SATURATION: 97 % | RESPIRATION RATE: 14 BRPM

## 2019-12-04 DIAGNOSIS — R07.9 CHEST PAIN: ICD-10-CM

## 2019-12-04 LAB
ALBUMIN SERPL-MCNC: 3.8 G/DL (ref 3.4–5)
ALP SERPL-CCNC: 107 U/L (ref 40–150)
ALT SERPL W P-5'-P-CCNC: 54 U/L (ref 0–70)
ANION GAP SERPL CALCULATED.3IONS-SCNC: 6 MMOL/L (ref 3–14)
AST SERPL W P-5'-P-CCNC: 16 U/L (ref 0–45)
BASOPHILS # BLD AUTO: 0 10E9/L (ref 0–0.2)
BASOPHILS NFR BLD AUTO: 0.4 %
BILIRUB SERPL-MCNC: 0.6 MG/DL (ref 0.2–1.3)
BUN SERPL-MCNC: 17 MG/DL (ref 7–30)
CALCIUM SERPL-MCNC: 8.7 MG/DL (ref 8.5–10.1)
CHLORIDE SERPL-SCNC: 108 MMOL/L (ref 94–109)
CO2 SERPL-SCNC: 26 MMOL/L (ref 20–32)
CREAT SERPL-MCNC: 0.61 MG/DL (ref 0.66–1.25)
DIFFERENTIAL METHOD BLD: NORMAL
EOSINOPHIL # BLD AUTO: 0.4 10E9/L (ref 0–0.7)
EOSINOPHIL NFR BLD AUTO: 5.3 %
ERYTHROCYTE [DISTWIDTH] IN BLOOD BY AUTOMATED COUNT: 12.9 % (ref 10–15)
GFR SERPL CREATININE-BSD FRML MDRD: >90 ML/MIN/{1.73_M2}
GLUCOSE SERPL-MCNC: 104 MG/DL (ref 70–99)
HCT VFR BLD AUTO: 43 % (ref 40–53)
HGB BLD-MCNC: 14.6 G/DL (ref 13.3–17.7)
IMM GRANULOCYTES # BLD: 0 10E9/L (ref 0–0.4)
IMM GRANULOCYTES NFR BLD: 0.3 %
INTERPRETATION ECG - MUSE: NORMAL
LIPASE SERPL-CCNC: 75 U/L (ref 73–393)
LYMPHOCYTES # BLD AUTO: 3.2 10E9/L (ref 0.8–5.3)
LYMPHOCYTES NFR BLD AUTO: 41.9 %
MCH RBC QN AUTO: 29.2 PG (ref 26.5–33)
MCHC RBC AUTO-ENTMCNC: 34 G/DL (ref 31.5–36.5)
MCV RBC AUTO: 86 FL (ref 78–100)
MONOCYTES # BLD AUTO: 0.7 10E9/L (ref 0–1.3)
MONOCYTES NFR BLD AUTO: 8.6 %
NEUTROPHILS # BLD AUTO: 3.4 10E9/L (ref 1.6–8.3)
NEUTROPHILS NFR BLD AUTO: 43.5 %
NRBC # BLD AUTO: 0 10*3/UL
NRBC BLD AUTO-RTO: 0 /100
PLATELET # BLD AUTO: 199 10E9/L (ref 150–450)
POTASSIUM SERPL-SCNC: 3.7 MMOL/L (ref 3.4–5.3)
PROT SERPL-MCNC: 7.7 G/DL (ref 6.8–8.8)
RBC # BLD AUTO: 5 10E12/L (ref 4.4–5.9)
SODIUM SERPL-SCNC: 140 MMOL/L (ref 133–144)
TROPONIN I SERPL-MCNC: <0.015 UG/L (ref 0–0.04)
WBC # BLD AUTO: 7.7 10E9/L (ref 4–11)

## 2019-12-04 PROCEDURE — 25000132 ZZH RX MED GY IP 250 OP 250 PS 637: Performed by: NURSE PRACTITIONER

## 2019-12-04 PROCEDURE — 83690 ASSAY OF LIPASE: CPT | Performed by: NURSE PRACTITIONER

## 2019-12-04 PROCEDURE — 25000125 ZZHC RX 250: Performed by: NURSE PRACTITIONER

## 2019-12-04 PROCEDURE — 85025 COMPLETE CBC W/AUTO DIFF WBC: CPT | Performed by: NURSE PRACTITIONER

## 2019-12-04 PROCEDURE — 84484 ASSAY OF TROPONIN QUANT: CPT | Performed by: NURSE PRACTITIONER

## 2019-12-04 PROCEDURE — 80053 COMPREHEN METABOLIC PANEL: CPT | Performed by: NURSE PRACTITIONER

## 2019-12-04 PROCEDURE — 99285 EMERGENCY DEPT VISIT HI MDM: CPT | Mod: 25

## 2019-12-04 PROCEDURE — 93005 ELECTROCARDIOGRAM TRACING: CPT

## 2019-12-04 PROCEDURE — 71046 X-RAY EXAM CHEST 2 VIEWS: CPT

## 2019-12-04 RX ORDER — ALUMINA, MAGNESIA, AND SIMETHICONE 2400; 2400; 240 MG/30ML; MG/30ML; MG/30ML
15 SUSPENSION ORAL ONCE
Status: COMPLETED | OUTPATIENT
Start: 2019-12-04 | End: 2019-12-04

## 2019-12-04 RX ORDER — LIDOCAINE HYDROCHLORIDE 20 MG/ML
15 SOLUTION OROPHARYNGEAL ONCE
Status: COMPLETED | OUTPATIENT
Start: 2019-12-04 | End: 2019-12-04

## 2019-12-04 RX ADMIN — ALUMINUM HYDROXIDE, MAGNESIUM HYDROXIDE, AND DIMETHICONE 15 ML: 400; 400; 40 SUSPENSION ORAL at 17:02

## 2019-12-04 RX ADMIN — LIDOCAINE HYDROCHLORIDE 15 ML: 20 SOLUTION ORAL; TOPICAL at 17:02

## 2019-12-04 ASSESSMENT — ENCOUNTER SYMPTOMS
FEVER: 0
WEAKNESS: 1
COUGH: 0
SHORTNESS OF BREATH: 0

## 2019-12-04 ASSESSMENT — MIFFLIN-ST. JEOR: SCORE: 1894.68

## 2019-12-04 NOTE — ED AVS SNAPSHOT
Emergency Department  [address] [facility] [address]  Phone:  [phone]  Fax:  [phone]                                    Carlos A Connor   MRN: [id number]    Department:   Emergency Department   Date of Visit:  12/4/2019           After Visit Summary Signature Page    I have received my discharge instructions, and my questions have been answered. I have discussed any challenges I see with this plan with the nurse or doctor.    ..........................................................................................................................................  Patient/Patient Representative Signature      ..........................................................................................................................................  Patient Representative Print Name and Relationship to Patient    ..................................................               ................................................  Date                                   Time    ..........................................................................................................................................  Reviewed by Signature/Title    ...................................................              ..............................................  Date                                               Time          22EPIC Rev 08/18

## 2019-12-04 NOTE — ED PROVIDER NOTES
"    History     Chief Complaint:  Chest Pain    HPI   Carlos A Connor is a 39 year old male who presents with chest pain. He reports that he developed weakness in his right arm like it was \"sleeping\" 2 days ago in the evening. He reports that he developed chest pain and similar weakness in his left arm last evening while he was cooking. He reports that his chest pain worsens with breathing. He states that he took a Tylenol this morning with no relief of his symptoms. He reports that theses symptoms worried him enough to present to the emergency department today and states that he still has chest pain and subjective weakness in his left arm. He reports that his right arm symptoms have improved somewhat. He denies recent cough, cold, shortness of breath, leg swelling, or fever. He reports that his symptoms are similar than they were the last few times that he presented to the emergency department with chest pain. He denies that he has traveled recently or smokes. He denies a personal history of heart disease, cancer, or clotting problems. He denies a family history of heart attacks. He reports that he hasn't seen a doctor for 2 years and has never been evaluated by a cardiologist.  He notes he took an ativan today without relief.     Allergies:  Droperidol    Medications:    Ativan    Past Medical History:    Polysubstance abuse  Migraine  Panic attacks  Anxiety  Pericarditis    Past Surgical History:    The patient does not have any pertinent past surgical history.    Family History:    Heart valve problem - brother    Social History:  Smoking status: Former  Alcohol use: Binge drinking    Marital Status:  Single    Review of Systems   Constitutional: Negative for fever.   Respiratory: Negative for cough and shortness of breath.    Cardiovascular: Positive for chest pain. Negative for leg swelling.   Neurological: Positive for weakness.   All other systems reviewed and are negative.    Physical Exam     Patient " "Vitals for the past 24 hrs:   BP Temp Temp src Pulse Resp SpO2 Height Weight   12/04/19 1810 (!) 155/104 -- -- 71 -- 97 % -- --   12/04/19 1714 -- -- -- -- -- 96 % -- --   12/04/19 1628 (!) 152/98 98  F (36.7  C) Temporal 70 14 97 % 1.803 m (5' 11\") 95.3 kg (210 lb)       Physical Exam  Nursing notes reviewed. Vitals reviewed.  General: Alert. Well kept.  Eyes:  Conjunctiva non-injected, non-icteric.  Neck/Throat: Moist mucous membranes. Normal voice.  Cardiac: Regular rhythm. Normal heart sounds. 2+ radial pulses bilateral.  No peripheral edema.   Pulmonary: Clear and equal breath sounds bilaterally.   Musculoskeletal: 5/5 strength of all 4 extremities.   Neurological: Alert and oriented x4.  Normal sensation and motion to bilateral distal hands with intact radial, medial,and ulnar nerve distributions.  Skin: Warm and dry. Normal appearance of visualized exposed skin without rashes or petechiae.  Psych: Affect normal. Good eye contact.    Emergency Department Course   ECG:  Indication: chest pain  Time: 1635  Vent. Rate 71 bpm. AZ interval 144. QRS duration 90. QT/QTc 422/458. P-R-T axis 37 5 4. Normal sinus rhythm. Moderate voltage criteria for LVH, may be normal variant. Nonspecific ST and T wave abnormality.Abnormal ECG. Read time: 1639. No significant change compared to EKG dated 9/16/19.     Imaging:  Radiographic findings were communicated with the patient who voiced understanding of the findings.    XR Chest 2 views:   PA and lateral views of the chest. Lungs are clear. Heart  is normal in size. No effusions are evident. No pneumothorax.    Read as per radiology.    Laboratory:  CBC: WBC: 7.7, HGB: 14.6, PLT: 199    1706 Troponin: <0.015    CMP: Glucose 104 (H), o/w WNL (Creatinine: 0.61 (L))    Lipase: 75    Interventions:  1702: GI Cocktail - Maalox 15 mL, Viscous Lidocaine 15 mL, 30 mL suspension PO     Emergency Department Course:  Nursing notes and vitals reviewed. (1642) I performed an exam of the " patient as documented above.     IV inserted. Medicine administered as documented above. Blood drawn. This was sent to the lab for further testing, results above.    The patient was sent for a chest x-ray while in the emergency department, findings above.     1800 I rechecked the patient and discussed the results of his workup thus far.     Findings and plan explained to the Patient. Patient discharged home with instructions regarding supportive care and reasons to return. The importance of close follow-up was reviewed.     I personally reviewed the laboratory results with the Patient and answered all related questions prior to discharge.    Impression & Plan    Medical Decision Making:  Carlos A Connor, a 39 year old male, presents for evaluation of chest pain. EKG is unchanged from prior and troponin is negative after >6 hours of continuous pain with a normal troponin, acute coronary syndrome is considered unlikely. He is PERC negative and PE and dissection are considered very unlikely. CMP and CBC including lipase returned without acute abnormality. Chest x-ray shows no widened mediastinum, pneumothorax or pneumonia. The patient had complete relief of his symptoms following a GI cocktail. Symptoms are most consistent with gastritis/GERD. He denies any recent drug use or cocaine. He was noted to be mildly hypertensive in the emergency department. No signs of end organ damage or indication for emergent reduction in the emergency department. The patient does have primary care and will follow up with them this week. He does have previous stress testing, which was normal, in 2017 per patient report. I was unable to validate this. He will follow up with primary care regarding need for further testing.  He was given a prescription for omeprazole until seen for follow-up.     Diagnosis:    ICD-10-CM    1. Chest pain R07.9      Disposition:  discharged to home  Ceasar Parikh  12/4/2019    EMERGENCY DEPARTMENT  [place]  Disclosure:  I, Ceasar Jl, am serving as a scribe on 12/4/2019 at 4:42 PM to personally document services performed by Gloria Muñoz, RAYA based on my observations and the provider's statements to me.     Gloria Muñoz, CNP  12/04/19 4570

## 2020-10-26 ENCOUNTER — HOSPITAL ENCOUNTER (EMERGENCY)
Facility: CLINIC | Age: 40
Discharge: HOME | End: 2020-10-26
Attending: EMERGENCY MEDICINE | Admitting: EMERGENCY MEDICINE
Payer: OTHER GOVERNMENT

## 2020-10-26 ENCOUNTER — APPOINTMENT (OUTPATIENT)
Dept: GENERAL RADIOLOGY | Facility: CLINIC | Age: 40
End: 2020-10-26
Attending: EMERGENCY MEDICINE
Payer: OTHER GOVERNMENT

## 2020-10-26 VITALS
RESPIRATION RATE: 12 BRPM | TEMPERATURE: 98.8 F | DIASTOLIC BLOOD PRESSURE: 93 MMHG | HEART RATE: 88 BPM | OXYGEN SATURATION: 97 % | SYSTOLIC BLOOD PRESSURE: 139 MMHG

## 2020-10-26 DIAGNOSIS — R07.9 CHEST PAIN, UNSPECIFIED TYPE: ICD-10-CM

## 2020-10-26 DIAGNOSIS — R06.02 SHORTNESS OF BREATH: ICD-10-CM

## 2020-10-26 DIAGNOSIS — I44.7 LEFT BUNDLE BRANCH BLOCK: ICD-10-CM

## 2020-10-26 LAB
ANION GAP SERPL CALCULATED.3IONS-SCNC: 10 MMOL/L (ref 3–14)
BASOPHILS # BLD AUTO: 0 10E9/L (ref 0–0.2)
BASOPHILS NFR BLD AUTO: 0.4 %
BUN SERPL-MCNC: 10 MG/DL (ref 7–30)
CALCIUM SERPL-MCNC: 8.7 MG/DL (ref 8.5–10.1)
CHLORIDE SERPL-SCNC: 108 MMOL/L (ref 94–109)
CO2 SERPL-SCNC: 24 MMOL/L (ref 20–32)
CREAT SERPL-MCNC: 0.6 MG/DL (ref 0.66–1.25)
D DIMER PPP FEU-MCNC: <0.3 UG/ML FEU (ref 0–0.5)
DIFFERENTIAL METHOD BLD: NORMAL
EOSINOPHIL # BLD AUTO: 0.1 10E9/L (ref 0–0.7)
EOSINOPHIL NFR BLD AUTO: 1.3 %
ERYTHROCYTE [DISTWIDTH] IN BLOOD BY AUTOMATED COUNT: 13.2 % (ref 10–15)
GFR SERPL CREATININE-BSD FRML MDRD: >90 ML/MIN/{1.73_M2}
GLUCOSE SERPL-MCNC: 136 MG/DL (ref 70–99)
HCT VFR BLD AUTO: 45.9 % (ref 40–53)
HGB BLD-MCNC: 15.2 G/DL (ref 13.3–17.7)
IMM GRANULOCYTES # BLD: 0 10E9/L (ref 0–0.4)
IMM GRANULOCYTES NFR BLD: 0.1 %
INTERPRETATION ECG - MUSE: NORMAL
LYMPHOCYTES # BLD AUTO: 3 10E9/L (ref 0.8–5.3)
LYMPHOCYTES NFR BLD AUTO: 39.4 %
MCH RBC QN AUTO: 28.9 PG (ref 26.5–33)
MCHC RBC AUTO-ENTMCNC: 33.1 G/DL (ref 31.5–36.5)
MCV RBC AUTO: 87 FL (ref 78–100)
MONOCYTES # BLD AUTO: 0.5 10E9/L (ref 0–1.3)
MONOCYTES NFR BLD AUTO: 7 %
NEUTROPHILS # BLD AUTO: 3.9 10E9/L (ref 1.6–8.3)
NEUTROPHILS NFR BLD AUTO: 51.8 %
NRBC # BLD AUTO: 0 10*3/UL
NRBC BLD AUTO-RTO: 0 /100
PLATELET # BLD AUTO: 238 10E9/L (ref 150–450)
POTASSIUM SERPL-SCNC: 3.6 MMOL/L (ref 3.4–5.3)
RBC # BLD AUTO: 5.26 10E12/L (ref 4.4–5.9)
SODIUM SERPL-SCNC: 142 MMOL/L (ref 133–144)
TROPONIN I SERPL-MCNC: <0.015 UG/L (ref 0–0.04)
TROPONIN I SERPL-MCNC: <0.015 UG/L (ref 0–0.04)
WBC # BLD AUTO: 7.6 10E9/L (ref 4–11)

## 2020-10-26 PROCEDURE — 93005 ELECTROCARDIOGRAM TRACING: CPT

## 2020-10-26 PROCEDURE — 85379 FIBRIN DEGRADATION QUANT: CPT | Performed by: EMERGENCY MEDICINE

## 2020-10-26 PROCEDURE — 96361 HYDRATE IV INFUSION ADD-ON: CPT

## 2020-10-26 PROCEDURE — 80048 BASIC METABOLIC PNL TOTAL CA: CPT | Performed by: EMERGENCY MEDICINE

## 2020-10-26 PROCEDURE — C9803 HOPD COVID-19 SPEC COLLECT: HCPCS

## 2020-10-26 PROCEDURE — 85025 COMPLETE CBC W/AUTO DIFF WBC: CPT | Performed by: EMERGENCY MEDICINE

## 2020-10-26 PROCEDURE — 258N000003 HC RX IP 258 OP 636: Performed by: EMERGENCY MEDICINE

## 2020-10-26 PROCEDURE — U0003 INFECTIOUS AGENT DETECTION BY NUCLEIC ACID (DNA OR RNA); SEVERE ACUTE RESPIRATORY SYNDROME CORONAVIRUS 2 (SARS-COV-2) (CORONAVIRUS DISEASE [COVID-19]), AMPLIFIED PROBE TECHNIQUE, MAKING USE OF HIGH THROUGHPUT TECHNOLOGIES AS DESCRIBED BY CMS-2020-01-R: HCPCS | Performed by: EMERGENCY MEDICINE

## 2020-10-26 PROCEDURE — 96360 HYDRATION IV INFUSION INIT: CPT

## 2020-10-26 PROCEDURE — 250N000013 HC RX MED GY IP 250 OP 250 PS 637: Performed by: EMERGENCY MEDICINE

## 2020-10-26 PROCEDURE — 99285 EMERGENCY DEPT VISIT HI MDM: CPT | Mod: 25

## 2020-10-26 PROCEDURE — 71045 X-RAY EXAM CHEST 1 VIEW: CPT

## 2020-10-26 PROCEDURE — 84484 ASSAY OF TROPONIN QUANT: CPT | Performed by: EMERGENCY MEDICINE

## 2020-10-26 RX ORDER — SODIUM CHLORIDE 9 MG/ML
INJECTION, SOLUTION INTRAVENOUS CONTINUOUS
Status: DISCONTINUED | OUTPATIENT
Start: 2020-10-26 | End: 2020-10-26 | Stop reason: HOSPADM

## 2020-10-26 RX ORDER — ASPIRIN 325 MG
325 TABLET ORAL ONCE
Status: COMPLETED | OUTPATIENT
Start: 2020-10-26 | End: 2020-10-26

## 2020-10-26 RX ADMIN — ASPIRIN 325 MG ORAL TABLET 325 MG: 325 PILL ORAL at 11:03

## 2020-10-26 RX ADMIN — SODIUM CHLORIDE 1000 ML: 9 INJECTION, SOLUTION INTRAVENOUS at 08:57

## 2020-10-26 NOTE — ED PROVIDER NOTES
History     Chief Complaint:  Chest Pain     HPI   Carlos A Connor is a 40 year old male with history of polysubstance abuse who presents for evaluation of chest pain and shortness of breath.  Patient states that last night he was drinking whiskey and does not think he was using any other drugs.  He woke up around 6 AM with shortness of breath and left-sided chest discomfort.  Symptoms have persisted since onset.  He also feels generally weak.  He has not taken any medicines for his pain.  He denies any recent fever or cough or vomiting or diarrhea or abdominal pain.  He denies any swelling in his legs or history of DVT or MI.    Allergies:  Droperidol    Medications:   Ativan   Paxil     Past Medical History:    Anxiety   Chest pain   Migraines   Panic attack  Polysubstance abuse    Past Surgical History:    Past surgical history reviewed. No pertinent past surgical history.      Family History:    Family history reviewed. No pertinent family history.     Social History:  The patient was unaccompanied to the ED.  Smoking Status: Former Smoker  Smokeless Tobacco: Never Used  Alcohol Use: Positive  Drug Use: Positive   Types: Cocaine, meth   PCP: Wegener, Joel Daniel Irwin     Review of Systems  A 10 point ROS was obtained and negative except as noted here and in HPI    Physical Exam     Patient Vitals for the past 24 hrs:   BP Temp Temp src Pulse Resp SpO2   10/26/20 1200 (!) 139/93 -- -- 88 12 97 %   10/26/20 1130 138/83 -- -- 96 20 97 %   10/26/20 1100 (!) 141/99 -- -- 85 11 99 %   10/26/20 0940 -- -- -- 80 18 96 %   10/26/20 0930 -- -- -- 84 19 97 %   10/26/20 0920 -- -- -- 82 20 97 %   10/26/20 0910 -- -- -- 87 19 95 %   10/26/20 0900 -- -- -- 86 20 96 %   10/26/20 0850 -- -- -- 92 16 96 %   10/26/20 0844 (!) 146/88 98.8  F (37.1  C) Oral 94 18 95 %   10/26/20 0840 (!) 146/90 -- -- -- -- 96 %       Physical Exam  VS: Reviewed per above  HENT: Mucous membranes moist  EYES: sclera anicteric  CV: Rate as  noted, regular rhythm.   RESP: Effort normal. Breath sounds are normal bilaterally.  GI: no tenderness/rebound/guarding, not distended.  NEURO: Alert, moving all extremities  MSK: No deformity of the extremities  SKIN: Warm and dry    Emergency Department Course     ECG:  ECG taken at 0847  Normal sinus rhythm  Left bundle branch block   Abnormal ECG  New LBBB compared to prior EKG.   Rate 93 bpm. TX interval 150 ms. QRS duration 146 ms. QT/QTc 430/534 ms. P-R-T axes 61 54 -87.    Imaging:  Radiology findings were communicated with the patient who voiced understanding of the findings.     XR Chest Port 1 View  Portable chest. Lungs are clear. Heart is normal in size.  No pneumothorax. No definite pleural effusions.  KATHRINE HERNÁNDEZ MD  Reading per radiology      Laboratory:  Laboratory findings were communicated with the patient who voiced understanding of the findings.    Asymptomatic COVID-19 Virus (Coronavirus) by PCR Nasopharyngeal swab: Pending    CBC: WBC 7.6, HGB 15.2,   BMP: Glucose 136 (H), o/w WNL (Creatinine 0.60 (L))     D Dimer: <0.3    Troponin (Collected 0856): <0.015  Troponin (Collected 1104): <0.015      Interventions:  0857 NS 1000 mL IV   1103 aspirin 325 mg PO     Emergency Department Course:    0835 Nursing notes and vitals reviewed. I performed an exam of the patient as documented above.     0847 EKG obtained as noted above.     0856 IV was inserted and blood was drawn for laboratory testing, results above.    1029 Portable chest XR obtained in the ED, results above.      1104 Blood drawn. This was sent to the lab for further testing, results above.    1218 Prior to discharge, I personally reviewed the results with the patient and all related questions were answered. The patient verbalized understanding and is amenable to plan.     Impression & Plan      Covid-19  Carlos A Connor was evaluated during a global COVID-19 pandemic, which necessitated consideration that the patient might be  at risk for infection with the SARS-CoV-2 virus that causes COVID-19.   Applicable protocols for evaluation were followed during the patient's care.   COVID-19 was considered as part of the patient's evaluation. The plan for testing is:  a test was obtained during this visit.    Medical Decision Making:  Carlos A Connor presented to the ER with chest pain/sob. A broad workup did not reveal a specific cause of the patient's pain. CXR did not reveal wide mediastinum and nature of pain not c/w aortic dissection. No radiographic evidence of pneumothorax nor PNA either. Hx and lack of pneumomediastinum on CXR make boerhaave's syndrome unlikely. Based on Well's and negative ddimer, PE was also deemed unlikely. ECG did not show signs of STEMI nor other specific signs of ischemia.  There was an incidental new left bundle branch block compared to prior EKG in our system, although it did not meet sgarbossa criteria for MI. Serial troponin testing was negative, thus no signs of acute MI. Hx is also not consistent with unstable angina. Nature of pain in conjunction with reassuring ECG and negative troponin makes pericarditis and myocarditis unlikely as well.  On reassessment, patient symptoms have resolved.  Outpatient stress echocardiogram was ordered and patient is agreeable to this.  I encouraged close primary care follow-up.  Return precautions were discussed prior to discharge.    Diagnosis:      ICD-10-CM    1. Chest pain, unspecified type  R07.9 Asymptomatic COVID-19 Virus (Coronavirus) by PCR     Echo Stress Echocardiogram   2. Shortness of breath  R06.02    3. Left bundle branch block  I44.7 Echo Stress Echocardiogram       Disposition:   The patient is discharged to home.     Discharge Medications:  Discharge Medication List as of 10/26/2020 12:33 PM        Scribe Disclosure:  I, Orla Severson, lakeshia serving as a scribe at 8:37 AM on 10/26/2020 to document services personally performed by Wilfredo Akins MD based  on my observations and the provider's statements to me.     [facility] EMERGENCY DEPT         Wilfredo Akins MD  10/26/20 [id number]

## 2020-10-26 NOTE — ED AVS SNAPSHOT
[facility] Emergency Dept  [address] [facility] [address]  Phone: [phone]  Fax: [phone]                                    Carlos A Connor   MRN: [id number]    Department: [facility] Emergency Dept   Date of Visit: 10/26/2020           After Visit Summary Signature Page    I have received my discharge instructions, and my questions have been answered. I have discussed any challenges I see with this plan with the nurse or doctor.    ..........................................................................................................................................  Patient/Patient Representative Signature      ..........................................................................................................................................  Patient Representative Print Name and Relationship to Patient    ..................................................               ................................................  Date                                   Time    ..........................................................................................................................................  Reviewed by Signature/Title    ...................................................              ..............................................  Date                                               Time          22EPIC Rev 08/18

## 2020-10-27 LAB
SARS-COV-2 RNA SPEC QL NAA+PROBE: NOT DETECTED
SPECIMEN SOURCE: NORMAL

## 2020-11-09 ENCOUNTER — DOCUMENTATION ONLY (OUTPATIENT)
Dept: CARDIOLOGY | Facility: CLINIC | Age: 40
End: 2020-11-09

## 2020-11-09 NOTE — PROGRESS NOTES
We have patient Carlos A Cortes scheduled today for a stress echocardiogram. Upon review of the patient's chart, it appears the patient has a left bundle branch block. Cardiology (Dr. Patterson) verbally advised that the test be changed to a nuclear lexiscan stress test instead of the stress echocardiogram. The stress echo order was cancelled, the new order was placed and the patient is being called by scheduling to schedule the nuclear lexiscan stress test.

## 2020-11-10 ENCOUNTER — HOSPITAL ENCOUNTER (OUTPATIENT)
Dept: CARDIOLOGY | Facility: CLINIC | Age: 40
End: 2020-11-10
Attending: INTERNAL MEDICINE

## 2020-11-10 VITALS
HEART RATE: 90 BPM | DIASTOLIC BLOOD PRESSURE: 92 MMHG | OXYGEN SATURATION: 97 % | SYSTOLIC BLOOD PRESSURE: 148 MMHG | BODY MASS INDEX: 30.57 KG/M2 | HEIGHT: 71 IN | WEIGHT: 218.4 LBS

## 2020-11-10 DIAGNOSIS — R07.9 CHEST PAIN, UNSPECIFIED: ICD-10-CM

## 2020-11-10 DIAGNOSIS — I44.7 LEFT BUNDLE-BRANCH BLOCK: ICD-10-CM

## 2020-11-10 LAB
CV STRESS MAX HR HE: 87
NUC STRESS EJECTION FRACTION: 43 %
RATE PRESSURE PRODUCT: NORMAL
STRESS ECHO BASELINE DIASTOLIC HE: 92
STRESS ECHO BASELINE HR: 68
STRESS ECHO BASELINE SYSTOLIC BP: 148
STRESS ECHO CALCULATED PERCENT HR: 48 %
STRESS ECHO LAST STRESS DIASTOLIC BP: 84
STRESS ECHO LAST STRESS SYSTOLIC BP: 146
STRESS ECHO TARGET HR: 181

## 2020-11-10 PROCEDURE — 250N000011 HC RX IP 250 OP 636: Performed by: EMERGENCY MEDICINE

## 2020-11-10 PROCEDURE — 93018 CV STRESS TEST I&R ONLY: CPT | Performed by: INTERNAL MEDICINE

## 2020-11-10 PROCEDURE — 93016 CV STRESS TEST SUPVJ ONLY: CPT | Performed by: INTERNAL MEDICINE

## 2020-11-10 PROCEDURE — A9502 TC99M TETROFOSMIN: HCPCS | Performed by: INTERNAL MEDICINE

## 2020-11-10 PROCEDURE — 343N000001 HC RX 343: Performed by: INTERNAL MEDICINE

## 2020-11-10 PROCEDURE — 78452 HT MUSCLE IMAGE SPECT MULT: CPT | Mod: 26 | Performed by: INTERNAL MEDICINE

## 2020-11-10 PROCEDURE — 93017 CV STRESS TEST TRACING ONLY: CPT

## 2020-11-10 PROCEDURE — A9502 TC99M TETROFOSMIN: HCPCS | Performed by: EMERGENCY MEDICINE

## 2020-11-10 PROCEDURE — 343N000001 HC RX 343: Performed by: EMERGENCY MEDICINE

## 2020-11-10 RX ORDER — ALBUTEROL SULFATE 90 UG/1
2 AEROSOL, METERED RESPIRATORY (INHALATION) EVERY 5 MIN PRN
Status: DISCONTINUED | OUTPATIENT
Start: 2020-11-10 | End: 2020-11-11 | Stop reason: HOSPADM

## 2020-11-10 RX ORDER — ACYCLOVIR 200 MG/1
0-1 CAPSULE ORAL
Status: DISCONTINUED | OUTPATIENT
Start: 2020-11-10 | End: 2020-11-11 | Stop reason: HOSPADM

## 2020-11-10 RX ORDER — AMINOPHYLLINE 25 MG/ML
50-100 INJECTION, SOLUTION INTRAVENOUS
Status: DISCONTINUED | OUTPATIENT
Start: 2020-11-10 | End: 2020-11-11 | Stop reason: HOSPADM

## 2020-11-10 RX ORDER — REGADENOSON 0.08 MG/ML
0.4 INJECTION, SOLUTION INTRAVENOUS ONCE
Status: COMPLETED | OUTPATIENT
Start: 2020-11-10 | End: 2020-11-10

## 2020-11-10 RX ORDER — CAFFEINE CITRATE 20 MG/ML
60 SOLUTION INTRAVENOUS
Status: DISCONTINUED | OUTPATIENT
Start: 2020-11-10 | End: 2020-11-11 | Stop reason: HOSPADM

## 2020-11-10 RX ADMIN — REGADENOSON 0.4 MG: 0.08 INJECTION, SOLUTION INTRAVENOUS at 10:15

## 2020-11-10 RX ADMIN — TETROFOSMIN 12.45 MCI.: 1.38 INJECTION, POWDER, LYOPHILIZED, FOR SOLUTION INTRAVENOUS at 10:20

## 2020-11-10 RX ADMIN — TETROFOSMIN 3.96 MCI.: 1.38 INJECTION, POWDER, LYOPHILIZED, FOR SOLUTION INTRAVENOUS at 08:57

## 2020-11-10 ASSESSMENT — MIFFLIN-ST. JEOR: SCORE: 1927.79

## 2021-03-31 ENCOUNTER — APPOINTMENT (OUTPATIENT)
Dept: GENERAL RADIOLOGY | Facility: CLINIC | Age: 41
DRG: 177 | End: 2021-03-31
Attending: EMERGENCY MEDICINE
Payer: OTHER GOVERNMENT

## 2021-03-31 ENCOUNTER — NURSE TRIAGE (OUTPATIENT)
Dept: NURSING | Facility: CLINIC | Age: 41
End: 2021-03-31

## 2021-03-31 ENCOUNTER — HOSPITAL ENCOUNTER (INPATIENT)
Facility: CLINIC | Age: 41
LOS: 4 days | Discharge: HOME | DRG: 177 | End: 2021-04-04
Attending: EMERGENCY MEDICINE | Admitting: HOSPITALIST
Payer: OTHER GOVERNMENT

## 2021-03-31 DIAGNOSIS — R50.9 FEVER AND CHILLS: ICD-10-CM

## 2021-03-31 DIAGNOSIS — R06.00 DYSPNEA, UNSPECIFIED TYPE: ICD-10-CM

## 2021-03-31 DIAGNOSIS — U07.1 PNEUMONIA DUE TO 2019 NOVEL CORONAVIRUS: ICD-10-CM

## 2021-03-31 DIAGNOSIS — J12.82 PNEUMONIA DUE TO 2019 NOVEL CORONAVIRUS: ICD-10-CM

## 2021-03-31 LAB
ALBUMIN SERPL-MCNC: 3.3 G/DL (ref 3.4–5)
ALBUMIN UR-MCNC: 70 MG/DL
ALP SERPL-CCNC: 75 U/L (ref 40–150)
ALT SERPL W P-5'-P-CCNC: 42 U/L (ref 0–70)
ANION GAP SERPL CALCULATED.3IONS-SCNC: 6 MMOL/L (ref 3–14)
APPEARANCE UR: CLEAR
AST SERPL W P-5'-P-CCNC: 26 U/L (ref 0–45)
BASOPHILS # BLD AUTO: 0 10E9/L (ref 0–0.2)
BASOPHILS NFR BLD AUTO: 0.2 %
BILIRUB DIRECT SERPL-MCNC: <0.1 MG/DL (ref 0–0.2)
BILIRUB SERPL-MCNC: 0.4 MG/DL (ref 0.2–1.3)
BILIRUB UR QL STRIP: NEGATIVE
BUN SERPL-MCNC: 10 MG/DL (ref 7–30)
CALCIUM SERPL-MCNC: 8.2 MG/DL (ref 8.5–10.1)
CHLORIDE SERPL-SCNC: 111 MMOL/L (ref 94–109)
CO2 SERPL-SCNC: 25 MMOL/L (ref 20–32)
COLOR UR AUTO: YELLOW
CREAT SERPL-MCNC: 0.77 MG/DL (ref 0.66–1.25)
CRP SERPL-MCNC: 124 MG/L (ref 0–8)
D DIMER PPP FEU-MCNC: 0.4 UG/ML FEU (ref 0–0.5)
DIFFERENTIAL METHOD BLD: NORMAL
EOSINOPHIL # BLD AUTO: 0 10E9/L (ref 0–0.7)
EOSINOPHIL NFR BLD AUTO: 0.2 %
ERYTHROCYTE [DISTWIDTH] IN BLOOD BY AUTOMATED COUNT: 13 % (ref 10–15)
FIBRINOGEN PPP-MCNC: 686 MG/DL (ref 200–420)
GFR SERPL CREATININE-BSD FRML MDRD: >90 ML/MIN/{1.73_M2}
GLUCOSE SERPL-MCNC: 115 MG/DL (ref 70–99)
GLUCOSE UR STRIP-MCNC: NEGATIVE MG/DL
HCT VFR BLD AUTO: 41.7 % (ref 40–53)
HGB BLD-MCNC: 13.6 G/DL (ref 13.3–17.7)
HGB UR QL STRIP: NEGATIVE
IMM GRANULOCYTES # BLD: 0 10E9/L (ref 0–0.4)
IMM GRANULOCYTES NFR BLD: 0.3 %
INR PPP: 0.89 (ref 0.86–1.14)
INTERPRETATION ECG - MUSE: NORMAL
KETONES UR STRIP-MCNC: NEGATIVE MG/DL
LDH SERPL L TO P-CCNC: 369 U/L (ref 85–227)
LEUKOCYTE ESTERASE UR QL STRIP: NEGATIVE
LYMPHOCYTES # BLD AUTO: 1.3 10E9/L (ref 0.8–5.3)
LYMPHOCYTES NFR BLD AUTO: 21 %
MAGNESIUM SERPL-MCNC: 1.9 MG/DL (ref 1.6–2.3)
MCH RBC QN AUTO: 27.9 PG (ref 26.5–33)
MCHC RBC AUTO-ENTMCNC: 32.6 G/DL (ref 31.5–36.5)
MCV RBC AUTO: 86 FL (ref 78–100)
MONOCYTES # BLD AUTO: 0.2 10E9/L (ref 0–1.3)
MONOCYTES NFR BLD AUTO: 3.3 %
MUCOUS THREADS #/AREA URNS LPF: PRESENT /LPF
NEUTROPHILS # BLD AUTO: 4.6 10E9/L (ref 1.6–8.3)
NEUTROPHILS NFR BLD AUTO: 75 %
NITRATE UR QL: NEGATIVE
NRBC # BLD AUTO: 0 10*3/UL
NRBC BLD AUTO-RTO: 0 /100
PH UR STRIP: 6 PH (ref 5–7)
PLATELET # BLD AUTO: 171 10E9/L (ref 150–450)
POTASSIUM SERPL-SCNC: 3.7 MMOL/L (ref 3.4–5.3)
PROT SERPL-MCNC: 7.6 G/DL (ref 6.8–8.8)
RBC # BLD AUTO: 4.88 10E12/L (ref 4.4–5.9)
RBC #/AREA URNS AUTO: 1 /HPF (ref 0–2)
SODIUM SERPL-SCNC: 142 MMOL/L (ref 133–144)
SOURCE: ABNORMAL
SP GR UR STRIP: 1.03 (ref 1–1.03)
SQUAMOUS #/AREA URNS AUTO: 0 /HPF (ref 0–1)
TROPONIN I SERPL-MCNC: <0.015 UG/L (ref 0–0.04)
UROBILINOGEN UR STRIP-MCNC: 0 MG/DL (ref 0–2)
WBC # BLD AUTO: 6.1 10E9/L (ref 4–11)
WBC #/AREA URNS AUTO: 1 /HPF (ref 0–5)

## 2021-03-31 PROCEDURE — 96374 THER/PROPH/DIAG INJ IV PUSH: CPT

## 2021-03-31 PROCEDURE — 86140 C-REACTIVE PROTEIN: CPT | Performed by: EMERGENCY MEDICINE

## 2021-03-31 PROCEDURE — 80048 BASIC METABOLIC PNL TOTAL CA: CPT | Performed by: EMERGENCY MEDICINE

## 2021-03-31 PROCEDURE — 99285 EMERGENCY DEPT VISIT HI MDM: CPT | Mod: 25

## 2021-03-31 PROCEDURE — 80076 HEPATIC FUNCTION PANEL: CPT | Performed by: EMERGENCY MEDICINE

## 2021-03-31 PROCEDURE — 85025 COMPLETE CBC W/AUTO DIFF WBC: CPT | Performed by: EMERGENCY MEDICINE

## 2021-03-31 PROCEDURE — 81001 URINALYSIS AUTO W/SCOPE: CPT | Performed by: EMERGENCY MEDICINE

## 2021-03-31 PROCEDURE — 120N000004 HC R&B MS OVERFLOW

## 2021-03-31 PROCEDURE — 85379 FIBRIN DEGRADATION QUANT: CPT | Performed by: EMERGENCY MEDICINE

## 2021-03-31 PROCEDURE — 96361 HYDRATE IV INFUSION ADD-ON: CPT

## 2021-03-31 PROCEDURE — 93005 ELECTROCARDIOGRAM TRACING: CPT

## 2021-03-31 PROCEDURE — 84484 ASSAY OF TROPONIN QUANT: CPT | Performed by: EMERGENCY MEDICINE

## 2021-03-31 PROCEDURE — 250N000013 HC RX MED GY IP 250 OP 250 PS 637: Performed by: HOSPITALIST

## 2021-03-31 PROCEDURE — 99222 1ST HOSP IP/OBS MODERATE 55: CPT | Mod: AI | Performed by: HOSPITALIST

## 2021-03-31 PROCEDURE — 83615 LACTATE (LD) (LDH) ENZYME: CPT | Performed by: EMERGENCY MEDICINE

## 2021-03-31 PROCEDURE — 250N000011 HC RX IP 250 OP 636: Performed by: EMERGENCY MEDICINE

## 2021-03-31 PROCEDURE — XW033E5 INTRODUCTION OF REMDESIVIR ANTI-INFECTIVE INTO PERIPHERAL VEIN, PERCUTANEOUS APPROACH, NEW TECHNOLOGY GROUP 5: ICD-10-PCS | Performed by: INTERNAL MEDICINE

## 2021-03-31 PROCEDURE — 85730 THROMBOPLASTIN TIME PARTIAL: CPT | Performed by: HOSPITALIST

## 2021-03-31 PROCEDURE — 96375 TX/PRO/DX INJ NEW DRUG ADDON: CPT

## 2021-03-31 PROCEDURE — 85384 FIBRINOGEN ACTIVITY: CPT | Performed by: EMERGENCY MEDICINE

## 2021-03-31 PROCEDURE — 36415 COLL VENOUS BLD VENIPUNCTURE: CPT | Performed by: HOSPITALIST

## 2021-03-31 PROCEDURE — 71045 X-RAY EXAM CHEST 1 VIEW: CPT

## 2021-03-31 PROCEDURE — G0378 HOSPITAL OBSERVATION PER HR: HCPCS

## 2021-03-31 PROCEDURE — 83735 ASSAY OF MAGNESIUM: CPT | Performed by: EMERGENCY MEDICINE

## 2021-03-31 PROCEDURE — 85610 PROTHROMBIN TIME: CPT | Performed by: EMERGENCY MEDICINE

## 2021-03-31 PROCEDURE — 258N000003 HC RX IP 258 OP 636: Performed by: HOSPITALIST

## 2021-03-31 PROCEDURE — 250N000011 HC RX IP 250 OP 636: Performed by: HOSPITALIST

## 2021-03-31 PROCEDURE — 258N000003 HC RX IP 258 OP 636: Performed by: EMERGENCY MEDICINE

## 2021-03-31 RX ORDER — KETOROLAC TROMETHAMINE 15 MG/ML
15 INJECTION, SOLUTION INTRAMUSCULAR; INTRAVENOUS ONCE
Status: COMPLETED | OUTPATIENT
Start: 2021-03-31 | End: 2021-03-31

## 2021-03-31 RX ORDER — ONDANSETRON 4 MG/1
4 TABLET, ORALLY DISINTEGRATING ORAL EVERY 6 HOURS PRN
Status: DISCONTINUED | OUTPATIENT
Start: 2021-03-31 | End: 2021-03-31

## 2021-03-31 RX ORDER — ONDANSETRON 2 MG/ML
4 INJECTION INTRAMUSCULAR; INTRAVENOUS EVERY 6 HOURS PRN
Status: DISCONTINUED | OUTPATIENT
Start: 2021-03-31 | End: 2021-03-31

## 2021-03-31 RX ORDER — LORAZEPAM 0.5 MG/1
0.25 TABLET ORAL DAILY
Status: DISCONTINUED | OUTPATIENT
Start: 2021-04-01 | End: 2021-04-04 | Stop reason: HOSPADM

## 2021-03-31 RX ORDER — PROCHLORPERAZINE MALEATE 10 MG
10 TABLET ORAL EVERY 6 HOURS PRN
Status: DISCONTINUED | OUTPATIENT
Start: 2021-03-31 | End: 2021-04-04 | Stop reason: HOSPADM

## 2021-03-31 RX ORDER — PROCHLORPERAZINE 25 MG
25 SUPPOSITORY, RECTAL RECTAL EVERY 12 HOURS PRN
Status: DISCONTINUED | OUTPATIENT
Start: 2021-03-31 | End: 2021-04-04 | Stop reason: HOSPADM

## 2021-03-31 RX ORDER — PAROXETINE 30 MG/1
30 TABLET, FILM COATED ORAL AT BEDTIME
Status: DISCONTINUED | OUTPATIENT
Start: 2021-03-31 | End: 2021-04-04 | Stop reason: HOSPADM

## 2021-03-31 RX ORDER — LORAZEPAM 0.5 MG/1
0.25 TABLET ORAL DAILY
COMMUNITY
End: 2022-05-21

## 2021-03-31 RX ORDER — SODIUM CHLORIDE 9 MG/ML
INJECTION, SOLUTION INTRAVENOUS CONTINUOUS
Status: DISCONTINUED | OUTPATIENT
Start: 2021-03-31 | End: 2021-03-31

## 2021-03-31 RX ORDER — PAROXETINE 30 MG/1
30 TABLET, FILM COATED ORAL AT BEDTIME
COMMUNITY

## 2021-03-31 RX ORDER — LIDOCAINE 40 MG/G
CREAM TOPICAL
Status: DISCONTINUED | OUTPATIENT
Start: 2021-03-31 | End: 2021-04-04 | Stop reason: HOSPADM

## 2021-03-31 RX ORDER — SODIUM CHLORIDE 9 MG/ML
INJECTION, SOLUTION INTRAVENOUS CONTINUOUS
Status: DISCONTINUED | OUTPATIENT
Start: 2021-03-31 | End: 2021-04-01

## 2021-03-31 RX ORDER — DEXAMETHASONE SODIUM PHOSPHATE 10 MG/ML
6 INJECTION, SOLUTION INTRAMUSCULAR; INTRAVENOUS ONCE
Status: COMPLETED | OUTPATIENT
Start: 2021-03-31 | End: 2021-03-31

## 2021-03-31 RX ADMIN — SODIUM CHLORIDE, PRESERVATIVE FREE: 5 INJECTION INTRAVENOUS at 23:30

## 2021-03-31 RX ADMIN — KETOROLAC TROMETHAMINE 15 MG: 15 INJECTION, SOLUTION INTRAMUSCULAR; INTRAVENOUS at 20:36

## 2021-03-31 RX ADMIN — PAROXETINE HYDROCHLORIDE 30 MG: 30 TABLET, FILM COATED ORAL at 23:31

## 2021-03-31 RX ADMIN — ENOXAPARIN SODIUM 40 MG: 40 INJECTION SUBCUTANEOUS at 23:31

## 2021-03-31 RX ADMIN — DEXAMETHASONE SODIUM PHOSPHATE 6 MG: 10 INJECTION, SOLUTION INTRAMUSCULAR; INTRAVENOUS at 21:43

## 2021-03-31 RX ADMIN — SODIUM CHLORIDE 1000 ML: 9 INJECTION, SOLUTION INTRAVENOUS at 20:36

## 2021-03-31 SDOH — HEALTH STABILITY: MENTAL HEALTH: HOW OFTEN DO YOU HAVE A DRINK CONTAINING ALCOHOL?: NOT ASKED

## 2021-03-31 SDOH — HEALTH STABILITY: MENTAL HEALTH: HOW OFTEN DO YOU HAVE 6 OR MORE DRINKS ON ONE OCCASION?: NOT ASKED

## 2021-03-31 SDOH — HEALTH STABILITY: MENTAL HEALTH: HOW MANY STANDARD DRINKS CONTAINING ALCOHOL DO YOU HAVE ON A TYPICAL DAY?: NOT ASKED

## 2021-03-31 ASSESSMENT — ACTIVITIES OF DAILY LIVING (ADL)
DIFFICULTY_COMMUNICATING: NO
DIFFICULTY_EATING/SWALLOWING: NO
CONCENTRATING,_REMEMBERING_OR_MAKING_DECISIONS_DIFFICULTY: NO
TOILETING_ISSUES: NO
HEARING_DIFFICULTY_OR_DEAF: NO
WEAR_GLASSES_OR_BLIND: NO
WALKING_OR_CLIMBING_STAIRS_DIFFICULTY: NO
DOING_ERRANDS_INDEPENDENTLY_DIFFICULTY: NO
DRESSING/BATHING_DIFFICULTY: NO
FALL_HISTORY_WITHIN_LAST_SIX_MONTHS: NO
PATIENT_/_FAMILY_COMMUNICATION_STYLE: SPOKEN LANGUAGE (ENGLISH OR BILINGUAL)

## 2021-03-31 ASSESSMENT — ENCOUNTER SYMPTOMS
COUGH: 1
DIARRHEA: 0
NAUSEA: 0
MYALGIAS: 1
SHORTNESS OF BREATH: 1
FEVER: 1
VOMITING: 0

## 2021-03-31 ASSESSMENT — MIFFLIN-ST. JEOR
SCORE: 1912.36
SCORE: 1882.88

## 2021-03-31 NOTE — ED PROVIDER NOTES
"  History   Chief Complaint:  Shortness of Breath and Fever       HPI   Carlos A Jalloh is a 40 year old male with history of recently diagnosed COVID-19 who presents for evaluation of shortness of breath and a fever. On 3/23/2021 the patient started to develop a fever, cough, shortness of breath, and body aches. On 3/26 he tested positive for COVID-19 through his [facility] clinic. Since then his shortness of breath has continued to worsen, and EMS was called to bring him into the ED for evaluation. He denies any recent nausea, vomiting, or diarrhea.     Review of Systems   Constitutional: Positive for fever.   Respiratory: Positive for cough and shortness of breath.    Gastrointestinal: Negative for diarrhea, nausea and vomiting.   Musculoskeletal: Positive for myalgias.   All other systems reviewed and are negative.    Allergies:  No known drug allergies     Medications:  Ativan   Paxil     Past Medical History:    COVID-19    Anxiety  Diabetes     Social History:  The patient presents to the ED alone.     Physical Exam     Patient Vitals for the past 24 hrs:   BP Temp Temp src Pulse Resp SpO2 Height Weight   03/31/21 2000 132/73 -- -- 88 -- 95 % -- --   03/31/21 1930 129/67 -- -- 91 -- 94 % -- --   03/31/21 1900 130/81 -- -- 93 -- 94 % -- --   03/31/21 1825 -- -- -- 97 -- -- -- --   03/31/21 1824 138/83 101.6  F (38.7  C) Oral -- 20 92 % 1.803 m (5' 11\") 97.5 kg (215 lb)       Physical Exam  General: Sitting up in bed  Eyes:  The pupils are equal and round    Conjunctivae and sclerae are normal  ENT:    Wearing a mask  Neck:  Normal range of motion  CV:  Regular rate, regular rhythm     Skin warm and well perfused   Resp:  Non labored breathing on room air    No tachypnea    No cough heard  GI:  Abdomen is soft, there is no rigidity    No distension    No rebound tenderness     No abdominal tenderness  MS:  Normal muscular tone  Skin:  No rash or acute skin lesions noted  Neuro:   Awake, alert.      Speech is normal and " fluent.    Face is symmetric.     Moves all extremities equally  Psych: Normal affect.  Appropriate interactions.    Emergency Department Course   ECG  ECG taken at 19:06:45, ECG read at 1920  Normal sinus rhythm, Left bundle branch block, Abnormal ECG   Rate 90 bpm. DE interval 140 ms. QRS duration 134 ms. QT/QTc 432/528 ms. P-R-T axes 62 81 -37.     Imaging:  XR Chest Port:  IMPRESSION: Multifocal patchy airspace opacities worst in the mid lungs bilaterally consistent with pneumonia. No pleural effusion or pneumothorax. Normal heart size.   Per radiology.      Laboratory:   CBC: WBC 6.1, HGB 13.6,    BMP: Chloride 111 high, Glucose 115 high, Calcium 8.2 low, o/w WNL (Creatinine 0.77)    Hepatic panel: Albumin 3.3 low, o/w WNL   Magnesium: 1.9   Troponin (Collected 1823): <0.015   D dimer quantitative: 0.4   UA with Microscopic: Pending      Emergency Department Course:  Reviewed:  I reviewed nursing notes, vitals and past medical history    Assessments:  1846: I obtained history and examined the patient as noted above.     2004: I updated and reassessed the patient.     Consults:   2041: I spoke with Dr. Byrd of the hospitalist service regarding patient's presentation, findings, and plan of care.     Interventions:  2036 NS 1,000 mL IV   2036 Toradol 15 mg IV     Disposition:  The patient was admitted to the hospital under the care of Dr. Byrd.     Impression & Plan     Medical Decision Making:  Carlos A Connor is a 40 year old female who presented to the ED with shortness of breath and fever. Patient with known covid. Not hypoxic at rest but drops quickly to 90% with minimal movement in bed and becomes tachypneic. Xray chest shows covid pneumonia. Doubt PE, myocarditis or bacterial pneumonia. Doubt other cause of fever. Will admit to hospital and discussed with hospitalist.    Covid-19  Carlos A Jalloh was evaluated during a global COVID-19 pandemic, which necessitated consideration that the patient  might be at risk for infection with the SARS-CoV-2 virus that causes COVID-19.   Applicable protocols for evaluation were followed during the patient's care.   COVID-19 was considered as part of the patient's evaluation. The plan for testing is:  a test was obtained at a previous visit and reviewed & considered today.     Diagnosis:    ICD-10-CM    1. Pneumonia due to 2019 novel coronavirus  U07.1     J12.82    2. Dyspnea, unspecified type  R06.00    3. Fever and chills  R50.9         Scribe Disclosure:  I, Jerome Xie, am serving as a scribe at 6:46 PM on 3/31/2021 to document services personally performed by Dr. Bravo, based on my observations and the provider's statements to me.           Basia Bravo MD  04/01/21 0146

## 2021-04-01 LAB
ANION GAP SERPL CALCULATED.3IONS-SCNC: 6 MMOL/L (ref 3–14)
APTT PPP: 44 SEC (ref 22–37)
BUN SERPL-MCNC: 11 MG/DL (ref 7–30)
CALCIUM SERPL-MCNC: 8.2 MG/DL (ref 8.5–10.1)
CHLORIDE SERPL-SCNC: 111 MMOL/L (ref 94–109)
CO2 SERPL-SCNC: 24 MMOL/L (ref 20–32)
CREAT SERPL-MCNC: 0.67 MG/DL (ref 0.66–1.25)
CRP SERPL-MCNC: 131 MG/L (ref 0–8)
D DIMER PPP FEU-MCNC: <0.3 UG/ML FEU (ref 0–0.5)
ERYTHROCYTE [DISTWIDTH] IN BLOOD BY AUTOMATED COUNT: 13 % (ref 10–15)
FIBRINOGEN PPP-MCNC: 595 MG/DL (ref 200–420)
GFR SERPL CREATININE-BSD FRML MDRD: >90 ML/MIN/{1.73_M2}
GLUCOSE BLDC GLUCOMTR-MCNC: 149 MG/DL (ref 70–99)
GLUCOSE BLDC GLUCOMTR-MCNC: 153 MG/DL (ref 70–99)
GLUCOSE BLDC GLUCOMTR-MCNC: 172 MG/DL (ref 70–99)
GLUCOSE BLDC GLUCOMTR-MCNC: 225 MG/DL (ref 70–99)
GLUCOSE SERPL-MCNC: 209 MG/DL (ref 70–99)
HBA1C MFR BLD: 5.8 % (ref 0–5.6)
HCT VFR BLD AUTO: 38.7 % (ref 40–53)
HGB BLD-MCNC: 12.8 G/DL (ref 13.3–17.7)
MCH RBC QN AUTO: 28.3 PG (ref 26.5–33)
MCHC RBC AUTO-ENTMCNC: 33.1 G/DL (ref 31.5–36.5)
MCV RBC AUTO: 86 FL (ref 78–100)
PLATELET # BLD AUTO: 182 10E9/L (ref 150–450)
POTASSIUM SERPL-SCNC: 3.9 MMOL/L (ref 3.4–5.3)
RBC # BLD AUTO: 4.52 10E12/L (ref 4.4–5.9)
SODIUM SERPL-SCNC: 141 MMOL/L (ref 133–144)
WBC # BLD AUTO: 6.4 10E9/L (ref 4–11)

## 2021-04-01 PROCEDURE — 250N000012 HC RX MED GY IP 250 OP 636 PS 637: Performed by: HOSPITALIST

## 2021-04-01 PROCEDURE — 120N000004 HC R&B MS OVERFLOW

## 2021-04-01 PROCEDURE — 80048 BASIC METABOLIC PNL TOTAL CA: CPT | Performed by: HOSPITALIST

## 2021-04-01 PROCEDURE — 36415 COLL VENOUS BLD VENIPUNCTURE: CPT | Performed by: HOSPITALIST

## 2021-04-01 PROCEDURE — 85384 FIBRINOGEN ACTIVITY: CPT | Performed by: HOSPITALIST

## 2021-04-01 PROCEDURE — 99232 SBSQ HOSP IP/OBS MODERATE 35: CPT | Performed by: INTERNAL MEDICINE

## 2021-04-01 PROCEDURE — 250N000009 HC RX 250: Performed by: HOSPITALIST

## 2021-04-01 PROCEDURE — 250N000013 HC RX MED GY IP 250 OP 250 PS 637: Performed by: HOSPITALIST

## 2021-04-01 PROCEDURE — 83036 HEMOGLOBIN GLYCOSYLATED A1C: CPT | Performed by: HOSPITALIST

## 2021-04-01 PROCEDURE — 999N001017 HC STATISTIC GLUCOSE BY METER IP

## 2021-04-01 PROCEDURE — 86140 C-REACTIVE PROTEIN: CPT | Performed by: HOSPITALIST

## 2021-04-01 PROCEDURE — 85027 COMPLETE CBC AUTOMATED: CPT | Performed by: HOSPITALIST

## 2021-04-01 PROCEDURE — 250N000013 HC RX MED GY IP 250 OP 250 PS 637: Performed by: INTERNAL MEDICINE

## 2021-04-01 PROCEDURE — 250N000012 HC RX MED GY IP 250 OP 636 PS 637: Performed by: INTERNAL MEDICINE

## 2021-04-01 PROCEDURE — 258N000003 HC RX IP 258 OP 636: Performed by: HOSPITALIST

## 2021-04-01 PROCEDURE — 85379 FIBRIN DEGRADATION QUANT: CPT | Performed by: HOSPITALIST

## 2021-04-01 RX ORDER — ACETAMINOPHEN 500 MG
1000 TABLET ORAL EVERY 6 HOURS PRN
Status: DISCONTINUED | OUTPATIENT
Start: 2021-04-01 | End: 2021-04-04 | Stop reason: HOSPADM

## 2021-04-01 RX ORDER — MORPHINE SULFATE 2 MG/ML
1 INJECTION, SOLUTION INTRAMUSCULAR; INTRAVENOUS
Status: DISCONTINUED | OUTPATIENT
Start: 2021-04-01 | End: 2021-04-04 | Stop reason: HOSPADM

## 2021-04-01 RX ORDER — NALOXONE HYDROCHLORIDE 0.4 MG/ML
0.4 INJECTION, SOLUTION INTRAMUSCULAR; INTRAVENOUS; SUBCUTANEOUS
Status: DISCONTINUED | OUTPATIENT
Start: 2021-04-01 | End: 2021-04-04 | Stop reason: HOSPADM

## 2021-04-01 RX ORDER — OXYCODONE HYDROCHLORIDE 5 MG/1
5-10 TABLET ORAL EVERY 4 HOURS PRN
Status: DISCONTINUED | OUTPATIENT
Start: 2021-04-01 | End: 2021-04-04 | Stop reason: HOSPADM

## 2021-04-01 RX ORDER — NALOXONE HYDROCHLORIDE 0.4 MG/ML
0.2 INJECTION, SOLUTION INTRAMUSCULAR; INTRAVENOUS; SUBCUTANEOUS
Status: DISCONTINUED | OUTPATIENT
Start: 2021-04-01 | End: 2021-04-04 | Stop reason: HOSPADM

## 2021-04-01 RX ORDER — DEXTROSE MONOHYDRATE 25 G/50ML
25-50 INJECTION, SOLUTION INTRAVENOUS
Status: DISCONTINUED | OUTPATIENT
Start: 2021-04-01 | End: 2021-04-04 | Stop reason: HOSPADM

## 2021-04-01 RX ORDER — NICOTINE POLACRILEX 4 MG
15-30 LOZENGE BUCCAL
Status: DISCONTINUED | OUTPATIENT
Start: 2021-04-01 | End: 2021-04-04 | Stop reason: HOSPADM

## 2021-04-01 RX ADMIN — LORAZEPAM 0.25 MG: 0.5 TABLET ORAL at 08:42

## 2021-04-01 RX ADMIN — SODIUM CHLORIDE, PRESERVATIVE FREE: 5 INJECTION INTRAVENOUS at 14:13

## 2021-04-01 RX ADMIN — DEXAMETHASONE 6 MG: 2 TABLET ORAL at 12:38

## 2021-04-01 RX ADMIN — SODIUM CHLORIDE, PRESERVATIVE FREE: 5 INJECTION INTRAVENOUS at 14:47

## 2021-04-01 RX ADMIN — ACETAMINOPHEN 1000 MG: 500 TABLET, FILM COATED ORAL at 09:51

## 2021-04-01 RX ADMIN — OXYCODONE HYDROCHLORIDE 5 MG: 5 TABLET ORAL at 12:38

## 2021-04-01 RX ADMIN — REMDESIVIR 200 MG: 100 INJECTION, POWDER, LYOPHILIZED, FOR SOLUTION INTRAVENOUS at 00:02

## 2021-04-01 RX ADMIN — INSULIN ASPART 1 UNITS: 100 INJECTION, SOLUTION INTRAVENOUS; SUBCUTANEOUS at 09:51

## 2021-04-01 RX ADMIN — INSULIN ASPART 1 UNITS: 100 INJECTION, SOLUTION INTRAVENOUS; SUBCUTANEOUS at 12:37

## 2021-04-01 RX ADMIN — PAROXETINE HYDROCHLORIDE 30 MG: 30 TABLET, FILM COATED ORAL at 21:30

## 2021-04-01 RX ADMIN — INSULIN ASPART 1 UNITS: 100 INJECTION, SOLUTION INTRAVENOUS; SUBCUTANEOUS at 19:11

## 2021-04-01 RX ADMIN — SODIUM CHLORIDE 50 ML: 9 INJECTION, SOLUTION INTRAVENOUS at 00:00

## 2021-04-01 RX ADMIN — OXYCODONE HYDROCHLORIDE 5 MG: 5 TABLET ORAL at 21:30

## 2021-04-01 ASSESSMENT — ACTIVITIES OF DAILY LIVING (ADL)
ADLS_ACUITY_SCORE: 14

## 2021-04-01 NOTE — H&P
[facility]    History and Physical  Hospitalist       Date of Admission:  3/31/2021    Assessment & Plan   Carlos A Connor is a 40 year old male with a history of anxiety who presented to the ER with worsening shortness of breath and fever.  After evaluation in the ER, it was felt that he should be admitted to the hospitalist service for further evaluation and management.    COVID-19 infection    Symptoms began on March 23.  COVID test was positive on March 26.  Symptoms have been gradually progressing, quite a bit worse today.    Admit to inpatient.    Started on dexamethasone in the ER, will continue the same to complete a 10-day course.    Given his low O2 sats and abnormal imaging, he meets criteria for remdesivir and this will be initiated this evening.    Continuous pulse oximetry.    He appears dehydrated and has not been drinking/eating well, will continue IV fluids overnight, reassess in a.m.    Add on COVID labs to his ER blood draw.    Anxiety    Continue PTA Paxil and lorazepam.    DVT Prophylaxis: Enoxaparin (Lovenox) SQ  Code Status: Full Code  Expected discharge: Admit to inpatient.  I expect that he will be in the hospital for at least 2 midnights.    Jr Byrd MD    Primary Care Physician   Physician No Ref-Primary    Chief Complaint   Worsening shortness of breath and fever in setting of known COVID-19 infection.    History is obtained from the patient    History of Present Illness   Carlos A Connor is a 40 year old male with a history of anxiety who presented to the ER with worsening shortness of breath and fever.  He started to feel ill on March 23 when he developed a cough, fever, myalgias and shortness of breath.  COVID-19 testing was positive on March 26.  His symptoms have gradually progressed to the point where he called EMS to bring him into the ER today for evaluation.    In the ER, he was evaluated by Dr. Basia Bravo.  Vitals showed a fever of  101.6, mild tachycardia, room air O2 sats in the low 90s, down to 90 with minimal activity.  He was noted to be short of breath on exam.  Chest x-ray showed bilateral patchy opacities.  EKG showed sinus rhythm with a left bundle branch block.  He was given Toradol and dexamethasone.  The hospitalist service was contacted to admit him for further evaluation and management.    He has had intermittent fevers, chills, and sweats.  Some ongoing myalgias.  Occasional pleuritic chest pain.  Denies abdominal pain, nausea, and diarrhea.  No urinary symptoms.  No neurologic symptoms.  He is not sure where he contracted COVID-19, but states multiple members of his family have had COVID-19 recently.    Past Medical History    I have reviewed this patient's medical history and updated it with pertinent information if needed.   Past Medical History:   Diagnosis Date     Anxiety      Past Surgical History   I have reviewed this patient's surgical history and updated it with pertinent information if needed.  History reviewed. No pertinent surgical history.    Prior to Admission Medications   Prior to Admission Medications   Prescriptions Last Dose Informant Patient Reported? Taking?   LORazepam (ATIVAN) 0.5 MG tablet 3/31/2021 at AM Self Yes Yes   Sig: Take 0.25 mg by mouth daily 1/2 x 0.5 mg tab   PARoxetine (PAXIL) 30 MG tablet 3/30/2021 at HS Self Yes Yes   Sig: Take 30 mg by mouth At Bedtime      Facility-Administered Medications: None     Allergies   No Known Allergies    Social History   I have reviewed this patient's social history and updated it with pertinent information if needed. Carlos A Crawfordbrandy Connor  reports that he has quit smoking. He has never used smokeless tobacco. He reports current alcohol use. He reports previous drug use. Drug: Cocaine.    Family History   I have reviewed this patient's family history and updated it with pertinent information if needed.   History reviewed. No pertinent family history.    Review  of Systems   The 10 point Review of Systems is negative other than noted in the HPI or here.    Physical Exam   Temp: 101.6  F (38.7  C) Temp src: Oral BP: 117/70 Pulse: 86   Resp: 20 SpO2: 97 % O2 Device: None (Room air)    Vital Signs with Ranges  Temp:  [101.6  F (38.7  C)] 101.6  F (38.7  C)  Pulse:  [86-97] 86  Resp:  [20] 20  BP: (117-138)/(67-83) 117/70  SpO2:  [92 %-97 %] 97 %  215 lbs 0 oz    Constitutional: awake, alert, cooperative, appears short of breath, laying in the ER bed  Eyes: sclera clear, conjunctiva normal  ENT: COVID-19 mask on  Respiratory: diminished at the bases, appears short of breath, increased work of breathing, no crackles or wheezing  Cardiovascular: regular rate and rhythm, normal S1 and S2, no murmur noted  GI: normal bowel sounds, soft, non-distended, non-tender  Skin: warm, dry  Musculoskeletal: no lower extremity pitting edema present  Neurologic: awake, alert, oriented to name, place and time, motor is 5 out of 5 bilaterally, sensory is intact    Data   Data reviewed today:  I personally reviewed the EKG tracing showing sinus rhythm with left bundle branch block and the chest x-ray image(s) showing bilateral patchy opacities.    Recent Labs   Lab 03/31/21  1823   WBC 6.1   HGB 13.6   MCV 86         POTASSIUM 3.7   CHLORIDE 111*   CO2 25   BUN 10   CR 0.77   ANIONGAP 6   HORTENSIA 8.2*   *   ALBUMIN 3.3*   PROTTOTAL 7.6   BILITOTAL 0.4   ALKPHOS 75   ALT 42   AST 26   TROPI <0.015     Recent Results (from the past 24 hour(s))   XR Chest Port 1 View    Narrative    XR CHEST PORT 1 VW 3/31/2021 7:15 PM    HISTORY: fever, cough. covid +    COMPARISON: None.      Impression    IMPRESSION: Multifocal patchy airspace opacities worst in the mid  lungs bilaterally consistent with pneumonia. No pleural effusion or  pneumothorax. Normal heart size.    ROMAINE CHACON MD

## 2021-04-01 NOTE — PROGRESS NOTES
Home medications locked up in discharge med bin. Green sticker placed on patient's chart. Patient understandable.

## 2021-04-01 NOTE — PROGRESS NOTES
[facility]    Medicine Progress Note - Hospitalist Service       Date of Admission:  3/31/2021  Assessment & Plan       Carlos A Connor is a 40 year old male with a history of anxiety who presented to the ER with worsening shortness of breath and fever.  After evaluation in the ER, it was felt that he should be admitted to the hospitalist service for further evaluation and management.     COVID-19 infection    Symptoms began on March 23.  COVID test was positive on March 26.  Symptoms have been gradually progressing    Started on dexamethasone during admission, will continue the same to complete a 10-day course.    Given his low O2 sats and abnormal imaging, remdesivir initiated on admission    Continuous pulse oximetry.    He appears dehydrated and has not been drinking/eating well, will continue IV fluids overnight, reassess in a.m.    Follow Covid specific lab    Lovenox for DVT prophylaxis    Incentive spirometer, early ambulation    Pain control with pain meds for myalgia     Anxiety    Continue PTA Paxil and lorazepam.     Prediabetes  -Hemoglobin A1c 5.8.  Blood sugar elevated likely from being on steroids  -Cover with sliding scale insulin     Diet: Combination Diet Regular Diet Adult    DVT Prophylaxis: Enoxaparin (Lovenox) SQ  Melara Catheter: not present  Code Status: Full Code           Disposition Plan   Expected discharge: 2 - 3 days, recommended to prior living arrangement once Continued clinical improvement.  Entered: Laura Baxter MD 04/01/2021, 4:02 PM       The patient's care was discussed with the Bedside Nurse and Patient.    Laura Baxter MD  Hospitalist Service  [facility]  Contact information available via [facility] Paging/Directory    ______________________________________________________________________    Interval History   Patient reports generalized body aches, headache not improved with Tylenol.  He reports his cough is better,  shortness of breath is better.    Data reviewed today: I reviewed all medications, new labs and imaging results over the last 24 hours.     Physical Exam   Vital Signs: Temp: 98.8  F (37.1  C) Temp src: Oral BP: 135/71 Pulse: 85   Resp: 16 SpO2: 91 % O2 Device: None (Room air)    Weight: 208 lbs 8 oz  Exam:  Constitutional: Awake, alert and no distress. Appears uncomfortable due to generalized body aches  Head: Normocephalic. No masses, lesions, tenderness or abnormalities  ENT: ENT exam normal, no neck nodes or sinus tenderness  Cardiovascular: RRR.  No murmurs, no rubs or JVD  Respiratory: Normal WOB,b/l equal air entry, no wheezes or crackles   Gastrointestinal: Abdomen soft, non-tender. BS normal. No masses, organomegaly  : Deferred  Extremities : No edema , no clubbing or cyanosis    Neurologic: Cranial nerves II-XII grossly intact , power symmetrical, Reflexes normal and symmetric. Sensation grossly WNL.      Data   Recent Labs   Lab 04/01/21  0606 03/31/21  1823   WBC 6.4 6.1   HGB 12.8* 13.6   MCV 86 86    171   INR  --  0.89    142   POTASSIUM 3.9 3.7   CHLORIDE 111* 111*   CO2 24 25   BUN 11 10   CR 0.67 0.77   ANIONGAP 6 6   HORTENSIA 8.2* 8.2*   * 115*   ALBUMIN  --  3.3*   PROTTOTAL  --  7.6   BILITOTAL  --  0.4   ALKPHOS  --  75   ALT  --  42   AST  --  26   TROPI  --  <0.015     Recent Results (from the past 24 hour(s))   XR Chest Port 1 View    Narrative    XR CHEST PORT 1 VW 3/31/2021 7:15 PM    HISTORY: fever, cough. covid +    COMPARISON: None.      Impression    IMPRESSION: Multifocal patchy airspace opacities worst in the mid  lungs bilaterally consistent with pneumonia. No pleural effusion or  pneumothorax. Normal heart size.    ROMAINE CHACON MD     Medications     - MEDICATION INSTRUCTIONS -         remdesivir  100 mg Intravenous Q24H    And     sodium chloride 0.9%  50 mL Intravenous Q24H     dexamethasone  6 mg Oral Daily     enoxaparin ANTICOAGULANT  40 mg Subcutaneous Q24H      insulin aspart  1-7 Units Subcutaneous TID AC     insulin aspart  1-5 Units Subcutaneous At Bedtime     LORazepam  0.25 mg Oral Daily     PARoxetine  30 mg Oral At Bedtime     sodium chloride (PF)  3 mL Intracatheter Q8H

## 2021-04-01 NOTE — PROGRESS NOTES
Care Coordination:    -Noted pt has no insurance listed on his face sheet. Per account notes pt states he has [place] however [place].  Referral placed to the financial counselors to assist pt with any insurance questions and to see if he qualifies for any programs available.       Brenna Klein RN, BAN  Inpatient Care Coordination  [address] [address]  [email or web address]   Office: [phone]  Fax: [phone]

## 2021-04-01 NOTE — PLAN OF CARE
COVID POSITIVE; precautions maintained. AOx4. VSS; RA. Regular diet. Independent. Continent. +BS. IV; NS @ 75ml/hr. Back pain; treated with hot pack. Skin intact. First dose of Remdesivir. Discharge pending; continue to monitor.

## 2021-04-01 NOTE — PLAN OF CARE
A and O x4. Independent in room. Covid positive patient, special precautions maintained. Continent of bowel and bladder. NS @ 75mL/hr. Pt c/o back pain and a headache. Tylenol given x1, and 5mg Oxycodone given x 1. Discharge pending, continue to monitor O2 sats.

## 2021-04-01 NOTE — PROGRESS NOTES
RECEIVING UNIT ED HANDOFF REVIEW    ED Nurse Handoff Report was reviewed by: Erika Dixon RN on March 31, 2021 at 10:02 PM

## 2021-04-01 NOTE — PROVIDER NOTIFICATION
"\"Obs 4 S.T. hx of diabetes listed in chart.  at 0200 and 149 at 0800. Do you want to order sliding scale?\"  "

## 2021-04-01 NOTE — ED NOTES
[facility]  ED Nurse Handoff Report    ED Chief complaint: Covid Concern      ED Diagnosis:   Final diagnoses:   Pneumonia due to 2019 novel coronavirus   Dyspnea, unspecified type   Fever and chills       Code Status: Full Code    Allergies: Not on File    Patient Story: Pt tested positive for COVID Friday, 8 members of household positive for COVID. Pt reports increase in work of breathing, dyspnea that worsens with exertion, and fever.   Focused Assessment:  Febrile, dyspneic,tachycardic, mild cough, body aches, head ache    Treatments and/or interventions provided: IVF, toradol  Patient's response to treatments and/or interventions: Reduction in body aches    To be done/followed up on inpatient unit:  See inpatient orders    Does this patient have any cognitive concerns?: A&O    Activity level - Baseline/Home:  Independent  Activity Level - Current:   Stand with Assist    Patient's Preferred language: English   Needed?: No    Isolation: None and COVID r/o and special precautions  Infection: COVID r/o and special precautions  Patient tested for COVID 19 prior to admission: NO, previous positive test  Bariatric?: No    Vital Signs:   Vitals:    03/31/21 1900 03/31/21 1930 03/31/21 2000 03/31/21 2115   BP: 130/81 129/67 132/73 117/70   Pulse: 93 91 88 86   Resp:       Temp:       TempSrc:       SpO2: 94% 94% 95% 97%   Weight:       Height:           Cardiac Rhythm:     Was the PSS-3 completed:   Yes  What interventions are required if any?               Family Comments: No family mpresent  OBS brochure/video discussed/provided to patient/family: Yes             For the majority of the shift this patient's behavior was Green.   Behavioral interventions performed were None.    ED NURSE PHONE NUMBER: [phone]

## 2021-04-02 LAB
ANION GAP SERPL CALCULATED.3IONS-SCNC: 5 MMOL/L (ref 3–14)
BUN SERPL-MCNC: 12 MG/DL (ref 7–30)
CALCIUM SERPL-MCNC: 8.2 MG/DL (ref 8.5–10.1)
CHLORIDE SERPL-SCNC: 109 MMOL/L (ref 94–109)
CO2 SERPL-SCNC: 26 MMOL/L (ref 20–32)
CREAT SERPL-MCNC: 0.59 MG/DL (ref 0.66–1.25)
CRP SERPL-MCNC: 63.2 MG/L (ref 0–8)
D DIMER PPP FEU-MCNC: 0.4 UG/ML FEU (ref 0–0.5)
ERYTHROCYTE [DISTWIDTH] IN BLOOD BY AUTOMATED COUNT: 12.9 % (ref 10–15)
FIBRINOGEN PPP-MCNC: 642 MG/DL (ref 200–420)
GFR SERPL CREATININE-BSD FRML MDRD: >90 ML/MIN/{1.73_M2}
GLUCOSE BLDC GLUCOMTR-MCNC: 114 MG/DL (ref 70–99)
GLUCOSE BLDC GLUCOMTR-MCNC: 133 MG/DL (ref 70–99)
GLUCOSE BLDC GLUCOMTR-MCNC: 150 MG/DL (ref 70–99)
GLUCOSE BLDC GLUCOMTR-MCNC: 215 MG/DL (ref 70–99)
GLUCOSE SERPL-MCNC: 135 MG/DL (ref 70–99)
HCT VFR BLD AUTO: 38.2 % (ref 40–53)
HGB BLD-MCNC: 12.3 G/DL (ref 13.3–17.7)
MCH RBC QN AUTO: 27.6 PG (ref 26.5–33)
MCHC RBC AUTO-ENTMCNC: 32.2 G/DL (ref 31.5–36.5)
MCV RBC AUTO: 86 FL (ref 78–100)
PLATELET # BLD AUTO: 251 10E9/L (ref 150–450)
POTASSIUM SERPL-SCNC: 3.7 MMOL/L (ref 3.4–5.3)
RBC # BLD AUTO: 4.46 10E12/L (ref 4.4–5.9)
SODIUM SERPL-SCNC: 140 MMOL/L (ref 133–144)
WBC # BLD AUTO: 9 10E9/L (ref 4–11)

## 2021-04-02 PROCEDURE — 250N000011 HC RX IP 250 OP 636: Performed by: HOSPITALIST

## 2021-04-02 PROCEDURE — 80048 BASIC METABOLIC PNL TOTAL CA: CPT | Performed by: HOSPITALIST

## 2021-04-02 PROCEDURE — 999N001017 HC STATISTIC GLUCOSE BY METER IP

## 2021-04-02 PROCEDURE — 86140 C-REACTIVE PROTEIN: CPT | Performed by: HOSPITALIST

## 2021-04-02 PROCEDURE — 36415 COLL VENOUS BLD VENIPUNCTURE: CPT | Performed by: HOSPITALIST

## 2021-04-02 PROCEDURE — 250N000009 HC RX 250: Performed by: HOSPITALIST

## 2021-04-02 PROCEDURE — 250N000012 HC RX MED GY IP 250 OP 636 PS 637: Performed by: HOSPITALIST

## 2021-04-02 PROCEDURE — 85379 FIBRIN DEGRADATION QUANT: CPT | Performed by: HOSPITALIST

## 2021-04-02 PROCEDURE — 85027 COMPLETE CBC AUTOMATED: CPT | Performed by: HOSPITALIST

## 2021-04-02 PROCEDURE — 85384 FIBRINOGEN ACTIVITY: CPT | Performed by: HOSPITALIST

## 2021-04-02 PROCEDURE — 258N000003 HC RX IP 258 OP 636: Performed by: HOSPITALIST

## 2021-04-02 PROCEDURE — 99232 SBSQ HOSP IP/OBS MODERATE 35: CPT | Performed by: INTERNAL MEDICINE

## 2021-04-02 PROCEDURE — 120N000004 HC R&B MS OVERFLOW

## 2021-04-02 PROCEDURE — 250N000013 HC RX MED GY IP 250 OP 250 PS 637: Performed by: HOSPITALIST

## 2021-04-02 RX ADMIN — ENOXAPARIN SODIUM 40 MG: 40 INJECTION SUBCUTANEOUS at 21:34

## 2021-04-02 RX ADMIN — SODIUM CHLORIDE 50 ML: 9 INJECTION, SOLUTION INTRAVENOUS at 00:38

## 2021-04-02 RX ADMIN — REMDESIVIR 100 MG: 100 INJECTION, POWDER, LYOPHILIZED, FOR SOLUTION INTRAVENOUS at 00:08

## 2021-04-02 RX ADMIN — REMDESIVIR 100 MG: 100 INJECTION, POWDER, LYOPHILIZED, FOR SOLUTION INTRAVENOUS at 16:25

## 2021-04-02 RX ADMIN — LORAZEPAM 0.25 MG: 0.5 TABLET ORAL at 08:52

## 2021-04-02 RX ADMIN — INSULIN ASPART 1 UNITS: 100 INJECTION, SOLUTION INTRAVENOUS; SUBCUTANEOUS at 17:32

## 2021-04-02 RX ADMIN — SODIUM CHLORIDE 50 ML: 9 INJECTION, SOLUTION INTRAVENOUS at 16:27

## 2021-04-02 RX ADMIN — ENOXAPARIN SODIUM 40 MG: 40 INJECTION SUBCUTANEOUS at 00:09

## 2021-04-02 RX ADMIN — PAROXETINE HYDROCHLORIDE 30 MG: 30 TABLET, FILM COATED ORAL at 21:34

## 2021-04-02 RX ADMIN — DEXAMETHASONE 6 MG: 2 TABLET ORAL at 12:13

## 2021-04-02 ASSESSMENT — ACTIVITIES OF DAILY LIVING (ADL)
ADLS_ACUITY_SCORE: 14

## 2021-04-02 NOTE — PROGRESS NOTES
[facility]    Medicine Progress Note - Hospitalist Service       Date of Admission:  3/31/2021  Assessment & Plan       Carlos A Connor is a 40 year old male with a history of anxiety who presented to the ER with worsening shortness of breath and fever.  After evaluation in the ER, it was felt that he should be admitted to the hospitalist service for further evaluation and management.     COVID-19 infection    Symptoms began on March 23.  COVID test was positive on March 26.  Symptoms have been gradually progressing    Started on dexamethasone during admission, will continue the same to complete a 10-day course.    Given his low O2 sats and abnormal imaging, remdesivir initiated on admission    Continuous pulse oximetry.    Follow Covid specific lab    Lovenox for DVT prophylaxis    Incentive spirometer, early ambulation    Pain control with oxycodone/Tylenol for myalgia/headache     Anxiety    Continue PTA Paxil and lorazepam.     Prediabetes  -Hemoglobin A1c 5.8.  Blood sugar elevated likely from being on steroids  -Cover with sliding scale insulin     Diet: Combination Diet Regular Diet Adult    DVT Prophylaxis: Enoxaparin (Lovenox) SQ  Melara Catheter: not present  Code Status: Full Code           Disposition Plan   Expected discharge: 1 to 2 days, recommended to prior living arrangement once Continued clinical improvement.  Entered: Laura Baxter MD 04/02/2021, 3:29 PM       The patient's care was discussed with the Bedside Nurse and Patient.    Laura Baxter MD  Hospitalist Service  [facility]  Contact information available via [facility] Paging/Directory    ______________________________________________________________________    Interval History   Patient reports feeling better today.  Oxycodone seems to be helping his headache and back pain.  Some dyspnea and cough still persist.  No other nursing concerns.    Data reviewed today: I reviewed all medications,  new labs and imaging results over the last 24 hours.     Physical Exam   Vital Signs: Temp: 98.9  F (37.2  C) Temp src: Oral BP: 123/65 Pulse: 74   Resp: 18 SpO2: 93 % O2 Device: None (Room air)    Weight: 208 lbs 8 oz  Exam:  Constitutional: Awake, alert and no distress. Appears uncomfortable due to generalized body aches  Head: Normocephalic. No masses, lesions, tenderness or abnormalities  ENT: ENT exam normal, no neck nodes or sinus tenderness  Cardiovascular: RRR.  No murmurs, no rubs or JVD  Respiratory: Normal WOB,b/l equal air entry, no wheezes or crackles   Gastrointestinal: Abdomen soft, non-tender. BS normal. No masses, organomegaly  : Deferred  Extremities : No edema , no clubbing or cyanosis    Neurologic: Cranial nerves II-XII grossly intact , power symmetrical, Reflexes normal and symmetric. Sensation grossly WNL.      Data   Recent Labs   Lab 04/02/21  0711 04/01/21  0606 03/31/21  1823   WBC 9.0 6.4 6.1   HGB 12.3* 12.8* 13.6   MCV 86 86 86    182 171   INR  --   --  0.89    141 142   POTASSIUM 3.7 3.9 3.7   CHLORIDE 109 111* 111*   CO2 26 24 25   BUN 12 11 10   CR 0.59* 0.67 0.77   ANIONGAP 5 6 6   HORTENSIA 8.2* 8.2* 8.2*   * 209* 115*   ALBUMIN  --   --  3.3*   PROTTOTAL  --   --  7.6   BILITOTAL  --   --  0.4   ALKPHOS  --   --  75   ALT  --   --  42   AST  --   --  26   TROPI  --   --  <0.015     No results found for this or any previous visit (from the past 24 hour(s)).  Medications     - MEDICATION INSTRUCTIONS -         remdesivir  100 mg Intravenous Q24H    And     sodium chloride 0.9%  50 mL Intravenous Q24H     dexamethasone  6 mg Oral Daily     enoxaparin ANTICOAGULANT  40 mg Subcutaneous Q24H     insulin aspart  1-7 Units Subcutaneous TID AC     insulin aspart  1-5 Units Subcutaneous At Bedtime     LORazepam  0.25 mg Oral Daily     PARoxetine  30 mg Oral At Bedtime     sodium chloride (PF)  3 mL Intracatheter Q8H

## 2021-04-02 NOTE — PLAN OF CARE
COVID (+). A&Ox4. VSS on RA. GOODSON. regular diet, bg checks. Up independent. denied pain overnight, oxy and tylenol available. Completed 2nd dose of remdesivir. IV SL. Continue to monitor.

## 2021-04-02 NOTE — PLAN OF CARE
A&Ox4. VSS on RA. Denies pain. Tolerating diet, blood sugar 133, 114. Day 3 of Remdesivir. Ambulating ind.

## 2021-04-02 NOTE — PLAN OF CARE
COVID-19 Positive. Iso precautions maintained. Pt is A+O x 4. VSS on RA ex GOODSON amb to/from BR. Up IND. LS is clear, diminished. Infrequent cough. Accu checks, 1 unit Novolog coverage x 1.Tolerated diet. Given PRN Oxycodone for headache/back pain. Remains on Remdesivir (day 1), Decadron and Lovenox. Voiding.

## 2021-04-03 LAB
ANION GAP SERPL CALCULATED.3IONS-SCNC: 7 MMOL/L (ref 3–14)
BUN SERPL-MCNC: 18 MG/DL (ref 7–30)
CALCIUM SERPL-MCNC: 8.7 MG/DL (ref 8.5–10.1)
CHLORIDE SERPL-SCNC: 110 MMOL/L (ref 94–109)
CO2 SERPL-SCNC: 25 MMOL/L (ref 20–32)
CREAT SERPL-MCNC: 0.66 MG/DL (ref 0.66–1.25)
CRP SERPL-MCNC: 28.1 MG/L (ref 0–8)
D DIMER PPP FEU-MCNC: 0.5 UG/ML FEU (ref 0–0.5)
ERYTHROCYTE [DISTWIDTH] IN BLOOD BY AUTOMATED COUNT: 12.9 % (ref 10–15)
FIBRINOGEN PPP-MCNC: 581 MG/DL (ref 200–420)
GFR SERPL CREATININE-BSD FRML MDRD: >90 ML/MIN/{1.73_M2}
GLUCOSE BLDC GLUCOMTR-MCNC: 108 MG/DL (ref 70–99)
GLUCOSE BLDC GLUCOMTR-MCNC: 150 MG/DL (ref 70–99)
GLUCOSE BLDC GLUCOMTR-MCNC: 301 MG/DL (ref 70–99)
GLUCOSE SERPL-MCNC: 133 MG/DL (ref 70–99)
HCT VFR BLD AUTO: 39.5 % (ref 40–53)
HGB BLD-MCNC: 12.9 G/DL (ref 13.3–17.7)
MCH RBC QN AUTO: 28 PG (ref 26.5–33)
MCHC RBC AUTO-ENTMCNC: 32.7 G/DL (ref 31.5–36.5)
MCV RBC AUTO: 86 FL (ref 78–100)
PLATELET # BLD AUTO: 333 10E9/L (ref 150–450)
POTASSIUM SERPL-SCNC: 3.5 MMOL/L (ref 3.4–5.3)
RBC # BLD AUTO: 4.6 10E12/L (ref 4.4–5.9)
SODIUM SERPL-SCNC: 142 MMOL/L (ref 133–144)
WBC # BLD AUTO: 7.2 10E9/L (ref 4–11)

## 2021-04-03 PROCEDURE — 99232 SBSQ HOSP IP/OBS MODERATE 35: CPT | Performed by: INTERNAL MEDICINE

## 2021-04-03 PROCEDURE — 250N000012 HC RX MED GY IP 250 OP 636 PS 637: Performed by: HOSPITALIST

## 2021-04-03 PROCEDURE — 250N000009 HC RX 250: Performed by: HOSPITALIST

## 2021-04-03 PROCEDURE — 999N001017 HC STATISTIC GLUCOSE BY METER IP

## 2021-04-03 PROCEDURE — 85384 FIBRINOGEN ACTIVITY: CPT | Performed by: HOSPITALIST

## 2021-04-03 PROCEDURE — 258N000003 HC RX IP 258 OP 636: Performed by: HOSPITALIST

## 2021-04-03 PROCEDURE — 86140 C-REACTIVE PROTEIN: CPT | Performed by: HOSPITALIST

## 2021-04-03 PROCEDURE — 85379 FIBRIN DEGRADATION QUANT: CPT | Performed by: HOSPITALIST

## 2021-04-03 PROCEDURE — 120N000004 HC R&B MS OVERFLOW

## 2021-04-03 PROCEDURE — 250N000011 HC RX IP 250 OP 636: Performed by: HOSPITALIST

## 2021-04-03 PROCEDURE — 80048 BASIC METABOLIC PNL TOTAL CA: CPT | Performed by: HOSPITALIST

## 2021-04-03 PROCEDURE — 85027 COMPLETE CBC AUTOMATED: CPT | Performed by: HOSPITALIST

## 2021-04-03 PROCEDURE — 250N000013 HC RX MED GY IP 250 OP 250 PS 637: Performed by: HOSPITALIST

## 2021-04-03 PROCEDURE — 36415 COLL VENOUS BLD VENIPUNCTURE: CPT | Performed by: HOSPITALIST

## 2021-04-03 RX ADMIN — LORAZEPAM 0.25 MG: 0.5 TABLET ORAL at 09:02

## 2021-04-03 RX ADMIN — ENOXAPARIN SODIUM 40 MG: 40 INJECTION SUBCUTANEOUS at 21:19

## 2021-04-03 RX ADMIN — DEXAMETHASONE 6 MG: 2 TABLET ORAL at 13:59

## 2021-04-03 RX ADMIN — SODIUM CHLORIDE 50 ML: 9 INJECTION, SOLUTION INTRAVENOUS at 16:27

## 2021-04-03 RX ADMIN — REMDESIVIR 100 MG: 100 INJECTION, POWDER, LYOPHILIZED, FOR SOLUTION INTRAVENOUS at 16:26

## 2021-04-03 RX ADMIN — PAROXETINE HYDROCHLORIDE 30 MG: 30 TABLET, FILM COATED ORAL at 21:19

## 2021-04-03 ASSESSMENT — ACTIVITIES OF DAILY LIVING (ADL)
ADLS_ACUITY_SCORE: 16
ADLS_ACUITY_SCORE: 14

## 2021-04-03 NOTE — PLAN OF CARE
Covid positive, Special precaution maintained. A&O X4. VSS on RA. BG @0200 was 150. Up independent. Regular diet. PIV SL. Denied pain/SOB/Nausea. Dry cough. Continue to monitor.

## 2021-04-03 NOTE — PLAN OF CARE
Inpatient. Covid precautions in place. AOX4. VSS on room air ex htn at times. Denies pain. C/O SOB with exertion. Up in room independently. Tolerating regular diet. BG checks 133 and did not order food for lunch. Intake and output adequate. Denies nausea or diarrhea. Denies coughing. PIV SL. Skin WDL. Continue remdesivir treatment and discharge tomorrow per MD.

## 2021-04-03 NOTE — PROGRESS NOTES
[facility]    Medicine Progress Note - Hospitalist Service       Date of Admission:  3/31/2021  Assessment & Plan       Carlos A Connor is a 40 year old male with a history of anxiety who presented to the ER with worsening shortness of breath and fever.  After evaluation in the ER, it was felt that he should be admitted to the hospitalist service for further evaluation and management.     COVID-19 infection    Symptoms began on March 23.  COVID test was positive on March 26.  Symptoms started to   gradually progress    Started on dexamethasone during admission, will continue the same to complete a 10-day course.    Given his low O2 sats and abnormal imaging, remdesivir initiated on admission    Continuous pulse oximetry.    Follow Covid specific lab, has been improving    Lovenox for DVT prophylaxis    Incentive spirometer, early ambulation    Pain control with oxycodone/Tylenol for myalgia/headache    -Continues to improve clinically     Anxiety    Continue PTA Paxil and lorazepam.    Anemia  Mild, outpatient follow-up     Prediabetes  -Hemoglobin A1c 5.8.  Blood sugar elevated likely from being on steroids  -Blood sugar over the last 24 hours better controlled with most of the readings less than 150  -Cover with sliding scale insulin     Diet: Combination Diet Regular Diet Adult    DVT Prophylaxis: Enoxaparin (Lovenox) SQ  Melara Catheter: not present  Code Status: Full Code           Disposition Plan   Expected discharge: Tomorrow, recommended to prior living arrangement once Continued clinical improvement.  Entered: Laura Baxter MD 04/03/2021, 3:35 PM       The patient's care was discussed with the Bedside Nurse and Patient.    Laura Baxter MD  Hospitalist Service  [facility]  Contact information available via [facility] Paging/Directory    ______________________________________________________________________    Interval History   Patient reports continued  improvement.  No new nursing concerns.    Data reviewed today: I reviewed all medications, new labs and imaging results over the last 24 hours.     Physical Exam   Vital Signs: Temp: 98.6  F (37  C) Temp src: Oral BP: 121/74 Pulse: 59   Resp: 16 SpO2: 91 % O2 Device: None (Room air)    Weight: 208 lbs 8 oz  Exam:  Constitutional: Awake, alert and no distress. Appears uncomfortable due to generalized body aches  Head: Normocephalic. No masses, lesions, tenderness or abnormalities  ENT: ENT exam normal, no neck nodes or sinus tenderness  Cardiovascular: RRR.  No murmurs, no rubs or JVD  Respiratory: Normal WOB,b/l equal air entry, no wheezes or crackles   Gastrointestinal: Abdomen soft, non-tender. BS normal. No masses, organomegaly  : Deferred  Extremities : No edema , no clubbing or cyanosis    Neurologic: Cranial nerves II-XII grossly intact , power symmetrical, Reflexes normal and symmetric. Sensation grossly WNL.      Data   Recent Labs   Lab 04/03/21  0628 04/02/21  0711 04/01/21  0606 03/31/21  1823   WBC 7.2 9.0 6.4 6.1   HGB 12.9* 12.3* 12.8* 13.6   MCV 86 86 86 86    251 182 171   INR  --   --   --  0.89    140 141 142   POTASSIUM 3.5 3.7 3.9 3.7   CHLORIDE 110* 109 111* 111*   CO2 25 26 24 25   BUN 18 12 11 10   CR 0.66 0.59* 0.67 0.77   ANIONGAP 7 5 6 6   HORTENSIA 8.7 8.2* 8.2* 8.2*   * 135* 209* 115*   ALBUMIN  --   --   --  3.3*   PROTTOTAL  --   --   --  7.6   BILITOTAL  --   --   --  0.4   ALKPHOS  --   --   --  75   ALT  --   --   --  42   AST  --   --   --  26   TROPI  --   --   --  <0.015     No results found for this or any previous visit (from the past 24 hour(s)).  Medications     - MEDICATION INSTRUCTIONS -         remdesivir  100 mg Intravenous Q24H    And     sodium chloride 0.9%  50 mL Intravenous Q24H     dexamethasone  6 mg Oral Daily     enoxaparin ANTICOAGULANT  40 mg Subcutaneous Q24H     insulin aspart  1-7 Units Subcutaneous TID AC     insulin aspart  1-5 Units  Subcutaneous At Bedtime     LORazepam  0.25 mg Oral Daily     PARoxetine  30 mg Oral At Bedtime     sodium chloride (PF)  3 mL Intracatheter Q8H

## 2021-04-03 NOTE — PLAN OF CARE
A&Ox4. VSS on RA. Up independently. Regular diet well tolerated. BG checks and coverage. Occasional headache and back pain managed with tylenol and oxycodone. Dry, non-productive cough. IS encouraged. IV SL. Remdesivir, decadron, and lovenox treatment.

## 2021-04-04 VITALS
HEART RATE: 57 BPM | DIASTOLIC BLOOD PRESSURE: 74 MMHG | TEMPERATURE: 98.2 F | SYSTOLIC BLOOD PRESSURE: 137 MMHG | RESPIRATION RATE: 16 BRPM | OXYGEN SATURATION: 94 % | BODY MASS INDEX: 29.19 KG/M2 | HEIGHT: 71 IN | WEIGHT: 208.5 LBS

## 2021-04-04 LAB
ANION GAP SERPL CALCULATED.3IONS-SCNC: 4 MMOL/L (ref 3–14)
BUN SERPL-MCNC: 23 MG/DL (ref 7–30)
CALCIUM SERPL-MCNC: 8.4 MG/DL (ref 8.5–10.1)
CHLORIDE SERPL-SCNC: 110 MMOL/L (ref 94–109)
CO2 SERPL-SCNC: 26 MMOL/L (ref 20–32)
CREAT SERPL-MCNC: 0.63 MG/DL (ref 0.66–1.25)
CRP SERPL-MCNC: 12.9 MG/L (ref 0–8)
D DIMER PPP FEU-MCNC: 0.5 UG/ML FEU (ref 0–0.5)
ERYTHROCYTE [DISTWIDTH] IN BLOOD BY AUTOMATED COUNT: 12.6 % (ref 10–15)
FIBRINOGEN PPP-MCNC: 556 MG/DL (ref 200–420)
GFR SERPL CREATININE-BSD FRML MDRD: >90 ML/MIN/{1.73_M2}
GLUCOSE BLDC GLUCOMTR-MCNC: 185 MG/DL (ref 70–99)
GLUCOSE BLDC GLUCOMTR-MCNC: 231 MG/DL (ref 70–99)
GLUCOSE SERPL-MCNC: 147 MG/DL (ref 70–99)
HCT VFR BLD AUTO: 41.1 % (ref 40–53)
HGB BLD-MCNC: 13.5 G/DL (ref 13.3–17.7)
MCH RBC QN AUTO: 28.1 PG (ref 26.5–33)
MCHC RBC AUTO-ENTMCNC: 32.8 G/DL (ref 31.5–36.5)
MCV RBC AUTO: 86 FL (ref 78–100)
PLATELET # BLD AUTO: 401 10E9/L (ref 150–450)
POTASSIUM SERPL-SCNC: 3.6 MMOL/L (ref 3.4–5.3)
RBC # BLD AUTO: 4.8 10E12/L (ref 4.4–5.9)
SODIUM SERPL-SCNC: 140 MMOL/L (ref 133–144)
WBC # BLD AUTO: 8.6 10E9/L (ref 4–11)

## 2021-04-04 PROCEDURE — 250N000012 HC RX MED GY IP 250 OP 636 PS 637: Performed by: HOSPITALIST

## 2021-04-04 PROCEDURE — 250N000013 HC RX MED GY IP 250 OP 250 PS 637: Performed by: HOSPITALIST

## 2021-04-04 PROCEDURE — 85384 FIBRINOGEN ACTIVITY: CPT | Performed by: HOSPITALIST

## 2021-04-04 PROCEDURE — 36415 COLL VENOUS BLD VENIPUNCTURE: CPT | Performed by: HOSPITALIST

## 2021-04-04 PROCEDURE — 85379 FIBRIN DEGRADATION QUANT: CPT | Performed by: HOSPITALIST

## 2021-04-04 PROCEDURE — 93005 ELECTROCARDIOGRAM TRACING: CPT

## 2021-04-04 PROCEDURE — 85027 COMPLETE CBC AUTOMATED: CPT | Performed by: HOSPITALIST

## 2021-04-04 PROCEDURE — 80048 BASIC METABOLIC PNL TOTAL CA: CPT | Performed by: HOSPITALIST

## 2021-04-04 PROCEDURE — 86140 C-REACTIVE PROTEIN: CPT | Performed by: HOSPITALIST

## 2021-04-04 PROCEDURE — 99239 HOSP IP/OBS DSCHRG MGMT >30: CPT | Performed by: INTERNAL MEDICINE

## 2021-04-04 PROCEDURE — 999N001017 HC STATISTIC GLUCOSE BY METER IP

## 2021-04-04 RX ORDER — DEXAMETHASONE 6 MG/1
6 TABLET ORAL DAILY
Qty: 6 TABLET | Refills: 0 | Status: SHIPPED | OUTPATIENT
Start: 2021-04-04 | End: 2022-05-21

## 2021-04-04 RX ADMIN — DEXAMETHASONE 6 MG: 2 TABLET ORAL at 12:58

## 2021-04-04 RX ADMIN — INSULIN ASPART 1 UNITS: 100 INJECTION, SOLUTION INTRAVENOUS; SUBCUTANEOUS at 08:23

## 2021-04-04 RX ADMIN — LORAZEPAM 0.25 MG: 0.5 TABLET ORAL at 08:23

## 2021-04-04 RX ADMIN — INSULIN ASPART 1 UNITS: 100 INJECTION, SOLUTION INTRAVENOUS; SUBCUTANEOUS at 12:59

## 2021-04-04 ASSESSMENT — ACTIVITIES OF DAILY LIVING (ADL)
ADLS_ACUITY_SCORE: 16

## 2021-04-04 NOTE — PLAN OF CARE
Covid positive, Special precaution maintained. A&O X4. VSS on RA. BG @0200 was 231. Up independent. Regular diet. PIV SL. Denied pain/SOB/Nausea. Possible discharge today. Continue to monitor.

## 2021-04-04 NOTE — DISCHARGE SUMMARY
[facility]  Hospitalist Discharge Summary      Date of Admission:  3/31/2021  Date of Discharge:  4/4/2021  Discharging Provider: Laura Baxter MD      Discharge Diagnoses   COVID-19 pneumonia  History of anxiety disorder  Prediabetes  Prolonged QTC  Obesity    Follow-ups Needed After Discharge   Follow-up Appointments     Follow-up and recommended labs and tests       Follow up with primary care provider, Physician No Ref-Primary, within 7   days for hospital follow- up.           Unresulted Labs Ordered in the Past 30 Days of this Admission     No orders found from 3/1/2021 to 4/1/2021.        Discharge Disposition   Discharged to home  Condition at discharge: Stable    Hospital Course         Carlos A Connor is a 40 year old male with a history of anxiety who presented to the ER with worsening shortness of breath and fever.  After evaluation in the ER, it was felt that he should be admitted to the hospitalist service for further evaluation and management.     COVID-19 pneumonia    Symptoms began on March 23.  COVID test was positive on March 26.  Symptoms started to   gradually progress    Started on dexamethasone during admission, will continue the same to complete a 10-day course.    Treated with remdesivir in-house.  Inflammatory markers to evaluate for COVID-19 continue to improve    Patient continues to improve symptomatically and will be discharged on Eliquis prophylactic dose and to complete 10-day course of Decadron    Patient was advised to continue isolation/quarantine    Continue incentive spirometer, Covid get well loop referral made     Anxiety    Continue PTA Paxil and lorazepam.  No changes made at discharge    Prolonged QTC  -Patient had QTC of 528 on admission, prior to discharge EKG was repeated and his QTC had improved to 484    Anemia  Mild, outpatient follow-up     Prediabetes  -Hemoglobin A1c 5.8.  Blood sugar elevated likely from being on steroids  -Covered  with sliding scale while in-house, recommend outpatient follow-up with PCP    Consultations This Hospital Stay   None    Code Status   Full Code    Time Spent on this Encounter   I, Laura Baxter MD, personally saw the patient today and spent greater than 30 minutes discharging this patient.       Laura Baxter MD  [facility]  [address] [place] [address]  Phone: [phone]  ______________________________________________________________________    Physical Exam   Vital Signs: Temp: 96.6  F (35.9  C) Temp src: Oral BP: 120/76 Pulse: 56   Resp: 16 SpO2: 92 % O2 Device: None (Room air)    Weight: 208 lbs 8 oz  Exam:  Constitutional: Awake, alert and no distress. Appears comfortable  Head: Normocephalic. No masses, lesions, tenderness or abnormalities  ENT: ENT exam normal, no neck nodes or sinus tenderness  Cardiovascular: RRR.  No murmurs, no rubs or JVD  Respiratory: Normal WOB,b/l equal air entry, no wheezes or crackles   Gastrointestinal: Abdomen soft, non-tender. BS normal. No masses, organomegaly  : Deferred  Extremities : No edema , no clubbing or cyanosis    Neurologic: Cranial nerves II-XII grossly intact , power symmetrical, Reflexes normal and symmetric. Sensation grossly WNL.         Primary Care Physician   Physician No Ref-Primary    Discharge Orders      COVID-19 GetWell Loop Referral      Reason for your hospital stay    Covid 19 pneumonia     Follow-up and recommended labs and tests     Follow up with primary care provider, Physician No Ref-Primary, within 7 days for hospital follow- up.     Activity    Your activity upon discharge: activity as tolerated     Incentive Spirometry    Continue to use incentive spirometer 6 times a day Until return to normal activity     Contact provider    Contact your primary care provider if If increased trouble breathing, new arm/leg swelling, dizziness/passing out, falls, bleeding that doesn't stop, or uncontrolled  pain.     Discharge - Quarantine/Isolation Instruction    Date of symptom onset:     Date of first positive test:    If you tested positive COVID-19 and show symptoms (fever, cough, body aches or trouble breathing):        Stay home and away from others (self-isolate) until:        At least 10 days have passed since your symptoms started. AND...        You've had no fever-and no medicine that reduces fever for 1 full day (24 hours). AND...         Your other symptoms have resolved (gotten better).  If you tested positive for COVID-19 but don't show symptoms:       Stay home and away from others (self-isolate) until at least 10 days have passed since the date of your first positive COVID-19 test.     Diet    Follow this diet upon discharge: Orders Placed This Encounter      Combination Diet Regular Diet Adult       Significant Results and Procedures   Results for orders placed or performed during the hospital encounter of 03/31/21   XR Chest Port 1 View    Narrative    XR CHEST PORT 1 VW 3/31/2021 7:15 PM    HISTORY: fever, cough. covid +    COMPARISON: None.      Impression    IMPRESSION: Multifocal patchy airspace opacities worst in the mid  lungs bilaterally consistent with pneumonia. No pleural effusion or  pneumothorax. Normal heart size.    ROMAINE CHACON MD       Discharge Medications   Current Discharge Medication List      START taking these medications    Details   apixaban ANTICOAGULANT (ELIQUIS) 2.5 MG tablet Take 1 tablet (2.5 mg) by mouth 2 times daily  Qty: 60 tablet, Refills: 0    Associated Diagnoses: Pneumonia due to 2019 novel coronavirus      dexamethasone (DECADRON) 6 MG tablet Take 1 tablet (6 mg) by mouth daily  Qty: 6 tablet, Refills: 0    Associated Diagnoses: Pneumonia due to 2019 novel coronavirus         CONTINUE these medications which have NOT CHANGED    Details   LORazepam (ATIVAN) 0.5 MG tablet Take 0.25 mg by mouth daily 1/2 x 0.5 mg tab      PARoxetine (PAXIL) 30 MG tablet Take 30 mg  by mouth At Bedtime           Allergies   No Known Allergies

## 2021-04-04 NOTE — PLAN OF CARE
Pt is discharging home at this time w/ all pt's belongings including pt's meds. AVS, meds and education including COVID-19 given. Questions answered. Pt called [facility] for transport.

## 2021-04-04 NOTE — PLAN OF CARE
[id number]. Covid positive. Covid precaution in place. A&OX4. VSS on RA. Denies pain, nausea & diarrhea. Intermittent cough. [place]. IS use, level at 2000. Up in room independently. Tolerating regular diet. BG checks 108, 301, insulin covered. Intake and output adequate. PIV SL. On remdesivir treatment and discharge tomorrow per MD note. Continue to monitor.

## 2021-04-06 LAB — INTERPRETATION ECG - MUSE: NORMAL

## 2021-08-30 ENCOUNTER — HOSPITAL ENCOUNTER (EMERGENCY)
Facility: CLINIC | Age: 41
Discharge: HOME | End: 2021-08-30
Attending: EMERGENCY MEDICINE | Admitting: EMERGENCY MEDICINE

## 2021-08-30 ENCOUNTER — APPOINTMENT (OUTPATIENT)
Dept: GENERAL RADIOLOGY | Facility: CLINIC | Age: 41
End: 2021-08-30
Attending: EMERGENCY MEDICINE

## 2021-08-30 VITALS
HEART RATE: 65 BPM | SYSTOLIC BLOOD PRESSURE: 145 MMHG | TEMPERATURE: 98.4 F | BODY MASS INDEX: 29.4 KG/M2 | RESPIRATION RATE: 18 BRPM | OXYGEN SATURATION: 96 % | DIASTOLIC BLOOD PRESSURE: 102 MMHG | WEIGHT: 210 LBS | HEIGHT: 71 IN

## 2021-08-30 DIAGNOSIS — R07.89 CHEST WALL PAIN: ICD-10-CM

## 2021-08-30 LAB
ANION GAP SERPL CALCULATED.3IONS-SCNC: 4 MMOL/L (ref 3–14)
BASOPHILS # BLD AUTO: 0 10E3/UL (ref 0–0.2)
BASOPHILS NFR BLD AUTO: 0 %
BUN SERPL-MCNC: 11 MG/DL (ref 7–30)
CALCIUM SERPL-MCNC: 8.8 MG/DL (ref 8.5–10.1)
CHLORIDE BLD-SCNC: 107 MMOL/L (ref 94–109)
CO2 SERPL-SCNC: 28 MMOL/L (ref 20–32)
CREAT SERPL-MCNC: 0.65 MG/DL (ref 0.66–1.25)
EOSINOPHIL # BLD AUTO: 0.2 10E3/UL (ref 0–0.7)
EOSINOPHIL NFR BLD AUTO: 2 %
ERYTHROCYTE [DISTWIDTH] IN BLOOD BY AUTOMATED COUNT: 12.7 % (ref 10–15)
GFR SERPL CREATININE-BSD FRML MDRD: >90 ML/MIN/1.73M2
GLUCOSE BLD-MCNC: 113 MG/DL (ref 70–99)
HCT VFR BLD AUTO: 42.8 % (ref 40–53)
HGB BLD-MCNC: 14.3 G/DL (ref 13.3–17.7)
IMM GRANULOCYTES # BLD: 0.1 10E3/UL
IMM GRANULOCYTES NFR BLD: 1 %
LYMPHOCYTES # BLD AUTO: 2.8 10E3/UL (ref 0.8–5.3)
LYMPHOCYTES NFR BLD AUTO: 31 %
MCH RBC QN AUTO: 28.5 PG (ref 26.5–33)
MCHC RBC AUTO-ENTMCNC: 33.4 G/DL (ref 31.5–36.5)
MCV RBC AUTO: 85 FL (ref 78–100)
MONOCYTES # BLD AUTO: 0.7 10E3/UL (ref 0–1.3)
MONOCYTES NFR BLD AUTO: 7 %
NEUTROPHILS # BLD AUTO: 5.3 10E3/UL (ref 1.6–8.3)
NEUTROPHILS NFR BLD AUTO: 59 %
NRBC # BLD AUTO: 0 10E3/UL
NRBC BLD AUTO-RTO: 0 /100
PLATELET # BLD AUTO: 213 10E3/UL (ref 150–450)
POTASSIUM BLD-SCNC: 3.8 MMOL/L (ref 3.4–5.3)
RBC # BLD AUTO: 5.01 10E6/UL (ref 4.4–5.9)
SODIUM SERPL-SCNC: 139 MMOL/L (ref 133–144)
TROPONIN I SERPL-MCNC: <0.015 UG/L (ref 0–0.04)
WBC # BLD AUTO: 9.1 10E3/UL (ref 4–11)

## 2021-08-30 PROCEDURE — 96374 THER/PROPH/DIAG INJ IV PUSH: CPT

## 2021-08-30 PROCEDURE — 84484 ASSAY OF TROPONIN QUANT: CPT | Performed by: EMERGENCY MEDICINE

## 2021-08-30 PROCEDURE — 36415 COLL VENOUS BLD VENIPUNCTURE: CPT | Performed by: EMERGENCY MEDICINE

## 2021-08-30 PROCEDURE — 99285 EMERGENCY DEPT VISIT HI MDM: CPT | Mod: 25

## 2021-08-30 PROCEDURE — 85004 AUTOMATED DIFF WBC COUNT: CPT | Performed by: EMERGENCY MEDICINE

## 2021-08-30 PROCEDURE — 93005 ELECTROCARDIOGRAM TRACING: CPT

## 2021-08-30 PROCEDURE — 71046 X-RAY EXAM CHEST 2 VIEWS: CPT

## 2021-08-30 PROCEDURE — 250N000011 HC RX IP 250 OP 636: Performed by: EMERGENCY MEDICINE

## 2021-08-30 PROCEDURE — 80048 BASIC METABOLIC PNL TOTAL CA: CPT | Performed by: EMERGENCY MEDICINE

## 2021-08-30 RX ORDER — KETOROLAC TROMETHAMINE 15 MG/ML
15 INJECTION, SOLUTION INTRAMUSCULAR; INTRAVENOUS ONCE
Status: COMPLETED | OUTPATIENT
Start: 2021-08-30 | End: 2021-08-30

## 2021-08-30 RX ADMIN — KETOROLAC TROMETHAMINE 15 MG: 15 INJECTION, SOLUTION INTRAMUSCULAR; INTRAVENOUS at 11:03

## 2021-08-30 ASSESSMENT — MIFFLIN-ST. JEOR: SCORE: 1889.68

## 2021-08-30 NOTE — DISCHARGE INSTRUCTIONS
Your chest pain seems to be from the muscles and bones around your chest.  Use ibuprofen and Tylenol to help with the pain.  Return to the emergency department with any new or worrisome symptoms.    Discharge Instructions  Chest Pain    You have been seen today for chest pain or discomfort.  At this time, your provider has found no signs that your chest pain is due to a serious or life-threatening condition, (or you have declined more testing and/or admission to the hospital). However, sometimes there is a serious problem that does not show up right away. Your evaluation today may not be complete and you may need further testing and evaluation.     Generally, every Emergency Department visit should have a follow-up clinic visit with either a primary or a specialty clinic/provider. Please follow-up as instructed by your emergency provider today.  Return to the Emergency Department if:  Your chest pain changes, gets worse, starts to happen more often, or comes with less activity.  You are newly short of breath.  You get very weak or tired.  You pass out or faint.  You have any new symptoms, like fever, cough, numb legs, or you cough up blood.  You have anything else that worries you.    Until you follow-up with your regular provider, please do the following:  Take one aspirin daily unless you have an allergy or are told not to by your provider.  If a stress test appointment has been made, go to the appointment.  If you have questions, contact your regular provider.  Follow-up with your regular provider/clinic as directed; this is very important.    If you were given a prescription for medicine here today, be sure to read all of the information (including the package insert) that comes with your prescription.  This will include important information about the medicine, its side effects, and any warnings that you need to know about.  The pharmacist who fills the prescription can provide more information and answer  questions you may have about the medicine.  If you have questions or concerns that the pharmacist cannot address, please call or return to the Emergency Department.       Remember that you can always come back to the Emergency Department if you are not able to see your regular provider in the amount of time listed above, if you get any new symptoms, or if there is anything that worries you.

## 2021-08-30 NOTE — ED TRIAGE NOTES
midsternal chest pain - sharp - painful with palpitation or movement   Started on Friday worst yesterday went away return this morning - EMS called  given and nitroglycerin times 3 given

## 2021-08-30 NOTE — ED PROVIDER NOTES
"  History     Chief Complaint:  Chest Wall Pain       HPI   Carlos A Connor is a 40 year old male who presents with left-sided chest pain.  Pain was mild after playing soccer for the first time in 2 years.  He notes that he was hit in the chest while playing soccer on Saturday.  Sunday pain was worse but tolerable.  Today pain worsened significantly while in the store she called EMS who brought him here to the emergency department.  EMS reported a normal EKG.  He notes pains on the left side and worse with movement.  He received nitroglycerin x3 in route as well as aspirin with mild relief of his symptoms.  He denies any leg pain or swelling.  He is not had any hemoptysis or shortness of breath.  No other concerns.  No cough or fevers.    Allergies:  Droperidol    Medications:    Dexamethasone   Lorazepam   Paroxetine     Past Medical History:    Anxiety  Migraines  Pneumonia due to coronavirus   Substance abuse   Methamphetamine abuse  Cocaine abuse    Social History:   reports that he quit smoking about 13 years ago. He has a 1.50 pack-year smoking history. He has never used smokeless tobacco. He reports current alcohol use. He reports previous drug use. Drug: Cocaine.    PCP: No Ref-Primary, Physician     Review of Systems   All other systems reviewed and are negative.        Physical Exam     Patient Vitals for the past 24 hrs:   BP Temp Temp src Pulse Resp SpO2 Height Weight   08/30/21 1057 (!) 148/99 98.4  F (36.9  C) Oral 62 20 98 % 1.803 m (5' 11\") 95.3 kg (210 lb)        Physical Exam  General: Resting comfortably on the gurney  Eyes:  The pupils are equal and round    Conjunctivae and sclerae are normal  ENT:    The nose is normal    Pinnae are normal  CV:  Regular rate and rhythm     No edema  Resp:  Lungs are clear    Non-labored    No rales    No wheezing   GI:  Abdomen is soft, there is no rigidity    No distension    No rebound tenderness   MS:  Normal muscular tone    No asymmetric leg " swelling    Tenderness of left chest wall  Skin:  No rash or acute skin lesions noted  Neuro:   Awake, alert.      Speech is normal and fluent.    Face is symmetric.     Moves all extremities    Emergency Department Course     ECG  ECG taken at 1122, ECG read at 1122  Sinus rhythm. Minimal voltage for LVH, may be normal variant. ST elevation, consider early repolarization, pericarditis, or injury. Abnormal ECG.   LBBB resolved as compared to prior, dated 10/26/2020.  Rate 63 bpm. SD interval 128 ms. QRS duration 92 ms. QT/QTc 432/442 ms. P-R-T axes 27 7 -3.     Imaging:  XR Chest 2 views:  No acute cardiopulmonary disease.  Per radiology.     Laboratory:  CBC: WBC 9.1, HGB 14.3,    BMP: Glucose 113(H) o/w WNL (Creatinine 0.65(L))     Troponin (Collected 1152): <0.015     Interventions:  1103 Toradol, 15 mg, IV    Emergency Department Course:  Past medical records, nursing notes, and vitals reviewed.  I performed an exam of the patient and obtained history, as documented above.      Impression & Plan      Medical Decision Making:  Carlos A Connor is a 40 year old male who presents to the emergency department with left-sided chest pain.  Pain started on Saturday and is gradually worsened over that time when it became severe while at the store today.  Called EMS who brought him here.  He was given nitro and aspirin.  He notes mild improvement over this discomfort with those interventions.  Here his vital signs are normal.  His oxygenation, heart rate are normal.  He had no leg pain or swelling.  He is PERC negative.  There is no hemoptysis.  He previously been on anticoagulation after being discharged from the hospital related to COVID-19.  He is no longer taking this.  He never had a PE or DVT.  EKG shows early repolarization.  Troponin was negative and given the duration of his symptoms I doubt ACS.  His pain was remarkably reproducible on exam so I think it is likely a musculoskeletal cause of his pain.   Encouraged him to use ibuprofen and Tylenol home for pain control.  He noted significant improvement after Toradol administration here.  Discharged home encouraged return with any new or worrisome symptoms.      Diagnosis:    ICD-10-CM    1. Chest wall pain  R07.89         8/30/2021   Jeovany Mckinnon MD [place], Aaron Joseph, MD  08/30/21 1246

## 2021-08-30 NOTE — ED NOTES
Bed: ED07  Expected date:   Expected time:   Means of arrival:   Comments:  [facility] - 40 M chest pain eta 1050

## 2021-08-31 LAB
ATRIAL RATE - MUSE: 63 BPM
DIASTOLIC BLOOD PRESSURE - MUSE: NORMAL MMHG
INTERPRETATION ECG - MUSE: NORMAL
P AXIS - MUSE: 27 DEGREES
PR INTERVAL - MUSE: 128 MS
QRS DURATION - MUSE: 92 MS
QT - MUSE: 432 MS
QTC - MUSE: 442 MS
R AXIS - MUSE: 7 DEGREES
SYSTOLIC BLOOD PRESSURE - MUSE: NORMAL MMHG
T AXIS - MUSE: -3 DEGREES
VENTRICULAR RATE- MUSE: 63 BPM

## 2022-04-05 ENCOUNTER — APPOINTMENT (OUTPATIENT)
Dept: CT IMAGING | Facility: CLINIC | Age: 42
End: 2022-04-05
Attending: EMERGENCY MEDICINE

## 2022-04-05 ENCOUNTER — HOSPITAL ENCOUNTER (EMERGENCY)
Facility: CLINIC | Age: 42
Discharge: HOME | End: 2022-04-05
Attending: EMERGENCY MEDICINE | Admitting: EMERGENCY MEDICINE

## 2022-04-05 VITALS
RESPIRATION RATE: 12 BRPM | HEART RATE: 95 BPM | WEIGHT: 215 LBS | SYSTOLIC BLOOD PRESSURE: 163 MMHG | TEMPERATURE: 97.7 F | BODY MASS INDEX: 30.1 KG/M2 | DIASTOLIC BLOOD PRESSURE: 113 MMHG | HEIGHT: 71 IN | OXYGEN SATURATION: 95 %

## 2022-04-05 DIAGNOSIS — I10 HYPERTENSION, UNSPECIFIED TYPE: ICD-10-CM

## 2022-04-05 DIAGNOSIS — S06.0X0A CONCUSSION WITHOUT LOSS OF CONSCIOUSNESS, INITIAL ENCOUNTER: ICD-10-CM

## 2022-04-05 DIAGNOSIS — S10.93XA CONTUSION OF FACE, SCALP AND NECK, INITIAL ENCOUNTER: ICD-10-CM

## 2022-04-05 DIAGNOSIS — M54.2 CERVICAL PAIN (NECK): ICD-10-CM

## 2022-04-05 DIAGNOSIS — S00.03XA CONTUSION OF FACE, SCALP AND NECK, INITIAL ENCOUNTER: ICD-10-CM

## 2022-04-05 DIAGNOSIS — S00.83XA CONTUSION OF FACE, SCALP AND NECK, INITIAL ENCOUNTER: ICD-10-CM

## 2022-04-05 PROCEDURE — 99285 EMERGENCY DEPT VISIT HI MDM: CPT | Mod: 25

## 2022-04-05 PROCEDURE — 70486 CT MAXILLOFACIAL W/O DYE: CPT

## 2022-04-05 PROCEDURE — 72125 CT NECK SPINE W/O DYE: CPT

## 2022-04-05 PROCEDURE — 70450 CT HEAD/BRAIN W/O DYE: CPT

## 2022-04-05 ASSESSMENT — ENCOUNTER SYMPTOMS
HEADACHES: 1
NECK PAIN: 1
VOMITING: 0
DIZZINESS: 1
NAUSEA: 1
BACK PAIN: 1

## 2022-04-06 NOTE — DISCHARGE INSTRUCTIONS
Your CAT scans do not show any acute injury of the facial bones, skull, brain, or neck.  Tylenol and or ibuprofen for pain.  Continue to ice the wounds.  Your blood pressure is noted to be elevated today so get this rechecked at your primary care doctors.
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
## 2022-05-20 ENCOUNTER — APPOINTMENT (OUTPATIENT)
Dept: CT IMAGING | Facility: CLINIC | Age: 42
End: 2022-05-20
Attending: EMERGENCY MEDICINE
Payer: MEDICAID

## 2022-05-20 ENCOUNTER — HOSPITAL ENCOUNTER (EMERGENCY)
Facility: CLINIC | Age: 42
Discharge: HOME | End: 2022-05-20
Attending: EMERGENCY MEDICINE | Admitting: EMERGENCY MEDICINE
Payer: MEDICAID

## 2022-05-20 VITALS
DIASTOLIC BLOOD PRESSURE: 80 MMHG | TEMPERATURE: 98.3 F | RESPIRATION RATE: 16 BRPM | SYSTOLIC BLOOD PRESSURE: 140 MMHG | OXYGEN SATURATION: 94 % | HEART RATE: 67 BPM

## 2022-05-20 DIAGNOSIS — R51.9 NONINTRACTABLE HEADACHE, UNSPECIFIED CHRONICITY PATTERN, UNSPECIFIED HEADACHE TYPE: ICD-10-CM

## 2022-05-20 DIAGNOSIS — F10.10 ALCOHOL ABUSE: ICD-10-CM

## 2022-05-20 DIAGNOSIS — F14.10 COCAINE ABUSE (H): ICD-10-CM

## 2022-05-20 LAB
ALBUMIN SERPL-MCNC: 3.6 G/DL (ref 3.4–5)
ALP SERPL-CCNC: 86 U/L (ref 40–150)
ALT SERPL W P-5'-P-CCNC: 62 U/L (ref 0–70)
AMPHETAMINES UR QL SCN: ABNORMAL
ANION GAP SERPL CALCULATED.3IONS-SCNC: 9 MMOL/L (ref 3–14)
AST SERPL W P-5'-P-CCNC: 20 U/L (ref 0–45)
BARBITURATES UR QL: ABNORMAL
BASOPHILS # BLD AUTO: 0 10E3/UL (ref 0–0.2)
BASOPHILS NFR BLD AUTO: 0 %
BENZODIAZ UR QL: ABNORMAL
BILIRUB SERPL-MCNC: 1.5 MG/DL (ref 0.2–1.3)
BUN SERPL-MCNC: 10 MG/DL (ref 7–30)
CALCIUM SERPL-MCNC: 9.1 MG/DL (ref 8.5–10.1)
CANNABINOIDS UR QL SCN: ABNORMAL
CHLORIDE BLD-SCNC: 102 MMOL/L (ref 94–109)
CO2 SERPL-SCNC: 24 MMOL/L (ref 20–32)
COCAINE UR QL: ABNORMAL
CREAT SERPL-MCNC: 0.57 MG/DL (ref 0.66–1.25)
EOSINOPHIL # BLD AUTO: 0 10E3/UL (ref 0–0.7)
EOSINOPHIL NFR BLD AUTO: 0 %
ERYTHROCYTE [DISTWIDTH] IN BLOOD BY AUTOMATED COUNT: 12.5 % (ref 10–15)
GFR SERPL CREATININE-BSD FRML MDRD: >90 ML/MIN/1.73M2
GLUCOSE BLD-MCNC: 138 MG/DL (ref 70–99)
GLUCOSE BLDC GLUCOMTR-MCNC: 176 MG/DL (ref 70–99)
HCT VFR BLD AUTO: 44.1 % (ref 40–53)
HGB BLD-MCNC: 14.7 G/DL (ref 13.3–17.7)
HOLD SPECIMEN: NORMAL
IMM GRANULOCYTES # BLD: 0 10E3/UL
IMM GRANULOCYTES NFR BLD: 0 %
LYMPHOCYTES # BLD AUTO: 3.1 10E3/UL (ref 0.8–5.3)
LYMPHOCYTES NFR BLD AUTO: 33 %
MCH RBC QN AUTO: 28.8 PG (ref 26.5–33)
MCHC RBC AUTO-ENTMCNC: 33.3 G/DL (ref 31.5–36.5)
MCV RBC AUTO: 86 FL (ref 78–100)
MONOCYTES # BLD AUTO: 0.6 10E3/UL (ref 0–1.3)
MONOCYTES NFR BLD AUTO: 6 %
NEUTROPHILS # BLD AUTO: 5.7 10E3/UL (ref 1.6–8.3)
NEUTROPHILS NFR BLD AUTO: 61 %
NRBC # BLD AUTO: 0 10E3/UL
NRBC BLD AUTO-RTO: 0 /100
OPIATES UR QL SCN: ABNORMAL
PCP UR QL SCN: ABNORMAL
PLATELET # BLD AUTO: 240 10E3/UL (ref 150–450)
POTASSIUM BLD-SCNC: 3.7 MMOL/L (ref 3.4–5.3)
PROT SERPL-MCNC: 7.5 G/DL (ref 6.8–8.8)
RBC # BLD AUTO: 5.11 10E6/UL (ref 4.4–5.9)
SODIUM SERPL-SCNC: 135 MMOL/L (ref 133–144)
WBC # BLD AUTO: 9.5 10E3/UL (ref 4–11)

## 2022-05-20 PROCEDURE — 96361 HYDRATE IV INFUSION ADD-ON: CPT

## 2022-05-20 PROCEDURE — 258N000003 HC RX IP 258 OP 636: Performed by: EMERGENCY MEDICINE

## 2022-05-20 PROCEDURE — 250N000013 HC RX MED GY IP 250 OP 250 PS 637: Performed by: EMERGENCY MEDICINE

## 2022-05-20 PROCEDURE — 70450 CT HEAD/BRAIN W/O DYE: CPT

## 2022-05-20 PROCEDURE — 80307 DRUG TEST PRSMV CHEM ANLYZR: CPT | Performed by: EMERGENCY MEDICINE

## 2022-05-20 PROCEDURE — 99284 EMERGENCY DEPT VISIT MOD MDM: CPT | Mod: 25

## 2022-05-20 PROCEDURE — 96367 TX/PROPH/DG ADDL SEQ IV INF: CPT

## 2022-05-20 PROCEDURE — 85025 COMPLETE CBC W/AUTO DIFF WBC: CPT | Performed by: EMERGENCY MEDICINE

## 2022-05-20 PROCEDURE — 80053 COMPREHEN METABOLIC PANEL: CPT | Performed by: EMERGENCY MEDICINE

## 2022-05-20 PROCEDURE — 36415 COLL VENOUS BLD VENIPUNCTURE: CPT | Performed by: EMERGENCY MEDICINE

## 2022-05-20 PROCEDURE — 96365 THER/PROPH/DIAG IV INF INIT: CPT

## 2022-05-20 PROCEDURE — 250N000011 HC RX IP 250 OP 636: Performed by: EMERGENCY MEDICINE

## 2022-05-20 RX ORDER — ACETAMINOPHEN 500 MG
1000 TABLET ORAL ONCE
Status: COMPLETED | OUTPATIENT
Start: 2022-05-20 | End: 2022-05-20

## 2022-05-20 RX ORDER — METOCLOPRAMIDE HYDROCHLORIDE 5 MG/ML
5 INJECTION INTRAMUSCULAR; INTRAVENOUS ONCE
Status: COMPLETED | OUTPATIENT
Start: 2022-05-20 | End: 2022-05-20

## 2022-05-20 RX ORDER — DIPHENHYDRAMINE HYDROCHLORIDE 50 MG/ML
25 INJECTION INTRAMUSCULAR; INTRAVENOUS ONCE
Status: COMPLETED | OUTPATIENT
Start: 2022-05-20 | End: 2022-05-20

## 2022-05-20 RX ADMIN — ACETAMINOPHEN 1000 MG: 500 TABLET, FILM COATED ORAL at 01:09

## 2022-05-20 RX ADMIN — SODIUM CHLORIDE 1000 ML: 9 INJECTION, SOLUTION INTRAVENOUS at 07:26

## 2022-05-20 RX ADMIN — DIPHENHYDRAMINE HYDROCHLORIDE 25 MG: 50 INJECTION, SOLUTION INTRAMUSCULAR; INTRAVENOUS at 07:28

## 2022-05-20 RX ADMIN — METOCLOPRAMIDE 5 MG: 5 INJECTION, SOLUTION INTRAMUSCULAR; INTRAVENOUS at 07:25

## 2022-05-20 ASSESSMENT — ENCOUNTER SYMPTOMS
NUMBNESS: 0
NAUSEA: 0
CHILLS: 0
BLOOD IN STOOL: 0
HEADACHES: 1
FEVER: 0
PHOTOPHOBIA: 0
DIAPHORESIS: 0
SPEECH DIFFICULTY: 0
WEAKNESS: 0
PALPITATIONS: 0
NECK PAIN: 0
SHORTNESS OF BREATH: 0
VOMITING: 0

## 2022-05-20 NOTE — ED PROVIDER NOTES
History   Chief Complaint:  Headache       HPI   Carlos A Connor is a 41 year old male with history of anxiety who presents with a headache. The patient states he woke up yesterday with a headache to the front and sides of his head that has been constant since. He denies any fall or injury to the head and has no history of headaches. He tried taking Tylenol yesterday which did not help at all. He states changing positions makes it worse, but not palpations. He has no light sensitivity although reports some recent vision changes with increased blurriness to both eyes. No slurred speech, no numbness, tingling, or weakness anywhere. No neck pain, chest pain, or shortness of breath. The patient was put on Eliquis last year when he had Covid, but he has since been off of this. He takes lorazepam and paroxetine for anxiety but denies any other medications or history of high blood pressure. He notes he had about 8-9 beers on Wednesday night which is more than usual for him. He has never had any alcohol withdrawal and denies any used any other drugs recently and states it has been a few months since he last used cocaine or meth. No nausea, vomiting, black or bloody stools. No fever, sweats, chills, or palpitations. He denies any other symptoms and did not have any alcohol yesterday.    Review of Systems   Constitutional: Negative for chills, diaphoresis and fever.   Eyes: Positive for visual disturbance (blurry vision, both eyes). Negative for photophobia.   Respiratory: Negative for shortness of breath.    Cardiovascular: Negative for chest pain and palpitations.   Gastrointestinal: Negative for blood in stool, nausea and vomiting.   Musculoskeletal: Negative for neck pain.   Neurological: Positive for headaches. Negative for speech difficulty, weakness and numbness.   All other systems reviewed and are negative.    Allergies:  Droperidol    Medications:  Ativan  Paxil    Past Medical History:     Anxiety  Chest  pain    Past Surgical History:    The patient denies past surgical history.     Family History:    The patient denies past family history.    Social History:  The patient presents alone. Former somker.    Physical Exam     Patient Vitals for the past 24 hrs:   BP Temp Temp src Pulse Resp SpO2   05/20/22 0845 (!) 140/80 -- -- -- 16 94 %   05/20/22 0830 (!) 146/92 -- -- 67 -- 95 %   05/20/22 0815 -- -- -- -- -- 95 %   05/20/22 0800 (!) 140/85 -- -- 68 -- 96 %   05/20/22 0745 -- -- -- -- -- 95 %   05/20/22 0733 -- -- -- -- -- 95 %   05/20/22 0732 (!) 152/99 -- -- 67 16 95 %   05/20/22 0726 -- -- -- -- -- 97 %   05/20/22 0725 (!) 158/100 -- -- 70 -- --   05/20/22 0700 (!) 161/101 -- -- 76 -- 91 %   05/20/22 0100 (!) 183/124 98.3  F (36.8  C) Temporal 91 18 97 %     Physical Exam  SKIN:  Warm, dry.  No jaundice.  HEMATOLOGIC/IMMUNOLOGIC/LYMPHATIC:  No pallor.  HENT: Well-tolerated active range of motion of head and neck.  This does not produce neck pain.  EYES:  Conjunctivae normal.  Anicteric.  Normal extraocular motion.  Pupils equal round and reactive to light.  Visual fields intact.  No photophobia.  No nystagmus.  CARDIOVASCULAR:  Regular rate and rhythm.  No murmur.  RESPIRATORY:  No respiratory distress, breath sounds equal and normal.  GASTROINTESTINAL:  Soft, nontender abdomen.  No hepatomegaly.  MUSCULOSKELETAL: Normal body habitus.  Full active range of motion of the extremities.  NEUROLOGIC:  Alert, conversant.  Oriented to self place and time.  No aphasia or dysarthria.  No tactile sensory or motor deficits of the face or limbs.  No limb ataxia.  PSYCHIATRIC:  Normal mood.    Emergency Department Course   ECG  ECG results from 08/30/21   EKG 12-lead, tracing only     Value    Systolic Blood Pressure     Diastolic Blood Pressure     Ventricular Rate 63    Atrial Rate 63    DE Interval 128    QRS Duration 92        QTc 442    P Axis 27    R AXIS 7    T Axis -3    Interpretation ECG      Sinus  rhythm  Minimal voltage criteria for LVH, may be normal variant  ST elevation, consider early repolarization, pericarditis, or injury  Abnormal ECG  When compared with ECG of 26-OCT-2020 08:47,  Left bundle branch block is no longer Present  Confirmed by GENERATED REPORT, COMPUTER ([id number]),  Kalpana Turner ([id number]) on 8/31/2021 2:12:35 AM       Laboratory:  Labs Ordered and Resulted from Time of ED Arrival to Time of ED Departure   GLUCOSE BY METER - Abnormal       Result Value    GLUCOSE BY METER POCT 176 (*)    COMPREHENSIVE METABOLIC PANEL - Abnormal    Sodium 135      Potassium 3.7      Chloride 102      Carbon Dioxide (CO2) 24      Anion Gap 9      Urea Nitrogen 10      Creatinine 0.57 (*)     Calcium 9.1      Glucose 138 (*)     Alkaline Phosphatase 86      AST 20      ALT 62      Protein Total 7.5      Albumin 3.6      Bilirubin Total 1.5 (*)     GFR Estimate >90     DRUG ABUSE SCREEN 77 URINE (FL, RH, SH) - Abnormal    Amphetamines Urine Screen Negative      Barbiturates Urine Screen Negative      Benzodiazepines Urine Screen Negative      Cannabinoids Urine Screen Negative      Cocaine Urine Screen Positive (*)     Opiates Urine Screen Negative      PCP Urine Screen Negative     CBC WITH PLATELETS AND DIFFERENTIAL    WBC Count 9.5      RBC Count 5.11      Hemoglobin 14.7      Hematocrit 44.1      MCV 86      MCH 28.8      MCHC 33.3      RDW 12.5      Platelet Count 240      % Neutrophils 61      % Lymphocytes 33      % Monocytes 6      % Eosinophils 0      % Basophils 0      % Immature Granulocytes 0      NRBCs per 100 WBC 0      Absolute Neutrophils 5.7      Absolute Lymphocytes 3.1      Absolute Monocytes 0.6      Absolute Eosinophils 0.0      Absolute Basophils 0.0      Absolute Immature Granulocytes 0.0      Absolute NRBCs 0.0          Procedures  None    Emergency Department Course:  Reviewed:  I reviewed nursing notes, vitals, past medical history and Care Everywhere    Assessments:  0652 I  obtained history and examined the patient as noted above.   0818 I rechecked the patient and explained findings. He is feeling much improved.    Interventions:  0109 Tylenol, 1000 mg, PO  0725 Reglan, 5 mg, IV  0726 NS 1L IV Bolus  0728 Benadryl, 25 mg, IV    Disposition:  The patient was discharged to home.     Impression & Plan     CMS Diagnoses: None    Medical Decision Making:  This patient presents with complaint of headache.  Admits to fairly heavy alcohol use 2 nights ago.  Arrived with a quite elevated triage blood pressure.  Considered intracranial pathology, imaging performed as above.  Considered headache secondary to hypertension or hypertension secondary to headache.  With our interventions his blood pressure nearly normalized.  So likely a function of his headache.  With treatment his headache nearly resolved.  He had a history notable per medical record review of amphetamine abuse.  He denied recent cocaine use but his drug screen is positive.  Certainly could elevated blood pressure and cause headache as well.  Testing otherwise including EKG and blood work was reassuring.  He feels well enough to be discharged home.  I counseled the patient to refrain from alcohol abuse or using cocaine.  Advised to return here if feeling worse or if new concerning symptoms otherwise primary care follow-up.      Diagnosis:    ICD-10-CM    1. Nonintractable headache, unspecified chronicity pattern, unspecified headache type  R51.9    2. Alcohol abuse  F10.10    3. Cocaine abuse (H)  F14.10        Discharge Medications:  New Prescriptions    No medications on file       Scribe Disclosure:  Misa NJ, am serving as a scribe at 6:47 AM on 5/20/2022 to document services personally performed by Aguila Tatum MD based on my observations and the provider's statements to me.        Aguila Tatum MD  05/20/22 [id number]

## 2022-05-20 NOTE — ED TRIAGE NOTES
Pt walks in to triage with c/o HA. Pt admits to etoh yesterday and has unbearable headache now. Pt has taken tylenol w/o relief but not drinking fluids. Pt denies N/v

## 2022-05-21 ENCOUNTER — APPOINTMENT (OUTPATIENT)
Dept: CT IMAGING | Facility: CLINIC | Age: 42
End: 2022-05-21
Attending: EMERGENCY MEDICINE
Payer: MEDICAID

## 2022-05-21 ENCOUNTER — HOSPITAL ENCOUNTER (INPATIENT)
Facility: CLINIC | Age: 42
LOS: 6 days | Discharge: HOME | End: 2022-05-27
Attending: EMERGENCY MEDICINE | Admitting: HOSPITALIST
Payer: MEDICAID

## 2022-05-21 DIAGNOSIS — R51.9 ACUTE INTRACTABLE HEADACHE, UNSPECIFIED HEADACHE TYPE: ICD-10-CM

## 2022-05-21 DIAGNOSIS — G03.9 MENINGITIS: ICD-10-CM

## 2022-05-21 PROBLEM — U07.1 PNEUMONIA DUE TO 2019 NOVEL CORONAVIRUS: Status: RESOLVED | Noted: 2021-03-31 | Resolved: 2022-05-21

## 2022-05-21 PROBLEM — J12.82 PNEUMONIA DUE TO 2019 NOVEL CORONAVIRUS: Status: RESOLVED | Noted: 2021-03-31 | Resolved: 2022-05-21

## 2022-05-21 PROBLEM — B02.1 MENINGITIS DUE TO HERPES ZOSTER VIRUS: Status: ACTIVE | Noted: 2022-05-21

## 2022-05-21 PROBLEM — R06.00 DYSPNEA, UNSPECIFIED TYPE: Status: RESOLVED | Noted: 2021-03-31 | Resolved: 2022-05-21

## 2022-05-21 PROBLEM — R50.9 FEVER AND CHILLS: Status: RESOLVED | Noted: 2021-03-31 | Resolved: 2022-05-21

## 2022-05-21 LAB
ANION GAP SERPL CALCULATED.3IONS-SCNC: 9 MMOL/L (ref 3–14)
APPEARANCE CSF: CLEAR
BASOPHILS # BLD AUTO: 0 10E3/UL (ref 0–0.2)
BASOPHILS NFR BLD AUTO: 0 %
BUN SERPL-MCNC: 12 MG/DL (ref 7–30)
C GATTII+NEOFOR DNA CSF QL NAA+NON-PROBE: NEGATIVE
CALCIUM SERPL-MCNC: 8.3 MG/DL (ref 8.5–10.1)
CHLORIDE BLD-SCNC: 105 MMOL/L (ref 94–109)
CMV DNA CSF QL NAA+NON-PROBE: NEGATIVE
CO2 SERPL-SCNC: 24 MMOL/L (ref 20–32)
COLOR CSF: COLORLESS
CREAT SERPL-MCNC: 0.84 MG/DL (ref 0.66–1.25)
E COLI K1 AG CSF QL: NEGATIVE
EOSINOPHIL # BLD AUTO: 0 10E3/UL (ref 0–0.7)
EOSINOPHIL NFR BLD AUTO: 0 %
EOSINOPHIL NFR CSF MANUAL: 1 %
ERYTHROCYTE [DISTWIDTH] IN BLOOD BY AUTOMATED COUNT: 12.5 % (ref 10–15)
EV RNA SPEC QL NAA+PROBE: NEGATIVE
GFR SERPL CREATININE-BSD FRML MDRD: >90 ML/MIN/1.73M2
GLUCOSE BLD-MCNC: 211 MG/DL (ref 70–99)
GLUCOSE BLDC GLUCOMTR-MCNC: 199 MG/DL (ref 70–99)
GLUCOSE CSF-MCNC: 99 MG/DL (ref 40–70)
GP B STREP DNA CSF QL NAA+NON-PROBE: NEGATIVE
HAEM INFLU DNA CSF QL NAA+NON-PROBE: NEGATIVE
HCT VFR BLD AUTO: 43.8 % (ref 40–53)
HGB BLD-MCNC: 14.3 G/DL (ref 13.3–17.7)
HHV6 DNA CSF QL NAA+NON-PROBE: NEGATIVE
HSV1 DNA CSF QL NAA+NON-PROBE: NEGATIVE
HSV1 DNA CSF QL NAA+PROBE: NOT DETECTED
HSV2 DNA CSF QL NAA+NON-PROBE: NEGATIVE
HSV2 DNA CSF QL NAA+PROBE: NOT DETECTED
IMM GRANULOCYTES # BLD: 0 10E3/UL
IMM GRANULOCYTES NFR BLD: 0 %
L MONOCYTOG DNA CSF QL NAA+NON-PROBE: NEGATIVE
LYMPH ABN NFR CSF MANUAL: 93 %
LYMPHOCYTES # BLD AUTO: 1.8 10E3/UL (ref 0.8–5.3)
LYMPHOCYTES NFR BLD AUTO: 20 %
MCH RBC QN AUTO: 29.1 PG (ref 26.5–33)
MCHC RBC AUTO-ENTMCNC: 32.6 G/DL (ref 31.5–36.5)
MCV RBC AUTO: 89 FL (ref 78–100)
MONOCYTES # BLD AUTO: 0.4 10E3/UL (ref 0–1.3)
MONOCYTES NFR BLD AUTO: 4 %
MONOS+MACROS NFR CSF MANUAL: 2 %
N MEN DNA CSF QL NAA+NON-PROBE: NEGATIVE
NEUTROPHILS # BLD AUTO: 6.9 10E3/UL (ref 1.6–8.3)
NEUTROPHILS NFR BLD AUTO: 76 %
NEUTROPHILS NFR CSF MANUAL: 4 %
NRBC # BLD AUTO: 0 10E3/UL
NRBC BLD AUTO-RTO: 0 /100
PARECHOVIRUS A RNA CSF QL NAA+NON-PROBE: NEGATIVE
PLATELET # BLD AUTO: 231 10E3/UL (ref 150–450)
POTASSIUM BLD-SCNC: 3.8 MMOL/L (ref 3.4–5.3)
PROT CSF-MCNC: 103 MG/DL (ref 15–60)
RBC # BLD AUTO: 4.92 10E6/UL (ref 4.4–5.9)
RBC # CSF MANUAL: 7 /UL (ref 0–2)
S PNEUM DNA CSF QL NAA+NON-PROBE: NEGATIVE
SARS-COV-2 RNA RESP QL NAA+PROBE: NEGATIVE
SODIUM SERPL-SCNC: 138 MMOL/L (ref 133–144)
TUBE # CSF: 4
VZV DNA CSF QL NAA+NON-PROBE: POSITIVE
WBC # BLD AUTO: 9.3 10E3/UL (ref 4–11)
WBC # CSF MANUAL: 367 /UL (ref 0–5)

## 2022-05-21 PROCEDURE — 250N000011 HC RX IP 250 OP 636: Performed by: EMERGENCY MEDICINE

## 2022-05-21 PROCEDURE — 99285 EMERGENCY DEPT VISIT HI MDM: CPT | Mod: 25

## 2022-05-21 PROCEDURE — 96361 HYDRATE IV INFUSION ADD-ON: CPT

## 2022-05-21 PROCEDURE — 258N000003 HC RX IP 258 OP 636: Performed by: HOSPITALIST

## 2022-05-21 PROCEDURE — 258N000003 HC RX IP 258 OP 636: Performed by: EMERGENCY MEDICINE

## 2022-05-21 PROCEDURE — 82945 GLUCOSE OTHER FLUID: CPT | Performed by: EMERGENCY MEDICINE

## 2022-05-21 PROCEDURE — 89050 BODY FLUID CELL COUNT: CPT | Performed by: EMERGENCY MEDICINE

## 2022-05-21 PROCEDURE — 87529 HSV DNA AMP PROBE: CPT | Performed by: EMERGENCY MEDICINE

## 2022-05-21 PROCEDURE — 009U3ZX DRAINAGE OF SPINAL CANAL, PERCUTANEOUS APPROACH, DIAGNOSTIC: ICD-10-PCS | Performed by: EMERGENCY MEDICINE

## 2022-05-21 PROCEDURE — 70450 CT HEAD/BRAIN W/O DYE: CPT

## 2022-05-21 PROCEDURE — 96365 THER/PROPH/DIAG IV INF INIT: CPT

## 2022-05-21 PROCEDURE — 250N000011 HC RX IP 250 OP 636: Performed by: HOSPITALIST

## 2022-05-21 PROCEDURE — 36415 COLL VENOUS BLD VENIPUNCTURE: CPT | Performed by: EMERGENCY MEDICINE

## 2022-05-21 PROCEDURE — 87389 HIV-1 AG W/HIV-1&-2 AB AG IA: CPT | Performed by: INTERNAL MEDICINE

## 2022-05-21 PROCEDURE — 87483 CNS DNA AMP PROBE TYPE 12-25: CPT | Performed by: EMERGENCY MEDICINE

## 2022-05-21 PROCEDURE — 85025 COMPLETE CBC W/AUTO DIFF WBC: CPT | Performed by: EMERGENCY MEDICINE

## 2022-05-21 PROCEDURE — 120N000001 HC R&B MED SURG/OB

## 2022-05-21 PROCEDURE — 87070 CULTURE OTHR SPECIMN AEROBIC: CPT | Performed by: EMERGENCY MEDICINE

## 2022-05-21 PROCEDURE — 250N000013 HC RX MED GY IP 250 OP 250 PS 637: Performed by: HOSPITALIST

## 2022-05-21 PROCEDURE — 87205 SMEAR GRAM STAIN: CPT | Performed by: EMERGENCY MEDICINE

## 2022-05-21 PROCEDURE — 96375 TX/PRO/DX INJ NEW DRUG ADDON: CPT

## 2022-05-21 PROCEDURE — 62270 DX LMBR SPI PNXR: CPT

## 2022-05-21 PROCEDURE — 80048 BASIC METABOLIC PNL TOTAL CA: CPT | Performed by: EMERGENCY MEDICINE

## 2022-05-21 PROCEDURE — 99223 1ST HOSP IP/OBS HIGH 75: CPT | Mod: AI | Performed by: HOSPITALIST

## 2022-05-21 PROCEDURE — U0003 INFECTIOUS AGENT DETECTION BY NUCLEIC ACID (DNA OR RNA); SEVERE ACUTE RESPIRATORY SYNDROME CORONAVIRUS 2 (SARS-COV-2) (CORONAVIRUS DISEASE [COVID-19]), AMPLIFIED PROBE TECHNIQUE, MAKING USE OF HIGH THROUGHPUT TECHNOLOGIES AS DESCRIBED BY CMS-2020-01-R: HCPCS | Performed by: EMERGENCY MEDICINE

## 2022-05-21 PROCEDURE — C9803 HOPD COVID-19 SPEC COLLECT: HCPCS

## 2022-05-21 PROCEDURE — 96367 TX/PROPH/DG ADDL SEQ IV INF: CPT

## 2022-05-21 PROCEDURE — 87040 BLOOD CULTURE FOR BACTERIA: CPT | Performed by: EMERGENCY MEDICINE

## 2022-05-21 PROCEDURE — 84157 ASSAY OF PROTEIN OTHER: CPT | Performed by: EMERGENCY MEDICINE

## 2022-05-21 RX ORDER — NALOXONE HYDROCHLORIDE 0.4 MG/ML
0.4 INJECTION, SOLUTION INTRAMUSCULAR; INTRAVENOUS; SUBCUTANEOUS
Status: DISCONTINUED | OUTPATIENT
Start: 2022-05-21 | End: 2022-05-27 | Stop reason: HOSPADM

## 2022-05-21 RX ORDER — ONDANSETRON 2 MG/ML
4 INJECTION INTRAMUSCULAR; INTRAVENOUS EVERY 30 MIN PRN
Status: DISCONTINUED | OUTPATIENT
Start: 2022-05-21 | End: 2022-05-21

## 2022-05-21 RX ORDER — METOCLOPRAMIDE HYDROCHLORIDE 5 MG/ML
5 INJECTION INTRAMUSCULAR; INTRAVENOUS ONCE
Status: COMPLETED | OUTPATIENT
Start: 2022-05-21 | End: 2022-05-21

## 2022-05-21 RX ORDER — KETOROLAC TROMETHAMINE 30 MG/ML
30 INJECTION, SOLUTION INTRAMUSCULAR; INTRAVENOUS ONCE
Status: COMPLETED | OUTPATIENT
Start: 2022-05-21 | End: 2022-05-21

## 2022-05-21 RX ORDER — AMOXICILLIN 250 MG
2 CAPSULE ORAL 2 TIMES DAILY
Status: DISCONTINUED | OUTPATIENT
Start: 2022-05-21 | End: 2022-05-27 | Stop reason: HOSPADM

## 2022-05-21 RX ORDER — CEFTRIAXONE 2 G/1
2 INJECTION, POWDER, FOR SOLUTION INTRAMUSCULAR; INTRAVENOUS EVERY 24 HOURS
Status: DISCONTINUED | OUTPATIENT
Start: 2022-05-21 | End: 2022-05-21

## 2022-05-21 RX ORDER — LORAZEPAM 0.5 MG/1
0.25 TABLET ORAL DAILY PRN
Status: DISCONTINUED | OUTPATIENT
Start: 2022-05-21 | End: 2022-05-27 | Stop reason: HOSPADM

## 2022-05-21 RX ORDER — MORPHINE SULFATE 4 MG/ML
4 INJECTION, SOLUTION INTRAMUSCULAR; INTRAVENOUS ONCE
Status: COMPLETED | OUTPATIENT
Start: 2022-05-21 | End: 2022-05-21

## 2022-05-21 RX ORDER — LIDOCAINE 40 MG/G
CREAM TOPICAL
Status: DISCONTINUED | OUTPATIENT
Start: 2022-05-21 | End: 2022-05-27 | Stop reason: HOSPADM

## 2022-05-21 RX ORDER — PROCHLORPERAZINE 25 MG
25 SUPPOSITORY, RECTAL RECTAL EVERY 12 HOURS PRN
Status: DISCONTINUED | OUTPATIENT
Start: 2022-05-21 | End: 2022-05-27 | Stop reason: HOSPADM

## 2022-05-21 RX ORDER — SODIUM CHLORIDE 9 MG/ML
INJECTION, SOLUTION INTRAVENOUS CONTINUOUS
Status: DISCONTINUED | OUTPATIENT
Start: 2022-05-21 | End: 2022-05-23

## 2022-05-21 RX ORDER — OXYCODONE HYDROCHLORIDE 5 MG/1
5 TABLET ORAL EVERY 6 HOURS PRN
Status: COMPLETED | OUTPATIENT
Start: 2022-05-21 | End: 2022-05-23

## 2022-05-21 RX ORDER — ACETAMINOPHEN 325 MG/1
650 TABLET ORAL EVERY 4 HOURS PRN
Status: DISCONTINUED | OUTPATIENT
Start: 2022-05-21 | End: 2022-05-22

## 2022-05-21 RX ORDER — ONDANSETRON 4 MG/1
4 TABLET, ORALLY DISINTEGRATING ORAL EVERY 6 HOURS PRN
Status: DISCONTINUED | OUTPATIENT
Start: 2022-05-21 | End: 2022-05-27 | Stop reason: HOSPADM

## 2022-05-21 RX ORDER — CEFTRIAXONE 2 G/1
2 INJECTION, POWDER, FOR SOLUTION INTRAMUSCULAR; INTRAVENOUS EVERY 12 HOURS
Status: DISCONTINUED | OUTPATIENT
Start: 2022-05-21 | End: 2022-05-21

## 2022-05-21 RX ORDER — NALOXONE HYDROCHLORIDE 0.4 MG/ML
0.2 INJECTION, SOLUTION INTRAMUSCULAR; INTRAVENOUS; SUBCUTANEOUS
Status: DISCONTINUED | OUTPATIENT
Start: 2022-05-21 | End: 2022-05-27 | Stop reason: HOSPADM

## 2022-05-21 RX ORDER — DIPHENHYDRAMINE HYDROCHLORIDE 50 MG/ML
25 INJECTION INTRAMUSCULAR; INTRAVENOUS ONCE
Status: COMPLETED | OUTPATIENT
Start: 2022-05-21 | End: 2022-05-21

## 2022-05-21 RX ORDER — HYDROMORPHONE HYDROCHLORIDE 1 MG/ML
0.5 INJECTION, SOLUTION INTRAMUSCULAR; INTRAVENOUS; SUBCUTANEOUS ONCE
Status: COMPLETED | OUTPATIENT
Start: 2022-05-21 | End: 2022-05-21

## 2022-05-21 RX ORDER — PAROXETINE 30 MG/1
30 TABLET, FILM COATED ORAL AT BEDTIME
Status: DISCONTINUED | OUTPATIENT
Start: 2022-05-21 | End: 2022-05-27 | Stop reason: HOSPADM

## 2022-05-21 RX ORDER — BISACODYL 10 MG
10 SUPPOSITORY, RECTAL RECTAL DAILY PRN
Status: DISCONTINUED | OUTPATIENT
Start: 2022-05-21 | End: 2022-05-27 | Stop reason: HOSPADM

## 2022-05-21 RX ORDER — AMOXICILLIN 250 MG
1 CAPSULE ORAL 2 TIMES DAILY
Status: DISCONTINUED | OUTPATIENT
Start: 2022-05-21 | End: 2022-05-27 | Stop reason: HOSPADM

## 2022-05-21 RX ORDER — ONDANSETRON 2 MG/ML
4 INJECTION INTRAMUSCULAR; INTRAVENOUS EVERY 6 HOURS PRN
Status: DISCONTINUED | OUTPATIENT
Start: 2022-05-21 | End: 2022-05-27 | Stop reason: HOSPADM

## 2022-05-21 RX ORDER — PROCHLORPERAZINE MALEATE 5 MG
10 TABLET ORAL EVERY 6 HOURS PRN
Status: DISCONTINUED | OUTPATIENT
Start: 2022-05-21 | End: 2022-05-27 | Stop reason: HOSPADM

## 2022-05-21 RX ORDER — CEFTRIAXONE 2 G/1
2 INJECTION, POWDER, FOR SOLUTION INTRAMUSCULAR; INTRAVENOUS ONCE
Status: COMPLETED | OUTPATIENT
Start: 2022-05-21 | End: 2022-05-21

## 2022-05-21 RX ADMIN — OXYCODONE HYDROCHLORIDE 5 MG: 5 TABLET ORAL at 10:25

## 2022-05-21 RX ADMIN — ACYCLOVIR SODIUM 1000 MG: 50 INJECTION, SOLUTION INTRAVENOUS at 19:36

## 2022-05-21 RX ADMIN — OXYCODONE HYDROCHLORIDE 5 MG: 5 TABLET ORAL at 23:37

## 2022-05-21 RX ADMIN — ACYCLOVIR SODIUM 1000 MG: 50 INJECTION, SOLUTION INTRAVENOUS at 12:22

## 2022-05-21 RX ADMIN — PAROXETINE HYDROCHLORIDE 30 MG: 30 TABLET, FILM COATED ORAL at 21:54

## 2022-05-21 RX ADMIN — HYDROMORPHONE HYDROCHLORIDE 0.5 MG: 1 INJECTION, SOLUTION INTRAMUSCULAR; INTRAVENOUS; SUBCUTANEOUS at 20:14

## 2022-05-21 RX ADMIN — SODIUM CHLORIDE: 9 INJECTION, SOLUTION INTRAVENOUS at 23:41

## 2022-05-21 RX ADMIN — DIPHENHYDRAMINE HYDROCHLORIDE 25 MG: 50 INJECTION, SOLUTION INTRAMUSCULAR; INTRAVENOUS at 06:20

## 2022-05-21 RX ADMIN — VANCOMYCIN HYDROCHLORIDE 2500 MG: 5 INJECTION, POWDER, LYOPHILIZED, FOR SOLUTION INTRAVENOUS at 09:37

## 2022-05-21 RX ADMIN — SODIUM CHLORIDE 1000 ML: 9 INJECTION, SOLUTION INTRAVENOUS at 03:48

## 2022-05-21 RX ADMIN — MORPHINE SULFATE 4 MG: 4 INJECTION, SOLUTION INTRAMUSCULAR; INTRAVENOUS at 03:48

## 2022-05-21 RX ADMIN — KETOROLAC TROMETHAMINE 30 MG: 30 INJECTION, SOLUTION INTRAMUSCULAR; INTRAVENOUS at 19:33

## 2022-05-21 RX ADMIN — OXYCODONE HYDROCHLORIDE 5 MG: 5 TABLET ORAL at 17:11

## 2022-05-21 RX ADMIN — METOCLOPRAMIDE 5 MG: 5 INJECTION, SOLUTION INTRAMUSCULAR; INTRAVENOUS at 06:20

## 2022-05-21 RX ADMIN — ONDANSETRON 4 MG: 2 INJECTION INTRAMUSCULAR; INTRAVENOUS at 03:48

## 2022-05-21 RX ADMIN — MORPHINE SULFATE 4 MG: 4 INJECTION, SOLUTION INTRAMUSCULAR; INTRAVENOUS at 05:44

## 2022-05-21 RX ADMIN — SODIUM CHLORIDE 1000 ML: 9 INJECTION, SOLUTION INTRAVENOUS at 06:20

## 2022-05-21 RX ADMIN — SODIUM CHLORIDE: 9 INJECTION, SOLUTION INTRAVENOUS at 12:21

## 2022-05-21 RX ADMIN — CEFTRIAXONE SODIUM 2 G: 2 INJECTION, POWDER, FOR SOLUTION INTRAMUSCULAR; INTRAVENOUS at 09:06

## 2022-05-21 ASSESSMENT — ACTIVITIES OF DAILY LIVING (ADL)
ADLS_ACUITY_SCORE: 18
ADLS_ACUITY_SCORE: 35
ADLS_ACUITY_SCORE: 18
ADLS_ACUITY_SCORE: 35
ADLS_ACUITY_SCORE: 18
ADLS_ACUITY_SCORE: 18
ADLS_ACUITY_SCORE: 35
ADLS_ACUITY_SCORE: 18

## 2022-05-21 NOTE — CONSULTS
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 Had some initial visual blurring, but is now resolving and headache is improving on acyclovir. Favor to stop ceftri/vanc, continue acyclovir.     Recommendations:  -Stop vancomycin, ceftriaxone; continue acyclovir.   -HIV combo.     Thank you for this consult. ID will continue to follow this patient.   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 Symptoms started 2 days PTA - developed headache with sweats, malaise, subjective fevers/chills/sweats and some bilateral visual blurring. No floaters, areas of blindness. No rashes, cough, sob, abd pain, diarrhea, focal weakness, neck pain. Doesn't recall having chicken pox as a child, has never had shingles. He had presented to the ED day PTA with symptoms, CT unremarkable and discharged but represented as pain continued.      Upon presentation here was perifeb to 100.1. CSF with elevated pro, glu; 367 nucleated cells, 93% lymphs. Gram stain no orgs. Meningitis panel VZV positive. He was started on ceftri/vanc/acyclovir - is feeling better, headache was a 10 but now is 7 and vision is back to normal.     Review of Systems:  10 pt ROS negative except where noted  "above.    Past Medical History:  Past Medical History:   Diagnosis Date     Anxiety      Chest pain     first episode of CP was 2012     Migraines        Allergies:  Allergies   Allergen Reactions     Droperidol      Droperidol      Droperidol Other (See Comments)       Family History:  No family history on file.    Social History:  Social History     Socioeconomic History     Marital status: Single     Spouse name: Not on file     Number of children: Not on file     Years of education: Not on file     Highest education level: Not on file   Occupational History     Not on file   Tobacco Use     Smoking status: Former Smoker     Packs/day: 0.25     Years: 6.00     Pack years: 1.50     Quit date: 2008     Years since quittin.9     Smokeless tobacco: Never Used   Substance and Sexual Activity     Alcohol use: Yes     Comment: binge     Drug use: Not Currently     Types: Cocaine     Comment: cocaine last night     Sexual activity: Never   Other Topics Concern     Parent/sibling w/ CABG, MI or angioplasty before 65F 55M? No   Social History Narrative    ** Merged History Encounter **         ** Data from: 21 Enc Dept:  EMERGENCY DEPT    ** Merged History Encounter **              ** Data from: 11/10/18 Enc Dept:  EMERGENCY DEPT    ** Merged History Encounter **          Social Determinants of Health     Financial Resource Strain: Not on file   Food Insecurity: Not on file   Transportation Needs: Not on file   Physical Activity: Not on file   Stress: Not on file   Social Connections: Not on file   Intimate Partner Violence: Not on file   Housing Stability: Not on file       Physical Exam:  BP (!) 159/92 (BP Location: Right arm, Patient Position: Supine)   Pulse 72   Temp 98.6  F (37  C) (Oral)   Resp 16   Ht 1.803 m (5' 11\")   Wt 95.3 kg (210 lb)   SpO2 95%   BMI 29.29 kg/m       GENERAL:  Well-developed, well-nourished, sitting in bed in no acute distress.   ENT:  Head is normocephalic, " atraumatic. Oropharynx is moist without exudates or ulcers. No thrush.  EYES:  Eyes have anicteric sclerae.    NECK:  Supple.  LUNGS:  Clear to auscultation.  CARDIOVASCULAR:  Regular rate and rhythm with no murmurs, gallops or rubs.  ABDOMEN:  Abd soft, nontender.  EXT: Extremities warm and without edema.  SKIN:  No acute rashes.  Line is in place without any surrounding erythema.  NEUROLOGIC:  Conversational, CUETO    Laboratory Data:  Creatinine   Date Value Ref Range Status   05/21/2022 0.84 0.66 - 1.25 mg/dL Final   05/20/2022 0.57 (L) 0.66 - 1.25 mg/dL Final   08/30/2021 0.65 (L) 0.66 - 1.25 mg/dL Final   04/04/2021 0.63 (L) 0.66 - 1.25 mg/dL Final   04/03/2021 0.66 0.66 - 1.25 mg/dL Final   04/02/2021 0.59 (L) 0.66 - 1.25 mg/dL Final   04/01/2021 0.67 0.66 - 1.25 mg/dL Final   03/31/2021 0.77 0.66 - 1.25 mg/dL Final     WBC   Date Value Ref Range Status   04/04/2021 8.6 4.0 - 11.0 10e9/L Final   04/03/2021 7.2 4.0 - 11.0 10e9/L Final   04/02/2021 9.0 4.0 - 11.0 10e9/L Final   04/01/2021 6.4 4.0 - 11.0 10e9/L Final   03/31/2021 6.1 4.0 - 11.0 10e9/L Final     WBC Count   Date Value Ref Range Status   05/21/2022 9.3 4.0 - 11.0 10e3/uL Final   05/20/2022 9.5 4.0 - 11.0 10e3/uL Final   08/30/2021 9.1 4.0 - 11.0 10e3/uL Final     Hemoglobin   Date Value Ref Range Status   05/21/2022 14.3 13.3 - 17.7 g/dL Final   04/04/2021 13.5 13.3 - 17.7 g/dL Final     Platelet Count   Date Value Ref Range Status   05/21/2022 231 150 - 450 10e3/uL Final   04/04/2021 401 150 - 450 10e9/L Final     Lab Results   Component Value Date     05/21/2022    BUN 12 05/21/2022    CO2 24 05/21/2022     CRP Inflammation   Date Value Ref Range Status   04/04/2021 12.9 (H) 0.0 - 8.0 mg/L Final   04/03/2021 28.1 (H) 0.0 - 8.0 mg/L Final   04/02/2021 63.2 (H) 0.0 - 8.0 mg/L Final   04/01/2021 131.0 (H) 0.0 - 8.0 mg/L Final   03/31/2021 124.0 (H) 0.0 - 8.0 mg/L Final        Micro:  No results for input(s): CULT, SDES in the last 168  hours.    Imaging:  Recent Results (from the past 48 hour(s))   CT Head w/o Contrast    Narrative    CT SCAN OF THE HEAD WITHOUT CONTRAST   5/20/2022 7:20 AM     HISTORY: Headache.    TECHNIQUE:  Axial images of the head and coronal reformations without  IV contrast material. Radiation dose for this scan was reduced using  automated exposure control, adjustment of the mA and/or kV according  to patient size, or iterative reconstruction technique.    COMPARISON: Head CT 4/5/2022.    FINDINGS: There is no evidence of intracranial hemorrhage, mass, acute  infarct or anomaly. The ventricles are normal in size, shape and  configuration. The brain parenchyma and subarachnoid spaces are  normal.     The visualized portions of the sinuses and mastoids appear normal. The  bony calvarium and bones of the skull base appear intact.       Impression    IMPRESSION:   No evidence of acute intracranial hemorrhage, mass, or  herniation.    CRICKET TRAORE MD         SYSTEM ID:  [id number]   CT Head w/o Contrast    Narrative    EXAM: CT HEAD W/O CONTRAST  LOCATION: [facility]  DATE/TIME: 5/21/2022 3:53 AM    INDICATION: Cerebral hemorrhage suspected, headache  COMPARISON: 04/05/2022  TECHNIQUE: Routine CT Head without IV contrast. Multiplanar reformats. Dose reduction techniques were used.    FINDINGS:  INTRACRANIAL CONTENTS: No intracranial hemorrhage, extraaxial collection, or mass effect.  No CT evidence of acute infarct. Normal parenchymal attenuation. Normal ventricles and sulci.     VISUALIZED ORBITS/SINUSES/MASTOIDS: No intraorbital abnormality. No paranasal sinus mucosal disease. No middle ear or mastoid effusion.    BONES/SOFT TISSUES: No acute abnormality.      Impression    IMPRESSION:  1.  No acute intracranial abnormality or significant change compared to the prior study.

## 2022-05-21 NOTE — PROVIDER NOTIFICATION
Notified Dr. Perez that patient's CSF culture came back POSITIVE for Varicella Zoster. Patient is already on acyclovir and ID has been consulted. NO new orders at this time.

## 2022-05-21 NOTE — PHARMACY-ADMISSION MEDICATION HISTORY
Pharmacy Medication History  Admission medication history interview status for the 5/21/2022  admission is complete. See EPIC admission navigator for prior to admission medications     Location of Interview: Patient room  Medication history sources: Patient, Surescripts and Care Everywhere    Significant changes made to the medication list:  Removed: Eliquis, dexamethasone     In the past week, patient estimated taking medication this percent of the time: greater than 90%    Medication reconciliation completed by provider prior to medication history? No    Time spent in this activity: 10 min    Prior to Admission medications    Medication Sig Last Dose Taking? Auth Provider   LORazepam (ATIVAN) 0.5 MG tablet Take 0.25 mg by mouth daily as needed  Past Week at Unknown time Yes Reported, Patient   PARoxetine (PAXIL) 30 MG tablet Take 30 mg by mouth At Bedtime 5/20/2022 at Unknown time Yes Unknown, Entered By History       The information provided in this note is only as accurate as the sources available at the time of update(s)

## 2022-05-21 NOTE — ED PROVIDER NOTES
Care signed out to me by Dr. Hoyos.  Please see her initial ED provider note regarding HPI, ROS, physical exam, and medical decision making.  In brief patient is a 41-year-old male who presents with a ongoing headache for 3 days.  He did have a mild fever here and a lumbar puncture with pending results.    Lab contacted us to note that there were no organisms seen although elevated white blood cells on cell count.  With elevated white blood cell count on cell count we will plan to admit and treat for meningitis.  Higher suspicion for viral meningitis based on the clinical history and presentation although cannot fully rule out a bacterial meningitis.  We will treat with ceftriaxone, vancomycin, and acyclovir at this time.  I did add on a meningitis encephalitis PCR panel to the CSF as well as HSV.  Spoke with Rolando Perez MD of the hospitalist service who agreed to admission.    CT Head w/o Contrast   Final Result   IMPRESSION:   1.  No acute intracranial abnormality or significant change compared to the prior study.        Labs Ordered and Resulted from Time of ED Arrival to Time of ED Departure   GLUCOSE BY METER - Abnormal       Result Value    GLUCOSE BY METER POCT 199 (*)    BASIC METABOLIC PANEL - Abnormal    Sodium 138      Potassium 3.8      Chloride 105      Carbon Dioxide (CO2) 24      Anion Gap 9      Urea Nitrogen 12      Creatinine 0.84      Calcium 8.3 (*)     Glucose 211 (*)     GFR Estimate >90     GLUCOSE CSF - Abnormal    Glucose CSF 99 (*)    PROTEIN TOTAL CSF - Abnormal    Protein total  (*)    COVID-19 VIRUS (CORONAVIRUS) BY PCR - Normal    SARS CoV2 PCR Negative     CBC WITH PLATELETS AND DIFFERENTIAL    WBC Count 9.3      RBC Count 4.92      Hemoglobin 14.3      Hematocrit 43.8      MCV 89      MCH 29.1      MCHC 32.6      RDW 12.5      Platelet Count 231      % Neutrophils 76      % Lymphocytes 20      % Monocytes 4      % Eosinophils 0      % Basophils 0      % Immature Granulocytes 0       NRBCs per 100 WBC 0      Absolute Neutrophils 6.9      Absolute Lymphocytes 1.8      Absolute Monocytes 0.4      Absolute Eosinophils 0.0      Absolute Basophils 0.0      Absolute Immature Granulocytes 0.0      Absolute NRBCs 0.0     CELL COUNT CSF   DIFFERENTIAL CSF   AEROBIC BACTERIAL CULTURE ROUTINE    Gram Stain Result No organisms seen     HERPES SIMPLEX VIRUS 1&2 PCR   MENINGITIS/ENCEPHALITIS PANEL QUAL PCR CSF   CELL COUNT WITH DIFFERENTIAL CSF         ICD-10-CM    1. Acute intractable headache, unspecified headache type  R51.9    2. Meningitis  G03.9        Carlos Hung MD  05/21/22 0823       Carlos Hung MD  05/21/22 0824

## 2022-05-21 NOTE — ED PROVIDER NOTES
"  History     Chief Complaint:  Headache       HPI   Carlos A Connor is a 41 year old male who presents with headache that started 3 days ago.  The headache has been constant and throbbing and affecting his entire head.  He took Tylenol which did not help.  He feels like he has some vision changes where everything looks more yellow than previously.  He does not really appreciate any light sensitivity.  No numbness, weakness or speech changes.  He was on Eliquis previously but is no longer on this.  He has a previous history of injection drug use but says its been several months.  He feels nauseated but has not vomited.  He feels as if he has had a sore throat and sweatiness.  He was seen in the emergency department yesterday and his work-up including labs and an EKG were reassuring with exception of positive cocaine on urine drug screen.  He comes today again because the headache is persistent and he continues to feel unwell.    ROS:  Review of Systems  As noted per HPI.  Remainder of a 10 point review of systems was negative.    Allergies:  Droperodol    Medications:    Ativan  Paxil    Past Medical History:    Past Medical History:   Diagnosis Date     Anxiety      Chest pain      Migraines      Past Surgical History:    No past surgical history on file.     Family History:    Noncontributory    Social History:   reports that he quit smoking about 13 years ago. He has a 1.50 pack-year smoking history. He has never used smokeless tobacco. He reports current alcohol use. He reports previous drug use. Drug: Cocaine.  PCP: No Ref-Primary, Physician     Physical Exam     Patient Vitals for the past 24 hrs:   BP Temp Temp src Pulse Resp SpO2 Height Weight   05/21/22 0224 (!) 162/97 100.1  F (37.8  C) Oral 87 25 97 % 1.803 m (5' 11\") 95.3 kg (210 lb)      Physical Exam  General: Well-nourished, appears to be uncomfortable when I enter the room, mildly warm to touch  Eyes: PERRL, conjunctivae pink no scleral icterus " or conjunctival injection.  Mild photophobia  ENT:  Moist mucus membranes, posterior oropharynx clear without erythema or exudates  Respiratory:  Lungs clear to auscultation bilaterally, no crackles/rubs/wheezes.  Good air movement  CV: Normal rate and rhythm, no murmurs/rubs/gallops  GI:  Abdomen soft and non-distended.  Normoactive BS.  No tenderness, guarding or rebound  Skin: Warm, dry.  No rashes or petechiae  Musculoskeletal: No peripheral edema or calf tenderness  Neuro: Alert and oriented to person/place/time.  Neck supple.  PERRL, EOMI no nystagmus, no aphasia/facial droop/dysarthria, tongue midline, symmetric palatal elevation, normal strength at SCM/trapezius/BUE/BLE, normal coordination to FNF at BUE, normal casual gait, negative romberg, sensation intact to LT over face/BUE/BLE  Psychiatric: Normal affect    Emergency Department Course   ECG:  ECG results from 08/30/21   EKG 12-lead, tracing only     Value    Systolic Blood Pressure     Diastolic Blood Pressure     Ventricular Rate 63    Atrial Rate 63    ME Interval 128    QRS Duration 92        QTc 442    P Axis 27    R AXIS 7    T Axis -3    Interpretation ECG      Sinus rhythm  Minimal voltage criteria for LVH, may be normal variant  ST elevation, consider early repolarization, pericarditis, or injury  Abnormal ECG  When compared with ECG of 26-OCT-2020 08:47,  Left bundle branch block is no longer Present  Confirmed by GENERATED REPORT, COMPUTER ([id number]),  Kalpana Turner ([id number]) on 8/31/2021 2:12:35 AM         Imaging:  CT Head w/o Contrast   Final Result   IMPRESSION:   1.  No acute intracranial abnormality or significant change compared to the prior study.         Report per radiology    Laboratory:  Labs Ordered and Resulted from Time of ED Arrival to Time of ED Departure   GLUCOSE BY METER - Abnormal       Result Value    GLUCOSE BY METER POCT 199 (*)    BASIC METABOLIC PANEL - Abnormal    Sodium 138      Potassium 3.8      Chloride  105      Carbon Dioxide (CO2) 24      Anion Gap 9      Urea Nitrogen 12      Creatinine 0.84      Calcium 8.3 (*)     Glucose 211 (*)     GFR Estimate >90     GLUCOSE CSF - Abnormal    Glucose CSF 99 (*)    PROTEIN TOTAL CSF - Abnormal    Protein total  (*)    COVID-19 VIRUS (CORONAVIRUS) BY PCR - Normal    SARS CoV2 PCR Negative     CBC WITH PLATELETS AND DIFFERENTIAL    WBC Count 9.3      RBC Count 4.92      Hemoglobin 14.3      Hematocrit 43.8      MCV 89      MCH 29.1      MCHC 32.6      RDW 12.5      Platelet Count 231      % Neutrophils 76      % Lymphocytes 20      % Monocytes 4      % Eosinophils 0      % Basophils 0      % Immature Granulocytes 0      NRBCs per 100 WBC 0      Absolute Neutrophils 6.9      Absolute Lymphocytes 1.8      Absolute Monocytes 0.4      Absolute Eosinophils 0.0      Absolute Basophils 0.0      Absolute Immature Granulocytes 0.0      Absolute NRBCs 0.0     CELL COUNT CSF   GRAM STAIN   AEROBIC BACTERIAL CULTURE ROUTINE   CELL COUNT WITH DIFFERENTIAL CSF        Procedures     [facility] Procedure Note          Lumbar Puncture:      Time: 7:36 AM  Performed by: Pretty Hoyos MD  Authorized by: Pretty Hoyos MD    Indications: headache and fever    Consent given by: Patient who states understanding of the procedure being performed after discussing the risks, benefits and alternatives.    Prior to the start of the procedure and with procedural staff participation, I verbally confirmed the patient s identity using two indicators, relevant allergies, that the procedure was appropriate and matched the consent or emergent situation, and that the correct equipment/implants were available. Immediately prior to starting the procedure I conducted the Time Out with the procedural staff and re-confirmed the patient s name, procedure, and site/side. (The Joint Commission universal protocol was followed.) Yes    Under sterile conditions the patient was positioned Sitting, bent forward.  Betadine solution and sterile drapes were utilized.  Local anesthetic at the site: 2 ml of lidocaine 1% without epinephrine from the LP tray  A 21 G  spinal needle was inserted at the L 3-4 interspace.  Opening Pressurewas not checked.  A total of 10mL of clear and colorless spinal fluid was obtained and sent to the laboratory.   After the needle was removed, a bandaid and pressure were applied and the patient was instructed to stay horizontal until the results were back.    Complications:  None    Patient tolerance: Patient tolerated the procedure well with no immediate complications.      Emergency Department Course:       Reviewed:  I reviewed nursing notes, vitals and past medical history    Assessments:  I obtained history and examined the patient as noted above.   I rechecked the patient and explained findings.   I consented the patient for LP.  I performed LP.  Patient rechecked and is sleeping.    Interventions:  Medications   ondansetron (ZOFRAN) injection 4 mg (4 mg Intravenous Given 5/21/22 0348)   0.9% sodium chloride BOLUS (0 mLs Intravenous Stopped 5/21/22 0443)   morphine (PF) injection 4 mg (4 mg Intravenous Given 5/21/22 0348)   morphine (PF) injection 4 mg (4 mg Intravenous Given 5/21/22 0544)   0.9% sodium chloride BOLUS (1,000 mLs Intravenous New Bag 5/21/22 0620)   diphenhydrAMINE (BENADRYL) injection 25 mg (25 mg Intravenous Given 5/21/22 0620)   metoclopramide (REGLAN) injection 5 mg (5 mg Intravenous Given 5/21/22 0620)     Disposition:  Care of the patient was transferred to my colleague Dr. Hung pending LP CSF results and clinical reassessment.     Impression & Plan      Medical Decision Making:  Carlos A Connor is a 41 year old male who returns to the ED today with recurrent and worsening headache and now a fever. Given his history of intravenous drug use, I was concerned about the possibility of a brain abscess. CT was obtained to rule out a new bleed or space occupying lesion and  was negative. Labs were otherwise reassuring. LP was undertaken given the low grade fever and worsening concerning for meningitis. Results are pending at this time. I signed out to my partner Dr. Hung who will followup on these results and disposition the patient appropriately.    Diagnosis:  1. Headache    Discharge Medications:  New Prescriptions    No medications on file        5/21/2022   Pretty Hoyos MD Cho, Amy C, MD  05/21/22 0796

## 2022-05-21 NOTE — H&P
"[facility]  History and Physical   Hospitalist  Rolando Perez MD       Carlos A Connor MRN# [id number]   YOB: 1980 Age: 41 year old      Date of Admission:  5/21/2022         Assessment and Plan:   Carlos A Connor is a 41 year old male with PMH significant for anxiety, panic attack, h/o COVID pneumonia (4/2021), substance abuse (methamphetamine, cocaine) who presented to ED with headache, being admitted for suspected meningitis.    Headache, photophobia, likely meningitis-- probably viral  -admit as inpatient  - temp 100.1 on admission, normal CBC; CSF reportedly with elevated WBC (per ED; final results pending), CSF differential with 93 % lymphocytes; no organisms on gram stain; CSF culture, meningitis/encephalitis panel pending  -CT head unremarkable  -started on vancomycin, Rocephin and acyclovir in ED-- will continue empiric antimicrobials pending final CSF cultures  -consult infectious disease  -PRN pain meds    Elevated blood pressure  -no prior history of hypertension  - BP elevated likely secondary to headache  -will have hydralazine IV PRN for SBP>180    Anxiety disorder  panic disorder  -resume PTA Ativan and Paxil when verified pharmacy    Substance abuse disorder  H/o methamphetamine and cocaine abuse  -urine toxicology positive for cocaine; denies any IV drug use  -counseled on drug cessation  - will get chem dep eval    Clinically Significant Risk Factors Present on Admission          # Hypocalcemia: Ca = 8.3 mg/dL (Ref range: 8.5 - 10.1 mg/dL) and/or iCa = N/A on admission, will replace as needed      # Coagulation Defect: home medication list includes an anticoagulant medication    # Overweight: Estimated body mass index is 29.29 kg/m  as calculated from the following:    Height as of this encounter: 1.803 m (5' 11\").    Weight as of this encounter: 95.3 kg (210 lb).         COVID status: h/o COVID Pneumonia 4/2021; asymptomatic COVID screening on " 5/21/22 was negative    # DVT prophylaxis: mechanical with PCD boots  # Code status: full code    Care plan discussed with ED provider and patient           Primary Care Physician:   No Ref-Primary, Physician None         Chief Complaint:   headache    History is obtained from the patient         History of Present Illness:     Carlos A Connor is a 41 year old male with PMH significant for anxiety, panic attack, h/o COVID pneumonia (4/2021), substance abuse (methamphetamine, cocaine) who presented to ED with headache. He has been having headaches for past three days, was seen in ED on 5/20, head CT was unremarkable; he was discharged home but then presented again with persistent headache. Felt feverish at home but did not measure temperature. Temp here was noted at 100.1; repeat head CT was unremarkable; a LP was done; CSF reportedly with elevated WBC (per ED; final results pending); CSF culture, meningitis/encephalitis panel pending. Was started on vancomycin, Rocephin and acyclovir in ED and is being admitted for suspected meningitis.     In ED patient was seen by Dr Hoyos/Abhilash and hospitalist was requested admission for further evaluation.    The patient denies any chills, rigors, chest pain or shortness of breath. Does report some photophobia. Denies pain abdomen.  No bowel or bladder disturbances.           Past Medical History:     Anxiety  Panic attack  h/o COVID pneumonia (4/2021)  Substance abuse (methamphetamine, cocaine)          Past Surgical History:   No past surgical history on file.           Home Medications:     Prior to Admission Medications   Prescriptions Last Dose Informant Patient Reported? Taking?   LORAZEPAM PO   Yes No   Sig: Take 0.5 mg by mouth daily    LORazepam (ATIVAN PO)  Self Yes No   Sig: Take 0.25 mg by mouth daily as needed    LORazepam (ATIVAN) 0.5 MG tablet  Self Yes No   Sig: Take 0.25 mg by mouth daily 1/2 x 0.5 mg tab   PAROXETINE HCL PO   Yes No   Sig: Take 40 mg by mouth  "daily    PARoxetine (PAXIL) 30 MG tablet  Self Yes No   Sig: Take 30 mg by mouth At Bedtime   PARoxetine HCl (PAXIL PO)   Yes No   Sig: Take 30 mg by mouth daily    apixaban ANTICOAGULANT (ELIQUIS) 2.5 MG tablet   No No   Sig: Take 1 tablet (2.5 mg) by mouth 2 times daily   dexamethasone (DECADRON) 6 MG tablet   No No   Sig: Take 1 tablet (6 mg) by mouth daily      Facility-Administered Medications: None            Allergies:     Allergies   Allergen Reactions     Droperidol      Droperidol      Droperidol Other (See Comments)            Social History:   Carlos A Connor  reports that he quit smoking about 13 years ago. He has a 1.50 pack-year smoking history. He has never used smokeless tobacco. He reports current alcohol use. He reports previous drug use. Drug: Cocaine. Denies any IV drug use.              Family History:   Carlos A Connor family history is not on file.    Family history was reviewed by myself and not pertinent to current presentation.           Review of Systems:   A10 point Review of Systems was done and were negative other than noted in the HPI.             Physical Exam:   Blood pressure (!) 162/95, pulse 73, temperature 98.8  F (37.1  C), temperature source Oral, resp. rate 18, height 1.803 m (5' 11\"), weight 95.3 kg (210 lb), SpO2 94 %.  210 lbs 0 oz        Constitutional: Alert, awake and oriented X 3; looks slightly restless from headache    HEENT: Pupils equal and reactive to light and accomodation, EOMI intact; neck supple no raised JVD or rigidity    Oral cavity: Moist mucosa   Cardiovascular: Normal s1 s2, regular rate and rhythm, no murmur   Lungs: B/l clear to auscultation, no wheezes or crepitations   Abdomen: Soft, nt, nd, no guarding, rigidity or rebound; BS +   LE : No edema   Musculoskeletal: Power 5/5 in all extremities   Neuro: No focal neurological deficits noted, CN II to XII grossly intact   Psychiatry: normal mood and affect  Skin: No obvious skin rashes or " ulcers             Data:   All new lab and imaging data was reviewed in Epic.   Significant labs and imagings include:    normal CBC; CSF reportedly with elevated WBC (per ED; final results pending); CSF culture, meningitis/encephalitis panel pending  -CT head unremarkable  -normal BMP             Rolando Perez MD  Hospitalist

## 2022-05-21 NOTE — ED TRIAGE NOTES
Was seen yesterday for same complain. Headache 10/10, took tylenol last at 1800 and ibuprofen at 0100 without any relief.      Triage Assessment     Row Name 05/21/22 0224       Triage Assessment (Adult)    Airway WDL WDL       Respiratory WDL    Respiratory WDL X;rhythm/pattern    Rhythm/Pattern, Respiratory tachypneic       Skin Circulation/Temperature WDL    Skin Circulation/Temperature WDL WDL       Cardiac WDL    Cardiac WDL WDL       Peripheral/Neurovascular WDL    Peripheral Neurovascular WDL WDL       Cognitive/Neuro/Behavioral WDL    Cognitive/Neuro/Behavioral WDL X  intractable headache -frontal    Mood/Behavior anxious;restless

## 2022-05-21 NOTE — PLAN OF CARE
Summary: New admit with viral meningitis    DATE & TIME: 05/21/22 4465-7714   Cognitive Concerns/ Orientation : Alert and oriented x4- bilingual in English/Welsh   BEHAVIOR & AGGRESSION TOOL COLOR: GREEN  CIWA SCORE: NA   ABNL VS/O2: Slight hypertensive, slight temp- otherwise WDL on RA  MOBILITY: Ind  PAIN MANAGMENT: Oxycodone 5 mg every 6 hours for 2 more doses- just paged MD due to patient having more severe headache  DIET: Regular- tolerating  BOWEL/BLADDER: WDL- continent  ABNL LAB/BG: CSF + for Varicella Zoster- MD aware  DRAIN/DEVICES: IV on L arm infusing NS at 100 ml/hr  TELEMETRY RHYTHM: NA  SKIN: Intact- tattoos  TESTS/PROCEDURES: Had LP this AM  D/C DAY/GOALS/PLACE: Continue on IV antibiotics  OTHER IMPORTANT INFO: Has had headache for 3 days

## 2022-05-21 NOTE — PHARMACY-VANCOMYCIN DOSING SERVICE
Pharmacy Vancomycin Initial Note  Date of Service May 21, 2022  Patient's  1980  41 year old, male    Indication: Meningitis    Current estimated CrCl = Estimated Creatinine Clearance: 137.7 mL/min (based on SCr of 0.84 mg/dL).    Creatinine for last 3 days  2022:  7:09 AM Creatinine 0.57 mg/dL  2022:  3:49 AM Creatinine 0.84 mg/dL    Recent Vancomycin Level(s) for last 3 days  No results found for requested labs within last 72 hours.      Vancomycin IV Administrations (past 72 hours)                   vancomycin 2500 mg in 0.9% NaCl 500 ml intermittent infusion 2,500 mg (mg) 2,500 mg New Bag 22 0937                Nephrotoxins and other renal medications (From now, onward)    Start     Dose/Rate Route Frequency Ordered Stop    22 1800  vancomycin 1250 mg in 0.9% NaCl 250 mL intermittent infusion 1,250 mg         1,250 mg  over 90 Minutes Intravenous EVERY 8 HOURS 22 1038      22 1030  acyclovir (ZOVIRAX) 1,000 mg in sodium chloride 0.9 % 250 mL intermittent infusion         10 mg/kg × 95.3 kg  250 mL/hr over 1 Hours Intravenous EVERY 8 HOURS 22 1008      22 0845  acyclovir (ZOVIRAX) 1,000 mg in sodium chloride 0.9 % 250 mL intermittent infusion         10 mg/kg × 95.3 kg  250 mL/hr over 1 Hours Intravenous ONCE 22 0819      22 0845  vancomycin 2500 mg in 0.9% NaCl 500 ml intermittent infusion 2,500 mg         2,500 mg  over 120 Minutes Intravenous ONCE 22 0832            Contrast Orders - past 72 hours (72h ago, onward)    None                Plan:  1. Start vancomycin  2500 mg IV load then 1250 mg q8h  2. Vancomycin monitoring method: Trough (Method 1 = dosing nomogram)  3. Vancomycin therapeutic monitoring goal: 15-20 mg/L  4. Pharmacy will check vancomycin levels as appropriate in 1-3 Days.    5. Serum creatinine levels will be ordered daily for the first week of therapy and at least twice weekly for subsequent weeks.      Mario Alberto Wong,  RPH

## 2022-05-21 NOTE — PROGRESS NOTES
RECEIVING UNIT ED HANDOFF REVIEW    ED Nurse Handoff Report was reviewed by: Toshia Hansen RN on May 21, 2022 at 8:57 AM

## 2022-05-21 NOTE — PROGRESS NOTES
Brief Hospitalist Cross cover note:    Temp: 98  F (36.7  C) Temp src: Oral BP: (!) 141/85 Pulse: 72   Resp: 16 SpO2: 94 % O2 Device: None (Room air)       Paged that patient has severe HA not improved with oxycodone. Brief chart review indicates patient is being treated for HSV meningitis/encephalitis. Will try one time dose of toradol.        Regina Willson MD  6:58 PM    Paged again about 10/10 HA not relieved by toradol and oxycodone. Also pt has /103 and RN requesting hydralazine. I will order a one time dose of IV dilaudid for HA. BP likely 2/2 HA so no indication to treat. Monitor BP with pain treatment.    Regina Willson MD  8:04 PM

## 2022-05-22 LAB
ANION GAP SERPL CALCULATED.3IONS-SCNC: 5 MMOL/L (ref 3–14)
BASOPHILS # BLD AUTO: 0 10E3/UL (ref 0–0.2)
BASOPHILS NFR BLD AUTO: 0 %
BUN SERPL-MCNC: 9 MG/DL (ref 7–30)
CALCIUM SERPL-MCNC: 8.4 MG/DL (ref 8.5–10.1)
CHLORIDE BLD-SCNC: 104 MMOL/L (ref 94–109)
CO2 SERPL-SCNC: 28 MMOL/L (ref 20–32)
CREAT SERPL-MCNC: 0.6 MG/DL (ref 0.66–1.25)
EOSINOPHIL # BLD AUTO: 0.1 10E3/UL (ref 0–0.7)
EOSINOPHIL NFR BLD AUTO: 1 %
ERYTHROCYTE [DISTWIDTH] IN BLOOD BY AUTOMATED COUNT: 12.1 % (ref 10–15)
GFR SERPL CREATININE-BSD FRML MDRD: >90 ML/MIN/1.73M2
GLUCOSE BLD-MCNC: 116 MG/DL (ref 70–99)
HCT VFR BLD AUTO: 42.3 % (ref 40–53)
HGB BLD-MCNC: 13.9 G/DL (ref 13.3–17.7)
IMM GRANULOCYTES # BLD: 0.1 10E3/UL
IMM GRANULOCYTES NFR BLD: 1 %
LYMPHOCYTES # BLD AUTO: 3.5 10E3/UL (ref 0.8–5.3)
LYMPHOCYTES NFR BLD AUTO: 36 %
MCH RBC QN AUTO: 28.6 PG (ref 26.5–33)
MCHC RBC AUTO-ENTMCNC: 32.9 G/DL (ref 31.5–36.5)
MCV RBC AUTO: 87 FL (ref 78–100)
MONOCYTES # BLD AUTO: 0.8 10E3/UL (ref 0–1.3)
MONOCYTES NFR BLD AUTO: 8 %
NEUTROPHILS # BLD AUTO: 5.3 10E3/UL (ref 1.6–8.3)
NEUTROPHILS NFR BLD AUTO: 54 %
NRBC # BLD AUTO: 0 10E3/UL
NRBC BLD AUTO-RTO: 0 /100
PLATELET # BLD AUTO: 221 10E3/UL (ref 150–450)
POTASSIUM BLD-SCNC: 3.4 MMOL/L (ref 3.4–5.3)
RBC # BLD AUTO: 4.86 10E6/UL (ref 4.4–5.9)
SODIUM SERPL-SCNC: 137 MMOL/L (ref 133–144)
WBC # BLD AUTO: 9.7 10E3/UL (ref 4–11)

## 2022-05-22 PROCEDURE — 85025 COMPLETE CBC W/AUTO DIFF WBC: CPT | Performed by: HOSPITALIST

## 2022-05-22 PROCEDURE — 258N000003 HC RX IP 258 OP 636: Performed by: HOSPITALIST

## 2022-05-22 PROCEDURE — 250N000013 HC RX MED GY IP 250 OP 250 PS 637: Performed by: HOSPITALIST

## 2022-05-22 PROCEDURE — 120N000001 HC R&B MED SURG/OB

## 2022-05-22 PROCEDURE — 250N000011 HC RX IP 250 OP 636: Performed by: HOSPITALIST

## 2022-05-22 PROCEDURE — 36415 COLL VENOUS BLD VENIPUNCTURE: CPT | Performed by: HOSPITALIST

## 2022-05-22 PROCEDURE — 99232 SBSQ HOSP IP/OBS MODERATE 35: CPT | Performed by: HOSPITALIST

## 2022-05-22 PROCEDURE — 80048 BASIC METABOLIC PNL TOTAL CA: CPT | Performed by: HOSPITALIST

## 2022-05-22 PROCEDURE — 999N000128 HC STATISTIC PERIPHERAL IV START W/O US GUIDANCE

## 2022-05-22 RX ORDER — FAMOTIDINE 20 MG/1
20 TABLET, FILM COATED ORAL 2 TIMES DAILY
Status: DISCONTINUED | OUTPATIENT
Start: 2022-05-22 | End: 2022-05-25

## 2022-05-22 RX ORDER — ACETAMINOPHEN 325 MG/1
975 TABLET ORAL EVERY 8 HOURS
Status: COMPLETED | OUTPATIENT
Start: 2022-05-22 | End: 2022-05-24

## 2022-05-22 RX ORDER — HYDRALAZINE HYDROCHLORIDE 20 MG/ML
10 INJECTION INTRAMUSCULAR; INTRAVENOUS EVERY 4 HOURS PRN
Status: DISCONTINUED | OUTPATIENT
Start: 2022-05-22 | End: 2022-05-25

## 2022-05-22 RX ORDER — LORAZEPAM 2 MG/ML
1 INJECTION INTRAMUSCULAR ONCE
Status: COMPLETED | OUTPATIENT
Start: 2022-05-22 | End: 2022-05-22

## 2022-05-22 RX ORDER — KETOROLAC TROMETHAMINE 15 MG/ML
15 INJECTION, SOLUTION INTRAMUSCULAR; INTRAVENOUS EVERY 6 HOURS PRN
Status: DISCONTINUED | OUTPATIENT
Start: 2022-05-22 | End: 2022-05-24

## 2022-05-22 RX ADMIN — PROCHLORPERAZINE EDISYLATE 10 MG: 5 INJECTION INTRAMUSCULAR; INTRAVENOUS at 02:49

## 2022-05-22 RX ADMIN — PAROXETINE HYDROCHLORIDE 30 MG: 30 TABLET, FILM COATED ORAL at 21:33

## 2022-05-22 RX ADMIN — ACYCLOVIR SODIUM 1000 MG: 50 INJECTION, SOLUTION INTRAVENOUS at 03:33

## 2022-05-22 RX ADMIN — ACETAMINOPHEN 975 MG: 325 TABLET ORAL at 17:57

## 2022-05-22 RX ADMIN — ACYCLOVIR SODIUM 1000 MG: 50 INJECTION, SOLUTION INTRAVENOUS at 20:14

## 2022-05-22 RX ADMIN — FAMOTIDINE 20 MG: 20 TABLET ORAL at 21:33

## 2022-05-22 RX ADMIN — FAMOTIDINE 20 MG: 20 TABLET ORAL at 10:52

## 2022-05-22 RX ADMIN — ACETAMINOPHEN 975 MG: 325 TABLET ORAL at 10:52

## 2022-05-22 RX ADMIN — KETOROLAC TROMETHAMINE 15 MG: 15 INJECTION, SOLUTION INTRAMUSCULAR; INTRAVENOUS at 21:33

## 2022-05-22 RX ADMIN — SODIUM CHLORIDE: 9 INJECTION, SOLUTION INTRAVENOUS at 21:39

## 2022-05-22 RX ADMIN — ACYCLOVIR SODIUM 1000 MG: 50 INJECTION, SOLUTION INTRAVENOUS at 13:02

## 2022-05-22 RX ADMIN — LORAZEPAM 1 MG: 2 INJECTION INTRAMUSCULAR; INTRAVENOUS at 03:49

## 2022-05-22 ASSESSMENT — ACTIVITIES OF DAILY LIVING (ADL)
ADLS_ACUITY_SCORE: 18

## 2022-05-22 NOTE — PLAN OF CARE
Goal Outcome Evaluation:                    Summary: New admit with viral meningitis    DATE & TIME: 5/21/22-5/22/22 [id number]  Cognitive Concerns/ Orientation : Alert and oriented x4- bilingual in English/Sammarinese   BEHAVIOR & AGGRESSION TOOL COLOR: GREEN  CIWA SCORE: NA   ABNL VS/O2: /103 (MD aware, pain intervention helped), other VSS on RA  MOBILITY: Ind  PAIN MANAGMENT: Oxycodone 5 mg x2.Toradol one time dose given with no relief. IV dilaudid one time dose given with relief. Compazine given per MD for pain- no relief. 1mg Ativan one time dose given with some relief. Cold applied.  DIET: Regular- tolerating  BOWEL/BLADDER: WDL- continent  ABNL LAB/BG: CSF + for Varicella Zoster- MD aware  DRAIN/DEVICES: IV on L arm infusing NS at 100 ml/hr  TELEMETRY RHYTHM: NA  SKIN: Intact- tattoos  TESTS/PROCEDURES: Had LP yesterday  D/C DAY/GOALS/PLACE: Continue on IV antibiotics  OTHER IMPORTANT INFO: Has had headache for 4 days

## 2022-05-22 NOTE — PROGRESS NOTES
"INFECTIOUS DISEASES PROGRESS NOTE    Assessment:  Patient with PMH polysubstance use admitted 5/20 with headache, malaise - found to have VZV meningitis. Had some initial visual blurring, but is now resolving and headache is slowly improving on acyclovir.     Recommendations:  -Continue acyclovir.   -HIV combo pending    ID will continue to follow this patient.   Lilly Bhatt MD    Patient Active Problem List   Diagnosis Code     Anxiety F41.9     Panic attack F41.0     CARDIOVASCULAR SCREENING; LDL GOAL LESS THAN 160 Z13.6     Overweight (BMI 25.0-29.9) E66.3     Health Care Home Z76.89     Migraine G43.909     Cocaine abuse (H) F14.10     Substance abuse (H) F19.10     Methamphetamine abuse (H) F15.10     TONNY (generalized anxiety disorder) F41.1     Meningitis due to herpes zoster virus B02.1       -------------------------------------------------------------------------------------------------------------------------------------------------------------------------  Subjective/Interval events:  -Afeb  Feeling a little better today, but headache still present. Is about an 8 today. No visual changes, no rashes.     Physical Exam:  BP (!) 141/85 (BP Location: Right arm)   Pulse 62   Temp 98.6  F (37  C) (Oral)   Resp 16   Ht 1.803 m (5' 11\")   Wt 95.3 kg (210 lb)   SpO2 97%   BMI 29.29 kg/m       GENERAL:  nad  ENT:  Head is normocephalic, atraumatic.   EYES:  Eyes have anicteric sclerae.    NECK:  Supple.  SKIN:  No acute rashes.    NEUROLOGIC:  Conversational, CUETO    Laboratory Data:  Creatinine   Date Value Ref Range Status   05/21/2022 0.84 0.66 - 1.25 mg/dL Final   05/20/2022 0.57 (L) 0.66 - 1.25 mg/dL Final   08/30/2021 0.65 (L) 0.66 - 1.25 mg/dL Final   04/04/2021 0.63 (L) 0.66 - 1.25 mg/dL Final   04/03/2021 0.66 0.66 - 1.25 mg/dL Final   04/02/2021 0.59 (L) 0.66 - 1.25 mg/dL Final   04/01/2021 0.67 0.66 - 1.25 mg/dL Final   03/31/2021 0.77 0.66 - 1.25 mg/dL Final     WBC   Date Value Ref Range " Status   04/04/2021 8.6 4.0 - 11.0 10e9/L Final   04/03/2021 7.2 4.0 - 11.0 10e9/L Final   04/02/2021 9.0 4.0 - 11.0 10e9/L Final   04/01/2021 6.4 4.0 - 11.0 10e9/L Final   03/31/2021 6.1 4.0 - 11.0 10e9/L Final     WBC Count   Date Value Ref Range Status   05/21/2022 9.3 4.0 - 11.0 10e3/uL Final   05/20/2022 9.5 4.0 - 11.0 10e3/uL Final   08/30/2021 9.1 4.0 - 11.0 10e3/uL Final     Hemoglobin   Date Value Ref Range Status   05/21/2022 14.3 13.3 - 17.7 g/dL Final   04/04/2021 13.5 13.3 - 17.7 g/dL Final     Platelet Count   Date Value Ref Range Status   05/21/2022 231 150 - 450 10e3/uL Final   04/04/2021 401 150 - 450 10e9/L Final     Lab Results   Component Value Date     05/21/2022    BUN 12 05/21/2022    CO2 24 05/21/2022     CRP Inflammation   Date Value Ref Range Status   04/04/2021 12.9 (H) 0.0 - 8.0 mg/L Final   04/03/2021 28.1 (H) 0.0 - 8.0 mg/L Final   04/02/2021 63.2 (H) 0.0 - 8.0 mg/L Final   04/01/2021 131.0 (H) 0.0 - 8.0 mg/L Final   03/31/2021 124.0 (H) 0.0 - 8.0 mg/L Final        Micro:  No results for input(s): CULT, SDES in the last 168 hours.    Imaging:  Recent Results (from the past 48 hour(s))   CT Head w/o Contrast    Narrative    CT SCAN OF THE HEAD WITHOUT CONTRAST   5/20/2022 7:20 AM     HISTORY: Headache.    TECHNIQUE:  Axial images of the head and coronal reformations without  IV contrast material. Radiation dose for this scan was reduced using  automated exposure control, adjustment of the mA and/or kV according  to patient size, or iterative reconstruction technique.    COMPARISON: Head CT 4/5/2022.    FINDINGS: There is no evidence of intracranial hemorrhage, mass, acute  infarct or anomaly. The ventricles are normal in size, shape and  configuration. The brain parenchyma and subarachnoid spaces are  normal.     The visualized portions of the sinuses and mastoids appear normal. The  bony calvarium and bones of the skull base appear intact.       Impression    IMPRESSION:   No  evidence of acute intracranial hemorrhage, mass, or  herniation.    CRICKET TRAORE MD         SYSTEM ID:  [id number]   CT Head w/o Contrast    Narrative    EXAM: CT HEAD W/O CONTRAST  LOCATION: [facility]  DATE/TIME: 5/21/2022 3:53 AM    INDICATION: Cerebral hemorrhage suspected, headache  COMPARISON: 04/05/2022  TECHNIQUE: Routine CT Head without IV contrast. Multiplanar reformats. Dose reduction techniques were used.    FINDINGS:  INTRACRANIAL CONTENTS: No intracranial hemorrhage, extraaxial collection, or mass effect.  No CT evidence of acute infarct. Normal parenchymal attenuation. Normal ventricles and sulci.     VISUALIZED ORBITS/SINUSES/MASTOIDS: No intraorbital abnormality. No paranasal sinus mucosal disease. No middle ear or mastoid effusion.    BONES/SOFT TISSUES: No acute abnormality.      Impression    IMPRESSION:  1.  No acute intracranial abnormality or significant change compared to the prior study.

## 2022-05-22 NOTE — PLAN OF CARE
Summary: New admit with viral meningitis    DATE & TIME: 5/22/22 1336-4853  Cognitive Concerns/ Orientation : Alert and oriented x4- bilingual in English/Cypriot   BEHAVIOR & AGGRESSION TOOL COLOR: GREEN  CIWA SCORE: NA   ABNL VS/O2: Mild hypertensive, otherwise VSS on RA  MOBILITY: Ind  PAIN MANAGMENT: Scheduled tylenol and ice packs to head helps with headache  DIET: Regular- tolerating  BOWEL/BLADDER: WDL- continent  ABNL LAB/BG: CSF + for Varicella Zoster- MD aware  DRAIN/DEVICES: New IV on L hand infusing NS at 150 ml/hr  TELEMETRY RHYTHM: NA  SKIN: Intact- tattoos  TESTS/PROCEDURES: Had LP 2 days ago  D/C DAY/GOALS/PLACE: Continue on IV antibiotics  OTHER IMPORTANT INFO: Patient did not complain of pain until evening

## 2022-05-22 NOTE — PROGRESS NOTES
MD Notification    Notified Person: MD    Notified Person Name: Regina Willson    Notification Date/Time: 5/21/22 1955    Notification Interaction: [employer] Webpaging    Purpose of Notification: Pt /103, requesting PRN hydralazine order.  Pt HA 10/10 not relieved by one time dose Toradol.    Orders Received: Pending    Comments:

## 2022-05-22 NOTE — PROVIDER NOTIFICATION
Notified Dr. Willson that patient was experiencing more of a severe headache- even after the 5mg of oxycodone. MD ordered one time dose of Toradol IV.

## 2022-05-22 NOTE — PROGRESS NOTES
[facility]  Medicine Progress Note - Hospitalist Service    Date of Admission:  5/21/2022    Assessment & Plan            Carlos A Connor is a 41 year old male with PMH significant for anxiety, panic attack, h/o COVID pneumonia (4/2021), substance abuse (methamphetamine, cocaine) who presented to ED with headache, LP was done and finding consistent with viral meningitis and was admitted for further management.     Headache, photophobia, likely VZV- meningitis   Temp 100.1 on admission, normal CBC; CSF reportedly with elevated WBC (per ED; final results pending), CSF differential with 93 % lymphocytes; and elevated protein, no organisms on gram stain; CSF culture, meningitis/encephalitis panel sent, started on IV vancomycin Rocephin and acyclovir and admitted.  -CSF meningitis/encephalitis panel showed positive varicella-zoster virus.  -Evaluated by ID, IV antibiotics discontinued and continuing with IV acyclovir only.  -Ongoing headache, not consistent with post spinal headache, pain management-scheduled Tylenol, as needed Toradol and oxycodone.  Increase IV fluid.  -CT head unremarkable   -Follow final cultures     Elevated blood pressure  No prior history of hypertension.  Suspect related to headache  - hydralazine IV PRN available for elevated blood pressure with parameters     Anxiety disorder  panic disorder  -resumed PTA Paxil and Ativan as needed     Substance abuse disorder  H/o methamphetamine and cocaine abuse  -urine toxicology positive for cocaine; denies any IV drug use  -counseled on drug cessation  -chem dep eval    Obesity: BMI 29      COVID-19 screening PCR negative on admission       Diet: Combination Diet Regular Diet Adult    DVT Prophylaxis: Pneumatic Compression Devices  Melara Catheter: Not present  Central Lines: None  Cardiac Monitoring: None  Code Status: Full Code      Disposition Plan   Expected Discharge: 05/24/2022     Anticipated discharge location:   "Awaiting care coordination huddle  Delays:              The patient's care was discussed with the Bedside Nurse and Patient.    Alex Rosenbaum MD  Hospitalist Service  [facility]  Securely message with the Vocera Web Console (learn more here)  Text page via [employer] Paging/Directory       Clinically Significant Risk Factors Present on Admission             # Overweight: Estimated body mass index is 29.29 kg/m  as calculated from the following:    Height as of this encounter: 1.803 m (5' 11\").    Weight as of this encounter: 95.3 kg (210 lb).      ______________________________________________________________________    Interval History   Chart reviewed and discussed with nursing staff and evaluated patient this morning.  Overnight events reviewed, received IV Toradol and hydromorphone for headache but continues to have ongoing headache 8/10 currently, reports 10/10 at presentation.  Denies increasing headache with position change.  Denies neck pain or stiffness.  Still has blurry vision but no diplopia, photophobia.  No nausea.  -T-max 100.4 overnight.  Was hypertensive overnight, blood pressure is high is 181/103, is improved this a.m.    Data reviewed today: I reviewed all medications, new labs and imaging results over the last 24 hours. I personally reviewed no images or EKG's today.    Physical Exam   Vital Signs: Temp: 98.6  F (37  C) Temp src: Oral BP: (!) 141/85 Pulse: 62   Resp: 16 SpO2: 97 % O2 Device: None (Room air)    Weight: 210 lbs 0 oz    General: AAOx3, very pleasant, appears comfortable.  HEENT: Slight conjunctival congestion otherwise PERRLA EOMI. Mucosa moist.  No neck stiffness.  Lungs: Bilateral equal air entry. Clear to auscultation, normal work of breathing.   CVS: S1S2 regular, no tachycardia or murmur.   Abdomen: Soft, NT, ND. BS heard.  MSK: No edema or deformities.  Neuro: AAOX3. CN 2-12 normal. Strength symmetrical.  Skin: No rash.       Data   Recent Labs   Lab " 05/22/22  0756 05/21/22  0349 05/21/22  0233 05/20/22  0709   WBC 9.7 9.3  --  9.5   HGB 13.9 14.3  --  14.7   MCV 87 89  --  86    231  --  240   NA  --  138  --  135   POTASSIUM  --  3.8  --  3.7   CHLORIDE  --  105  --  102   CO2  --  24  --  24   BUN  --  12  --  10   CR  --  0.84  --  0.57*   ANIONGAP  --  9  --  9   HORTENSIA  --  8.3*  --  9.1   GLC  --  211* 199* 138*   ALBUMIN  --   --   --  3.6   PROTTOTAL  --   --   --  7.5   BILITOTAL  --   --   --  1.5*   ALKPHOS  --   --   --  86   ALT  --   --   --  62   AST  --   --   --  20     No results found for this or any previous visit (from the past 24 hour(s)).  Medications     sodium chloride 100 mL/hr at 05/21/22 2341       acetaminophen  975 mg Oral Q8H     acyclovir (ZOVIRAX) IV  10 mg/kg Intravenous Q8H     famotidine  20 mg Oral BID     PARoxetine  30 mg Oral At Bedtime     senna-docusate  1 tablet Oral BID    Or     senna-docusate  2 tablet Oral BID     sodium chloride (PF)  3 mL Intracatheter Q8H

## 2022-05-23 LAB
ALBUMIN SERPL-MCNC: 3.4 G/DL (ref 3.4–5)
ALP SERPL-CCNC: 73 U/L (ref 40–150)
ALT SERPL W P-5'-P-CCNC: 68 U/L (ref 0–70)
ANION GAP SERPL CALCULATED.3IONS-SCNC: 6 MMOL/L (ref 3–14)
AST SERPL W P-5'-P-CCNC: 32 U/L (ref 0–45)
BASOPHILS # BLD AUTO: 0 10E3/UL (ref 0–0.2)
BASOPHILS NFR BLD AUTO: 1 %
BILIRUB SERPL-MCNC: 1 MG/DL (ref 0.2–1.3)
BUN SERPL-MCNC: 8 MG/DL (ref 7–30)
CALCIUM SERPL-MCNC: 8.6 MG/DL (ref 8.5–10.1)
CHLORIDE BLD-SCNC: 105 MMOL/L (ref 94–109)
CO2 SERPL-SCNC: 27 MMOL/L (ref 20–32)
CREAT SERPL-MCNC: 0.65 MG/DL (ref 0.66–1.25)
EOSINOPHIL # BLD AUTO: 0.3 10E3/UL (ref 0–0.7)
EOSINOPHIL NFR BLD AUTO: 3 %
ERYTHROCYTE [DISTWIDTH] IN BLOOD BY AUTOMATED COUNT: 12.4 % (ref 10–15)
GFR SERPL CREATININE-BSD FRML MDRD: >90 ML/MIN/1.73M2
GLUCOSE BLD-MCNC: 124 MG/DL (ref 70–99)
HCT VFR BLD AUTO: 42.7 % (ref 40–53)
HGB BLD-MCNC: 14.3 G/DL (ref 13.3–17.7)
HIV 1+2 AB+HIV1 P24 AG SERPL QL IA: NONREACTIVE
IMM GRANULOCYTES # BLD: 0 10E3/UL
IMM GRANULOCYTES NFR BLD: 1 %
LYMPHOCYTES # BLD AUTO: 2.5 10E3/UL (ref 0.8–5.3)
LYMPHOCYTES NFR BLD AUTO: 29 %
MCH RBC QN AUTO: 28.9 PG (ref 26.5–33)
MCHC RBC AUTO-ENTMCNC: 33.5 G/DL (ref 31.5–36.5)
MCV RBC AUTO: 86 FL (ref 78–100)
MONOCYTES # BLD AUTO: 0.8 10E3/UL (ref 0–1.3)
MONOCYTES NFR BLD AUTO: 9 %
NEUTROPHILS # BLD AUTO: 5 10E3/UL (ref 1.6–8.3)
NEUTROPHILS NFR BLD AUTO: 57 %
NRBC # BLD AUTO: 0 10E3/UL
NRBC BLD AUTO-RTO: 0 /100
PLATELET # BLD AUTO: 221 10E3/UL (ref 150–450)
POTASSIUM BLD-SCNC: 3.5 MMOL/L (ref 3.4–5.3)
PROT SERPL-MCNC: 7 G/DL (ref 6.8–8.8)
RBC # BLD AUTO: 4.95 10E6/UL (ref 4.4–5.9)
SODIUM SERPL-SCNC: 138 MMOL/L (ref 133–144)
WBC # BLD AUTO: 8.7 10E3/UL (ref 4–11)

## 2022-05-23 PROCEDURE — 250N000013 HC RX MED GY IP 250 OP 250 PS 637: Performed by: HOSPITALIST

## 2022-05-23 PROCEDURE — 80053 COMPREHEN METABOLIC PANEL: CPT | Performed by: HOSPITALIST

## 2022-05-23 PROCEDURE — 36415 COLL VENOUS BLD VENIPUNCTURE: CPT | Performed by: HOSPITALIST

## 2022-05-23 PROCEDURE — 258N000003 HC RX IP 258 OP 636: Performed by: HOSPITALIST

## 2022-05-23 PROCEDURE — 120N000001 HC R&B MED SURG/OB

## 2022-05-23 PROCEDURE — 250N000011 HC RX IP 250 OP 636: Performed by: HOSPITALIST

## 2022-05-23 PROCEDURE — 85004 AUTOMATED DIFF WBC COUNT: CPT | Performed by: HOSPITALIST

## 2022-05-23 PROCEDURE — 99232 SBSQ HOSP IP/OBS MODERATE 35: CPT | Performed by: HOSPITALIST

## 2022-05-23 PROCEDURE — 999N000216 HC STATISTIC ADULT CD FACE TO FACE-NO CHRG

## 2022-05-23 RX ADMIN — FAMOTIDINE 20 MG: 20 TABLET ORAL at 08:35

## 2022-05-23 RX ADMIN — KETOROLAC TROMETHAMINE 15 MG: 15 INJECTION, SOLUTION INTRAMUSCULAR; INTRAVENOUS at 16:30

## 2022-05-23 RX ADMIN — FAMOTIDINE 20 MG: 20 TABLET ORAL at 21:29

## 2022-05-23 RX ADMIN — ACETAMINOPHEN 975 MG: 325 TABLET ORAL at 08:35

## 2022-05-23 RX ADMIN — PAROXETINE HYDROCHLORIDE 30 MG: 30 TABLET, FILM COATED ORAL at 21:29

## 2022-05-23 RX ADMIN — SENNOSIDES AND DOCUSATE SODIUM 1 TABLET: 50; 8.6 TABLET ORAL at 08:35

## 2022-05-23 RX ADMIN — KETOROLAC TROMETHAMINE 15 MG: 15 INJECTION, SOLUTION INTRAMUSCULAR; INTRAVENOUS at 09:07

## 2022-05-23 RX ADMIN — ACETAMINOPHEN 975 MG: 325 TABLET ORAL at 16:21

## 2022-05-23 RX ADMIN — SODIUM CHLORIDE: 9 INJECTION, SOLUTION INTRAVENOUS at 03:23

## 2022-05-23 RX ADMIN — ACYCLOVIR SODIUM 1000 MG: 50 INJECTION, SOLUTION INTRAVENOUS at 11:16

## 2022-05-23 RX ADMIN — OXYCODONE HYDROCHLORIDE 5 MG: 5 TABLET ORAL at 13:54

## 2022-05-23 RX ADMIN — SENNOSIDES AND DOCUSATE SODIUM 1 TABLET: 50; 8.6 TABLET ORAL at 21:29

## 2022-05-23 RX ADMIN — ACYCLOVIR SODIUM 1000 MG: 50 INJECTION, SOLUTION INTRAVENOUS at 03:22

## 2022-05-23 RX ADMIN — ACYCLOVIR SODIUM 1000 MG: 50 INJECTION, SOLUTION INTRAVENOUS at 19:09

## 2022-05-23 RX ADMIN — ACETAMINOPHEN 975 MG: 325 TABLET ORAL at 01:14

## 2022-05-23 ASSESSMENT — ACTIVITIES OF DAILY LIVING (ADL)
ADLS_ACUITY_SCORE: 18

## 2022-05-23 NOTE — PROGRESS NOTES
[facility]  Medicine Progress Note - Hospitalist Service    Date of Admission:  5/21/2022    Assessment & Plan            Carlos A Connor is a 41 year old male with PMH significant for anxiety, panic attack, h/o COVID pneumonia (4/2021), substance abuse (methamphetamine, cocaine) who presented to ED with headache, LP was done and finding consistent with viral meningitis and was admitted for further management.     Headache, photophobia, likely VZV- meningitis   Temp 100.1 on admission, normal CBC; CSF reportedly with elevated WBC (per ED; final results pending), CSF differential with 93 % lymphocytes; and elevated protein, no organisms on gram stain; CSF culture, meningitis/encephalitis panel sent, started on IV vancomycin Rocephin and acyclovir and admitted.  -CSF meningitis/encephalitis panel showed positive varicella-zoster virus.  -Evaluated by ID, IV antibiotics discontinued and continuing with IV acyclovir only. Appreciate recommendation.   -Headache/visual symptoms improved.  Pain management-scheduled Tylenol, as needed Toradol and oxycodone. Discontinue IVF and monitor.   -CT head unremarkable   -Follow final cultures     Elevated blood pressure  No prior history of hypertension.  Suspect related to headache  - hydralazine IV PRN available for elevated blood pressure with parameters     Anxiety disorder  panic disorder  -resumed PTA Paxil and Ativan as needed     Substance abuse disorder  H/o methamphetamine and cocaine abuse  -urine toxicology positive for cocaine; denies any IV drug use  -counseled on drug cessation  -chem dep eval    Obesity: BMI 29      COVID-19 screening PCR negative on admission       Diet: Combination Diet Regular Diet Adult    DVT Prophylaxis: Pneumatic Compression Devices  Melara Catheter: Not present  Central Lines: None  Cardiac Monitoring: None  Code Status: Full Code      Disposition Plan   Expected Discharge: 05/24/2022  Needs 10-14 days of IV  "antiviral, underlying h/o durg use, likely needs to complete antiviral in hospital.   Anticipated discharge location:  Awaiting care coordination huddle  Delays:            The patient's care was discussed with the Bedside Nurse and Patient.    Alex Rosenbaum MD  Hospitalist Service  [facility]  Securely message with the Vocera Web Console (learn more here)  Text page via [employer] Paging/Directory       Clinically Significant Risk Factors Present on Admission             # Overweight: Estimated body mass index is 29.29 kg/m  as calculated from the following:    Height as of this encounter: 1.803 m (5' 11\").    Weight as of this encounter: 95.3 kg (210 lb).      ______________________________________________________________________    Interval History     Patient was evaluated this morning.  Ongoing headache but has improved since admission.  Patient also feels his vision is better.  Low-grade fever of 100.6 this morning despite being on a scheduled Tylenol 975 mg 3 times daily.  Denies nausea, neck stiffness, dizziness. No Diarrhea.  No chest pain, dyspnea.  Remains hypertensive although systolic blood pressures now in 140s.    Data reviewed today: I reviewed all medications, new labs and imaging results over the last 24 hours. I personally reviewed no images or EKG's today.    Physical Exam   Vital Signs: Temp: (!) 100.6  F (38.1  C) Temp src: Oral BP: (!) 146/94 Pulse: 64   Resp: 18 SpO2: 98 % O2 Device: None (Room air)    Weight: 210 lbs 0 oz    General: AAOx3, very pleasant, appears comfortable.  HEENT: Conjunctival congestion improved, PERRLA EOMI. Mucosa moist.  No neck stiffness.  Lungs: Bilateral equal air entry. Clear to auscultation, normal work of breathing.   CVS: S1S2 regular, no tachycardia or murmur.   Abdomen: Soft, NT, ND. BS heard.  MSK: No edema or deformities.  Neuro: AAOX3. CN 2-12 normal. Strength symmetrical.  Skin: No rash.       Data   Recent Labs   Lab " 05/23/22  0827 05/22/22  0756 05/21/22  0349 05/21/22  0233 05/20/22  0709   WBC 8.7 9.7 9.3  --  9.5   HGB 14.3 13.9 14.3  --  14.7   MCV 86 87 89  --  86    221 231  --  240    137 138  --  135   POTASSIUM 3.5 3.4 3.8  --  3.7   CHLORIDE 105 104 105  --  102   CO2 27 28 24  --  24   BUN 8 9 12  --  10   CR 0.65* 0.60* 0.84  --  0.57*   ANIONGAP 6 5 9  --  9   HORTENSIA 8.6 8.4* 8.3*  --  9.1   * 116* 211*   < > 138*   ALBUMIN 3.4  --   --   --  3.6   PROTTOTAL 7.0  --   --   --  7.5   BILITOTAL 1.0  --   --   --  1.5*   ALKPHOS 73  --   --   --  86   ALT 68  --   --   --  62   AST 32  --   --   --  20    < > = values in this interval not displayed.     No results found for this or any previous visit (from the past 24 hour(s)).  Medications       acetaminophen  975 mg Oral Q8H     acyclovir (ZOVIRAX) IV  10 mg/kg Intravenous Q8H     famotidine  20 mg Oral BID     PARoxetine  30 mg Oral At Bedtime     senna-docusate  1 tablet Oral BID    Or     senna-docusate  2 tablet Oral BID     sodium chloride (PF)  3 mL Intracatheter Q8H

## 2022-05-23 NOTE — CONSULTS
5/23/2022    CD consult completed.  Spoke with pt via bedside phone to screen for services. Pt was open to receiving resources. Will send a list of resources over to unit SW. At this time, pt does not have insurance, which will be a barrier to access services. Pt also needing 10-14 days of IV abx per chart review.     Nazanin Moya [facility] Medical [email or web address]  Direct phone: [phone]

## 2022-05-23 NOTE — PLAN OF CARE
[id number]       Summary: Meningitis d/t VZV  Orientation: A&Ox4  Behavior Color: Green  CIWA: na   VS/O2: VSS on RA, ex HTN & slight fever 100.6, PRN tylenol given.   Mobility: Ind in room   Pain: Headache managed with Toradol x1, Oxy x1 & scheduled tylenol   Diet: Reg  B/B: Cont B/B, AUOP  ABNL LAB: na  Drain/Device: L PIV SL   Tele: na  Skin: Dry & intact   Test/Procedures: Chem dep consult, ID following.   D/C Goal/Place: Needs 10-14 days of IV antiviral meds, likely needs to complete in hospital

## 2022-05-23 NOTE — PLAN OF CARE
Goal Outcome Evaluation:                    Summary: New admit with viral meningitis    DATE & TIME: 5/22/22-5/23/22 [id number]  Cognitive Concerns/ Orientation : Alert and oriented x4- bilingual in English/Grenadian   BEHAVIOR & AGGRESSION TOOL COLOR: GREEN  CIWA SCORE: NA   ABNL VS/O2: Mild hypertensive, otherwise VSS on RA  MOBILITY: Ind  PAIN MANAGMENT: Scheduled tylenol and ice packs to head helps with headache. PRN Toradol given x1 with some relief  DIET: Regular- tolerating  BOWEL/BLADDER: WDL- continent  ABNL LAB/BG: CSF + for Varicella Zoster- MD aware  DRAIN/DEVICES: New IV on L hand infusing NS at 150 ml/hr  TELEMETRY RHYTHM: NA  SKIN: Intact- tattoos  TESTS/PROCEDURES: Had LP 2 days ago  D/C DAY/GOALS/PLACE: Continue on IV antibiotics  OTHER IMPORTANT INFO:

## 2022-05-24 PROBLEM — N17.9 ACUTE KIDNEY FAILURE, UNSPECIFIED (H): Status: ACTIVE | Noted: 2022-05-24

## 2022-05-24 LAB
ANION GAP SERPL CALCULATED.3IONS-SCNC: 7 MMOL/L (ref 3–14)
BUN SERPL-MCNC: 20 MG/DL (ref 7–30)
CALCIUM SERPL-MCNC: 8.7 MG/DL (ref 8.5–10.1)
CHLORIDE BLD-SCNC: 108 MMOL/L (ref 94–109)
CO2 SERPL-SCNC: 26 MMOL/L (ref 20–32)
CREAT SERPL-MCNC: 1.65 MG/DL (ref 0.66–1.25)
GFR SERPL CREATININE-BSD FRML MDRD: 54 ML/MIN/1.73M2
GLUCOSE BLD-MCNC: 196 MG/DL (ref 70–99)
POTASSIUM BLD-SCNC: 3.3 MMOL/L (ref 3.4–5.3)
SODIUM SERPL-SCNC: 141 MMOL/L (ref 133–144)

## 2022-05-24 PROCEDURE — 250N000011 HC RX IP 250 OP 636: Performed by: HOSPITALIST

## 2022-05-24 PROCEDURE — 250N000013 HC RX MED GY IP 250 OP 250 PS 637: Performed by: HOSPITALIST

## 2022-05-24 PROCEDURE — 258N000003 HC RX IP 258 OP 636: Performed by: HOSPITALIST

## 2022-05-24 PROCEDURE — 120N000001 HC R&B MED SURG/OB

## 2022-05-24 PROCEDURE — 99232 SBSQ HOSP IP/OBS MODERATE 35: CPT | Performed by: HOSPITALIST

## 2022-05-24 PROCEDURE — 36415 COLL VENOUS BLD VENIPUNCTURE: CPT | Performed by: HOSPITALIST

## 2022-05-24 PROCEDURE — 82310 ASSAY OF CALCIUM: CPT | Performed by: HOSPITALIST

## 2022-05-24 RX ORDER — OXYCODONE HYDROCHLORIDE 5 MG/1
5 TABLET ORAL EVERY 6 HOURS PRN
Status: DISCONTINUED | OUTPATIENT
Start: 2022-05-24 | End: 2022-05-27 | Stop reason: HOSPADM

## 2022-05-24 RX ORDER — ACETAMINOPHEN 325 MG/1
975 TABLET ORAL EVERY 6 HOURS PRN
Status: DISPENSED | OUTPATIENT
Start: 2022-05-24 | End: 2022-05-26

## 2022-05-24 RX ORDER — SODIUM CHLORIDE 9 MG/ML
INJECTION, SOLUTION INTRAVENOUS CONTINUOUS
Status: DISCONTINUED | OUTPATIENT
Start: 2022-05-24 | End: 2022-05-25

## 2022-05-24 RX ADMIN — ACETAMINOPHEN 975 MG: 325 TABLET ORAL at 17:15

## 2022-05-24 RX ADMIN — FAMOTIDINE 20 MG: 20 TABLET ORAL at 08:35

## 2022-05-24 RX ADMIN — SODIUM CHLORIDE 500 ML: 9 INJECTION, SOLUTION INTRAVENOUS at 13:35

## 2022-05-24 RX ADMIN — ACYCLOVIR SODIUM 1000 MG: 50 INJECTION, SOLUTION INTRAVENOUS at 20:33

## 2022-05-24 RX ADMIN — FAMOTIDINE 20 MG: 20 TABLET ORAL at 20:33

## 2022-05-24 RX ADMIN — ACYCLOVIR SODIUM 1000 MG: 50 INJECTION, SOLUTION INTRAVENOUS at 12:10

## 2022-05-24 RX ADMIN — KETOROLAC TROMETHAMINE 15 MG: 15 INJECTION, SOLUTION INTRAMUSCULAR; INTRAVENOUS at 04:24

## 2022-05-24 RX ADMIN — SODIUM CHLORIDE: 9 INJECTION, SOLUTION INTRAVENOUS at 21:47

## 2022-05-24 RX ADMIN — ACETAMINOPHEN 975 MG: 325 TABLET ORAL at 01:26

## 2022-05-24 RX ADMIN — PAROXETINE HYDROCHLORIDE 30 MG: 30 TABLET, FILM COATED ORAL at 21:47

## 2022-05-24 RX ADMIN — SODIUM CHLORIDE: 9 INJECTION, SOLUTION INTRAVENOUS at 17:12

## 2022-05-24 RX ADMIN — ACYCLOVIR SODIUM 1000 MG: 50 INJECTION, SOLUTION INTRAVENOUS at 04:14

## 2022-05-24 ASSESSMENT — ACTIVITIES OF DAILY LIVING (ADL)
ADLS_ACUITY_SCORE: 18

## 2022-05-24 NOTE — PLAN OF CARE
Goal Outcome Evaluation:    Summary:  viral meningitis    DATE & TIME: 5/23/22 6842-0296  Cognitive Concerns/ Orientation : Alert and oriented x4- Sammarinese and English speaking   BEHAVIOR & AGGRESSION TOOL COLOR: GREEN  CIWA SCORE: NA   ABNL VS/O2: VSS on RA  MOBILITY: Ind  PAIN MANAGMENT: Scheduled tylenol prn Ketoralac for headache x1 with relief DIET: Regular- tolerating  BOWEL/BLADDER: WDL- continent  ABNL LAB/BG:   DRAIN/DEVICES: New PIV LFA SLTELEMETRY RHYTHM: NA  SKIN: Intact  TESTS/PROCEDURES: none this shift  D/C DAY/GOALS/PLACE: completion of antiviral treatment   OTHER IMPORTANT INFO: On IV acyclovir

## 2022-05-24 NOTE — PLAN OF CARE
Problem: Plan of Care - These are the overarching goals to be used throughout the patient stay.    Goal: Plan of Care Review/Shift Note  Description: The Plan of Care Review/Shift note should be completed every shift.  The Outcome Evaluation is a brief statement about your assessment that the patient is improving, declining, or no change.  This information will be displayed automatically on your shift note.  Outcome: Ongoing, Progressing     Problem: Infection  Goal: Absence of Infection Signs and Symptoms  Outcome: Ongoing, Progressing   Goal Outcome Evaluation:      IV bolus given due to high creatine, maintenance to be run afterwards. IV acyclovir given today as well. HA much improved-rating a 5/10 and denying pain medicine.

## 2022-05-24 NOTE — PROGRESS NOTES
[facility]  Medicine Progress Note - Hospitalist Service    Date of Admission:  5/21/2022    Assessment & Plan            Carlos A Connor is a 41 year old male with PMH significant for anxiety, panic attack, h/o COVID pneumonia (4/2021), substance abuse (methamphetamine, cocaine) who presented to ED with headache, LP was done and finding consistent with viral meningitis and was admitted for further management.     Headache, photophobia, likely VZV- meningitis   Temp 100.1 on admission, normal CBC; CSF  WBC reportedly elevated per ER(no result on lab, but has 93% lymphocytes on differential)  and elevated protein, no organisms on gram stain; CSF culture, meningitis/encephalitis panel sent and started on IV vancomycin Rocephin and acyclovir and admitted.  -CSF meningitis/encephalitis panel showed positive varicella-zoster virus.  -Evaluated by ID, IV antibiotics discontinued and continuing with IV acyclovir only. Plan is short course of antiviral IV while in hospital, and PO at discharge. Appreciate recommendation.   -Headache/visual symptoms improved.  Pain management-scheduled Tylenol, as needed Toradol and oxycodone. Discontinue IVF and monitor, encourage PO fluid intake, monitor daily Cr while on acyclovir  -CT head unremarkable   -Follow final cultures    Addendum  Acute kidney injury: suspect related to acyclovir and toradol  -Noted Cr increased to 1.65. K 3.3. will given  ml bolus and start on IV fluid, NS at 100 ml/hr.   -Follow BMP  -K mildly low, given ANABEL, will not replace today, monitor.   -discontinue toradol     Elevated blood pressure  No prior history of hypertension.  Suspect related to headache  - hydralazine IV PRN available for elevated blood pressure with parameters     Anxiety disorder  panic disorder  -resumed PTA Paxil and Ativan as needed     Substance abuse disorder  H/o methamphetamine and cocaine abuse  -urine toxicology positive for cocaine; denies any  "IV drug use  -counseled on drug cessation  -chem dep elke noted    Obesity: BMI 29      COVID-19 screening PCR negative on admission       Diet: Combination Diet Regular Diet Adult    DVT Prophylaxis: Pneumatic Compression Devices  Melara Catheter: Not present  Central Lines: None  Cardiac Monitoring: None  Code Status: Full Code      Disposition Plan   Expected Discharge: 05/27/2022  Ideally 10-14 days of IV antiviral. Given clinical improvement/also with h/o drug use, plan is to continue shorter course of IV acyclovir while in hospital per ID.      Anticipated discharge location:  Awaiting care coordination huddle  Delays:            The patient's care was discussed with the Bedside Nurse and Patient.    Alex Rosenbaum MD  Hospitalist Service  [facility]  Securely message with the Vocera Web Console (learn more here)  Text page via [employer] Paging/Directory       Clinically Significant Risk Factors Present on Admission             # Overweight: Estimated body mass index is 29.29 kg/m  as calculated from the following:    Height as of this encounter: 1.803 m (5' 11\").    Weight as of this encounter: 95.3 kg (210 lb).      ______________________________________________________________________    Interval History     Patient was evaluated this morning.  Patient states headache has significantly improved today, still 5/10.  Blurry vision resolved, feels vision is normal now.  No neck pain/stiffness.  No dizziness.  No nausea.  Blood pressure overall has improved, still with 140s but 137/85 this morning. Patient reports he is drinking plenty of fluid and ambulating in room.     Data reviewed today: I reviewed all medications, new labs and imaging results over the last 24 hours. I personally reviewed no images or EKG's today.    Physical Exam   Vital Signs: Temp: 98  F (36.7  C) Temp src: Oral BP: 137/85 Pulse: 65   Resp: 16 SpO2: 96 % O2 Device: None (Room air)    Weight: 210 lbs 0 " oz    General: AAOx3, very pleasant, appears comfortable.  HEENT: Conjunctival congestion improved, PERRLA EOMI. Mucosa moist.  No neck stiffness.  Lungs: Bilateral equal air entry. Clear to auscultation, normal work of breathing.   CVS: S1S2 regular, no tachycardia or murmur.   Abdomen: Soft, NT, ND. BS heard.  MSK: No edema or deformities.  Neuro: AAOX3. CN 2-12 normal. Strength symmetrical.  Skin: No rash.       Data   Recent Labs   Lab 05/23/22  0827 05/22/22  0756 05/21/22  0349 05/21/22  0233 05/20/22  0709   WBC 8.7 9.7 9.3  --  9.5   HGB 14.3 13.9 14.3  --  14.7   MCV 86 87 89  --  86    221 231  --  240    137 138  --  135   POTASSIUM 3.5 3.4 3.8  --  3.7   CHLORIDE 105 104 105  --  102   CO2 27 28 24  --  24   BUN 8 9 12  --  10   CR 0.65* 0.60* 0.84  --  0.57*   ANIONGAP 6 5 9  --  9   HORTENSIA 8.6 8.4* 8.3*  --  9.1   * 116* 211*   < > 138*   ALBUMIN 3.4  --   --   --  3.6   PROTTOTAL 7.0  --   --   --  7.5   BILITOTAL 1.0  --   --   --  1.5*   ALKPHOS 73  --   --   --  86   ALT 68  --   --   --  62   AST 32  --   --   --  20    < > = values in this interval not displayed.     No results found for this or any previous visit (from the past 24 hour(s)).  Medications       acyclovir (ZOVIRAX) IV  10 mg/kg Intravenous Q8H     famotidine  20 mg Oral BID     PARoxetine  30 mg Oral At Bedtime     senna-docusate  1 tablet Oral BID    Or     senna-docusate  2 tablet Oral BID     sodium chloride (PF)  3 mL Intracatheter Q8H

## 2022-05-24 NOTE — PLAN OF CARE
DATE & TIME: 5/23/22, 8605 - 4856   Cognitive Concerns/ Orientation : A&O x 4  BEHAVIOR & AGGRESSION TOOL COLOR: Green  ABNL VS/O2: BP elevated, Temp 99. Other VSS on room air  MOBILITY: Independenrt  PAIN MANAGMENT: Schedule Tylenol given along with prn Toradol for headache and lower back pain. Helpful   DIET: Regular  BOWEL/BLADDER: Continent  ABNL LAB/BG: AM lab pending  DRAIN/DEVICES: PIV SL  SKIN: Abrasion/scratch marks to right upper arm. Band aid to lower back at previous LP site  TESTS/PROCEDURES: NA  D/C DATE: Pending completion of antiviral treatment  OTHER IMPORTANT INFO: Droplet precautions maintained    Goal Outcome Evaluation:    Plan of Care Reviewed With: patient     Overall Patient Progress: improving

## 2022-05-24 NOTE — PROGRESS NOTES
"INFECTIOUS DISEASES PROGRESS NOTE    Assessment:  Patient with PMH polysubstance use admitted 5/20 with headache, malaise - found to have VZV meningitis. Had some initial visual blurring, but is now resolving and headache is slowly improving on acyclovir.     Recommendations:  -Continue acyclovir.   -HIV negative   - adequate hydration while on acyclovir       Patient Active Problem List   Diagnosis Code     Anxiety F41.9     Panic attack F41.0     CARDIOVASCULAR SCREENING; LDL GOAL LESS THAN 160 Z13.6     Overweight (BMI 25.0-29.9) E66.3     Health Care Home Z76.89     Migraine G43.909     Cocaine abuse (H) F14.10     Substance abuse (H) F19.10     Methamphetamine abuse (H) F15.10     TONNY (generalized anxiety disorder) F41.1     Meningitis due to herpes zoster virus B02.1       -------------------------------------------------------------------------------------------------------------------------------------------------------------------------  Subjective/Interval events:  -Afeb  Feeling a little better today, but headache still present. Is about an 8 today. No visual changes, no rashes.     Physical Exam:  /85 (BP Location: Right arm)   Pulse 65   Temp 98  F (36.7  C) (Oral)   Resp 16   Ht 1.803 m (5' 11\")   Wt 95.3 kg (210 lb)   SpO2 96%   BMI 29.29 kg/m       GENERAL:  nad  ENT:  Head is normocephalic, atraumatic.   EYES:  Eyes have anicteric sclerae.    NECK:  Supple.  SKIN:  No acute rashes.    NEUROLOGIC:  Conversational, CUETO    Laboratory Data:  Creatinine   Date Value Ref Range Status   05/23/2022 0.65 (L) 0.66 - 1.25 mg/dL Final   05/22/2022 0.60 (L) 0.66 - 1.25 mg/dL Final   05/21/2022 0.84 0.66 - 1.25 mg/dL Final   05/20/2022 0.57 (L) 0.66 - 1.25 mg/dL Final   08/30/2021 0.65 (L) 0.66 - 1.25 mg/dL Final   04/04/2021 0.63 (L) 0.66 - 1.25 mg/dL Final   04/03/2021 0.66 0.66 - 1.25 mg/dL Final   04/02/2021 0.59 (L) 0.66 - 1.25 mg/dL Final   04/01/2021 0.67 0.66 - 1.25 mg/dL Final   03/31/2021 " 0.77 0.66 - 1.25 mg/dL Final     WBC   Date Value Ref Range Status   04/04/2021 8.6 4.0 - 11.0 10e9/L Final   04/03/2021 7.2 4.0 - 11.0 10e9/L Final   04/02/2021 9.0 4.0 - 11.0 10e9/L Final   04/01/2021 6.4 4.0 - 11.0 10e9/L Final   03/31/2021 6.1 4.0 - 11.0 10e9/L Final     WBC Count   Date Value Ref Range Status   05/23/2022 8.7 4.0 - 11.0 10e3/uL Final   05/22/2022 9.7 4.0 - 11.0 10e3/uL Final   05/21/2022 9.3 4.0 - 11.0 10e3/uL Final   05/20/2022 9.5 4.0 - 11.0 10e3/uL Final   08/30/2021 9.1 4.0 - 11.0 10e3/uL Final     Hemoglobin   Date Value Ref Range Status   05/23/2022 14.3 13.3 - 17.7 g/dL Final   04/04/2021 13.5 13.3 - 17.7 g/dL Final     Platelet Count   Date Value Ref Range Status   05/23/2022 221 150 - 450 10e3/uL Final   04/04/2021 401 150 - 450 10e9/L Final     Lab Results   Component Value Date     05/23/2022    BUN 8 05/23/2022    CO2 27 05/23/2022     CRP Inflammation   Date Value Ref Range Status   04/04/2021 12.9 (H) 0.0 - 8.0 mg/L Final   04/03/2021 28.1 (H) 0.0 - 8.0 mg/L Final   04/02/2021 63.2 (H) 0.0 - 8.0 mg/L Final   04/01/2021 131.0 (H) 0.0 - 8.0 mg/L Final   03/31/2021 124.0 (H) 0.0 - 8.0 mg/L Final        Micro:  No results for input(s): CULT, SDES in the last 168 hours.    Imaging:  Recent Results (from the past 48 hour(s))   CT Head w/o Contrast    Narrative    CT SCAN OF THE HEAD WITHOUT CONTRAST   5/20/2022 7:20 AM     HISTORY: Headache.    TECHNIQUE:  Axial images of the head and coronal reformations without  IV contrast material. Radiation dose for this scan was reduced using  automated exposure control, adjustment of the mA and/or kV according  to patient size, or iterative reconstruction technique.    COMPARISON: Head CT 4/5/2022.    FINDINGS: There is no evidence of intracranial hemorrhage, mass, acute  infarct or anomaly. The ventricles are normal in size, shape and  configuration. The brain parenchyma and subarachnoid spaces are  normal.     The visualized portions of  the sinuses and mastoids appear normal. The  bony calvarium and bones of the skull base appear intact.       Impression    IMPRESSION:   No evidence of acute intracranial hemorrhage, mass, or  herniation.    CRICKET TRAORE MD         SYSTEM ID:  [id number]   CT Head w/o Contrast    Narrative    EXAM: CT HEAD W/O CONTRAST  LOCATION: [facility]  DATE/TIME: 5/21/2022 3:53 AM    INDICATION: Cerebral hemorrhage suspected, headache  COMPARISON: 04/05/2022  TECHNIQUE: Routine CT Head without IV contrast. Multiplanar reformats. Dose reduction techniques were used.    FINDINGS:  INTRACRANIAL CONTENTS: No intracranial hemorrhage, extraaxial collection, or mass effect.  No CT evidence of acute infarct. Normal parenchymal attenuation. Normal ventricles and sulci.     VISUALIZED ORBITS/SINUSES/MASTOIDS: No intraorbital abnormality. No paranasal sinus mucosal disease. No middle ear or mastoid effusion.    BONES/SOFT TISSUES: No acute abnormality.      Impression    IMPRESSION:  1.  No acute intracranial abnormality or significant change compared to the prior study.

## 2022-05-25 LAB
ALBUMIN UR-MCNC: NEGATIVE MG/DL
ANION GAP SERPL CALCULATED.3IONS-SCNC: 6 MMOL/L (ref 3–14)
APPEARANCE UR: CLEAR
BILIRUB UR QL STRIP: NEGATIVE
BUN SERPL-MCNC: 16 MG/DL (ref 7–30)
CALCIUM SERPL-MCNC: 8.7 MG/DL (ref 8.5–10.1)
CHLORIDE BLD-SCNC: 109 MMOL/L (ref 94–109)
CO2 SERPL-SCNC: 25 MMOL/L (ref 20–32)
COLOR UR AUTO: ABNORMAL
CREAT SERPL-MCNC: 1.47 MG/DL (ref 0.66–1.25)
CREAT UR-MCNC: 35 MG/DL
FRACT EXCRET NA UR+SERPL-RTO: 2.3 %
GFR SERPL CREATININE-BSD FRML MDRD: 61 ML/MIN/1.73M2
GLUCOSE BLD-MCNC: 152 MG/DL (ref 70–99)
GLUCOSE BLDC GLUCOMTR-MCNC: 125 MG/DL (ref 70–99)
GLUCOSE BLDC GLUCOMTR-MCNC: 156 MG/DL (ref 70–99)
GLUCOSE BLDC GLUCOMTR-MCNC: 162 MG/DL (ref 70–99)
GLUCOSE UR STRIP-MCNC: 50 MG/DL
HBA1C MFR BLD: 6.2 % (ref 0–5.6)
HGB UR QL STRIP: NEGATIVE
KETONES UR STRIP-MCNC: NEGATIVE MG/DL
LEUKOCYTE ESTERASE UR QL STRIP: NEGATIVE
NITRATE UR QL: NEGATIVE
PH UR STRIP: 6 [PH] (ref 5–7)
POTASSIUM BLD-SCNC: 3.5 MMOL/L (ref 3.4–5.3)
SODIUM SERPL-SCNC: 140 MMOL/L (ref 133–144)
SODIUM UR-SCNC: 76 MMOL/L
SP GR UR STRIP: 1.01 (ref 1–1.03)
UROBILINOGEN UR STRIP-MCNC: NORMAL MG/DL

## 2022-05-25 PROCEDURE — 250N000013 HC RX MED GY IP 250 OP 250 PS 637: Performed by: HOSPITALIST

## 2022-05-25 PROCEDURE — 83036 HEMOGLOBIN GLYCOSYLATED A1C: CPT | Performed by: INTERNAL MEDICINE

## 2022-05-25 PROCEDURE — 250N000011 HC RX IP 250 OP 636: Performed by: HOSPITALIST

## 2022-05-25 PROCEDURE — 258N000003 HC RX IP 258 OP 636: Performed by: HOSPITALIST

## 2022-05-25 PROCEDURE — 84300 ASSAY OF URINE SODIUM: CPT | Performed by: INTERNAL MEDICINE

## 2022-05-25 PROCEDURE — 250N000011 HC RX IP 250 OP 636: Performed by: INTERNAL MEDICINE

## 2022-05-25 PROCEDURE — 99233 SBSQ HOSP IP/OBS HIGH 50: CPT | Performed by: INTERNAL MEDICINE

## 2022-05-25 PROCEDURE — 81003 URINALYSIS AUTO W/O SCOPE: CPT | Performed by: INTERNAL MEDICINE

## 2022-05-25 PROCEDURE — 36415 COLL VENOUS BLD VENIPUNCTURE: CPT | Performed by: INTERNAL MEDICINE

## 2022-05-25 PROCEDURE — 80048 BASIC METABOLIC PNL TOTAL CA: CPT | Performed by: INTERNAL MEDICINE

## 2022-05-25 PROCEDURE — 258N000003 HC RX IP 258 OP 636: Performed by: INTERNAL MEDICINE

## 2022-05-25 PROCEDURE — 120N000001 HC R&B MED SURG/OB

## 2022-05-25 RX ORDER — SODIUM CHLORIDE 9 MG/ML
INJECTION, SOLUTION INTRAVENOUS CONTINUOUS
Status: DISCONTINUED | OUTPATIENT
Start: 2022-05-25 | End: 2022-05-27 | Stop reason: HOSPADM

## 2022-05-25 RX ORDER — SODIUM CHLORIDE AND POTASSIUM CHLORIDE 150; 900 MG/100ML; MG/100ML
INJECTION, SOLUTION INTRAVENOUS CONTINUOUS
Status: DISCONTINUED | OUTPATIENT
Start: 2022-05-25 | End: 2022-05-25

## 2022-05-25 RX ORDER — NICOTINE POLACRILEX 4 MG
15-30 LOZENGE BUCCAL
Status: DISCONTINUED | OUTPATIENT
Start: 2022-05-25 | End: 2022-05-27 | Stop reason: HOSPADM

## 2022-05-25 RX ORDER — DEXTROSE MONOHYDRATE 25 G/50ML
25-50 INJECTION, SOLUTION INTRAVENOUS
Status: DISCONTINUED | OUTPATIENT
Start: 2022-05-25 | End: 2022-05-27 | Stop reason: HOSPADM

## 2022-05-25 RX ORDER — POTASSIUM CHLORIDE 1500 MG/1
20 TABLET, EXTENDED RELEASE ORAL ONCE
Status: COMPLETED | OUTPATIENT
Start: 2022-05-25 | End: 2022-05-25

## 2022-05-25 RX ORDER — POTASSIUM CHLORIDE 750 MG/1
20 CAPSULE, EXTENDED RELEASE ORAL ONCE
Status: DISCONTINUED | OUTPATIENT
Start: 2022-05-25 | End: 2022-05-25

## 2022-05-25 RX ADMIN — SODIUM CHLORIDE: 9 INJECTION, SOLUTION INTRAVENOUS at 09:59

## 2022-05-25 RX ADMIN — PAROXETINE HYDROCHLORIDE 30 MG: 30 TABLET, FILM COATED ORAL at 22:13

## 2022-05-25 RX ADMIN — FAMOTIDINE 20 MG: 20 TABLET ORAL at 08:40

## 2022-05-25 RX ADMIN — HYALURONIDASE (HUMAN RECOMBINANT) 150 UNITS: 150 INJECTION, SOLUTION SUBCUTANEOUS at 05:00

## 2022-05-25 RX ADMIN — SODIUM CHLORIDE, POTASSIUM CHLORIDE, SODIUM LACTATE AND CALCIUM CHLORIDE 500 ML: 600; 310; 30; 20 INJECTION, SOLUTION INTRAVENOUS at 08:36

## 2022-05-25 RX ADMIN — ACYCLOVIR SODIUM 1000 MG: 50 INJECTION, SOLUTION INTRAVENOUS at 23:52

## 2022-05-25 RX ADMIN — ACYCLOVIR SODIUM 1000 MG: 50 INJECTION, SOLUTION INTRAVENOUS at 03:50

## 2022-05-25 RX ADMIN — POTASSIUM CHLORIDE 20 MEQ: 1500 TABLET, EXTENDED RELEASE ORAL at 23:52

## 2022-05-25 RX ADMIN — ACYCLOVIR SODIUM 1000 MG: 50 INJECTION, SOLUTION INTRAVENOUS at 17:44

## 2022-05-25 ASSESSMENT — ACTIVITIES OF DAILY LIVING (ADL)
ADLS_ACUITY_SCORE: 18

## 2022-05-25 NOTE — PROGRESS NOTES
[facility]    Hospitalist Progress Note    Date of Service (when I saw the patient): 05/25/2022  Admit date: 5/21/2022    Interval History   Full details of events over last 24 hours outlined below.   Cr jump today 0.65 > 1.65, yesterday. Note that IVF hydration ordered on acyclovir but IV infiltrated last night and she was without IVF for a while.   VSS, no hypotension, afebrile. Normal oxygenation on RA.   Bolus ordered this AM.   Denies any SOB, CP, abdominal pain, N/V/D.   HA much better. Eating well.     Assessment & Plan   Carlos A Connor is a 41 year old male with PMH significant for anxiety, panic attack, h/o COVID pneumonia (4/2021), substance abuse (methamphetamine, cocaine) who presented to ED with headache, LP was done and finding consistent with viral meningitis and was admitted for further management.     Headache, photophobia, likely VZV- meningitis   Temp 100.1 on admission, normal CBC; CSF  WBC reportedly elevated per ER(no result on lab, but has 93% lymphocytes on differential)  and elevated protein, no organisms on gram stain; CSF culture, meningitis/encephalitis panel sent and started on IV vancomycin Rocephin and acyclovir and admitted.  * CSF meningitis/encephalitis panel showed positive varicella-zoster virus.  * Head CT normal      Evaluated by ID, IV antibiotics discontinued and continuing with IV acyclovir only. Plan is short course of antiviral IV while in hospital, and PO at discharge. Appreciate recommendation.     Patient ha developed ANABEL on IV aclovir.     I have sent a message to ID re: potential change to PO given above.    On 05/25/22 Headache almost completely resolved. Vision normal.      Acute kidney injury: suspect related to acyclovir and toradol. ? Crystal-induced tubular injury.     Patient developed Cr 0.65 > 1.65     Toradol stopped    Given bolus and started on IVF    Continues on IVF but lost IV last night. > give additional bolus today.      Recheck BMP     Check UA, FENa.     Patient states UOP is great    If Cr not improving, will start lasix with further boluses in attempt to flush crystals and have consulted with ID re: changing to PO acyclovir.     Also      Elevated blood pressure    No prior history of hypertension.  Suspect related to headache    No indication for urgent treatment as inpatient unless severe (>180/110) or symptomatic. Consider addition of oral medications if remains significantly elevated.     Elevated BS, history of pre-diabetes  Hemoglobin A1C POCT   Date Value Ref Range Status   04/01/2021 5.8 (H) 0 - 5.6 % Final     Comment:     Normal <5.7% Prediabetes 5.7-6.4%  Diabetes 6.5% or higher - adopted from ADA   consensus guidelines.       Recent Labs   Lab 05/24/22  1139 05/23/22  0827 05/22/22  0756 05/21/22  0349 05/21/22  0233 05/20/22  0709   * 124* 116* 211* 199* 138*          Recheck A1c    Follow QID BS with correction dose insulin     Diabetic diet     Nutrition consult.       Anxiety disorder  panic disorder    Continue PTA Paxil and Ativan as needed     Substance abuse disorder  H/o methamphetamine and cocaine abuse  * Urine toxicology positive for cocaine; denies any IV drug use    counseled on drug cessation    chem dep eval appreciated > provided resources on 5/23.     GI PPX - stopped famotidine on 05/25/22. No indication for GI PPX      Obesity: BMI 29  Diet: Orders Placed This Encounter      Combination Diet Regular Diet Adult     IVF: Increased NS to 125 ml/h  Melara Catheter: Not present     DVT Prophylaxis: Low Risk/Ambulatory with no VTE prophylaxis indicated and Pneumatic Compression Devices  Code Status: Full Code     Disposition: Expected discharge 1 to 2 days, once Cr improving and ultimate acyclovir treatment determined.   Communication: Discussed with patient directly in Nigerian, RN on 05/25/22. Message sent out to ID doctor.     Kiarra Guerra MD  Hospitalist Service  [facility]  [facility]  Securely message with the Vocera Web Console (learn more here)  Text page via [employer] Paging/Directory    Total time spent:  > 35 minutes  Time spent in personal review and interpretation of labs and studies as noted in order to assume care, counseling, coordination of care: 25 minutes including discussion with care team and patient,       -Data reviewed today: I reviewed all new labs and imaging results over the last 24 hours. I personally reviewed no images or EKG's today.    Physical Exam   Temp: 98.2  F (36.8  C) Temp src: Oral BP: 139/89 Pulse: 71   Resp: 17 SpO2: 96 % O2 Device: None (Room air)    Vitals:    05/21/22 0224   Weight: 95.3 kg (210 lb)     Vital Signs with Ranges  Temp:  [98  F (36.7  C)-98.5  F (36.9  C)] 98.2  F (36.8  C)  Pulse:  [65-73] 71  Resp:  [16-17] 17  BP: (137-146)/(85-95) 139/89  SpO2:  [96 %-97 %] 96 %  I/O last 3 completed shifts:  In: 1000 [P.O.:1000]  Out: -     Today's Exam  Constitutional:  NAD,   Neuropsyche:  alert and oriented, answers questions appropriately.   Respiratory:  Breathing comfortably, good air exchange, no wheezes, no crackles.   Cardiovascular:  Regular rate and rhythm, no edema.  GI:  soft, NT/ND, BS normal  Skin/Integumen:  No acute rash or sign of bleeding.     Medications   All medications reviewed on 05/25/22        sodium chloride 100 mL/hr at 05/24/22 8407       acyclovir (ZOVIRAX) IV  10 mg/kg Intravenous Q8H     famotidine  20 mg Oral BID     insulin aspart  1-7 Units Subcutaneous TID AC     insulin aspart  1-5 Units Subcutaneous At Bedtime     lactated ringers  500 mL Intravenous Once     PARoxetine  30 mg Oral At Bedtime     senna-docusate  1 tablet Oral BID    Or     senna-docusate  2 tablet Oral BID     sodium chloride (PF)  3 mL Intracatheter Q8H       Data   Recent Labs   Lab 05/24/22  1139 05/23/22  0827 05/22/22  0756 05/21/22  0349 05/21/22  0233 05/20/22  0709   WBC  --  8.7 9.7 9.3  --  9.5   HGB  --  14.3 13.9 14.3  --   14.7   MCV  --  86 87 89  --  86   PLT  --  221 221 231  --  240    138 137 138  --  135   POTASSIUM 3.3* 3.5 3.4 3.8  --  3.7   CHLORIDE 108 105 104 105  --  102   CO2 26 27 28 24  --  24   BUN 20 8 9 12  --  10   CR 1.65* 0.65* 0.60* 0.84  --  0.57*   ANIONGAP 7 6 5 9  --  9   HORTENSIA 8.7 8.6 8.4* 8.3*  --  9.1   * 124* 116* 211*   < > 138*   ALBUMIN  --  3.4  --   --   --  3.6   PROTTOTAL  --  7.0  --   --   --  7.5   BILITOTAL  --  1.0  --   --   --  1.5*   ALKPHOS  --  73  --   --   --  86   ALT  --  68  --   --   --  62   AST  --  32  --   --   --  20    < > = values in this interval not displayed.       No results found for this or any previous visit (from the past 24 hour(s)).

## 2022-05-25 NOTE — PLAN OF CARE
DATE & TIME: 5/24/22, 2556-4144      Cognitive Concerns/ Orientation : A&O x 4  BEHAVIOR & AGGRESSION TOOL COLOR: Green  ABNL VS/O2: VSS on RA  MOBILITY: Independent  PAIN MANAGMENT: Schedule Tylenol x1, pt c/o 5/10 headache, said he may want the oxy at HS so he can sleep.  DIET: Regular  BOWEL/BLADDER: Continent- pt reported he had a BM today  ABNL LAB/BG: K+ 3.3, not on replacement protocol.   DRAIN/DEVICES: PIV infusing NS 100mL/hr  SKIN: Abrasion/scratch marks to right upper arm. Band aid to lower back at previous LP site  TESTS/PROCEDURES: NA  D/C DATE: Pending completion of antiviral treatment  OTHER IMPORTANT INFO: Droplet precautions maintained, IV bolus given at the end of last shift due to high creatine, maintenance to be run afterwards.

## 2022-05-25 NOTE — PLAN OF CARE
Goal Outcome Evaluation:      Summary:  Viral meningitis    DATE & TIME: 5/24/22-5/25/22 3668-1705   Cognitive Concerns/ Orientation : A&O x 4  BEHAVIOR & AGGRESSION TOOL COLOR: Green  ABNL VS/O2: VSS on RA  MOBILITY: Independent  PAIN MANAGMENT: 2/10 pain-denied any pain medication   BOWEL/BLADDER: Continent  ABNL LAB/BG: K+ 3.3, not on replacement protocol.   DRAIN/DEVICES: PIV infusing NS 100mL/hr- IV became infiltrated, gave hyaluronidase around site and monitored for extravasation with the protocol.New IV placed in Right forearm.   SKIN: Abrasion/scratch marks to right upper arm. Band aid to lower back at previous LP site  TESTS/PROCEDURES: NA  D/C DATE: Pending completion of antiviral treatment  OTHER IMPORTANT INFO: Droplet precautions maintained. Monitor extravasation site on left arm per protocol.

## 2022-05-25 NOTE — PLAN OF CARE
Summary:  Viral meningitis    DATE & TIME: 5/25/22 4828-9963  Cognitive Concerns/ Orientation : A&O x 4  BEHAVIOR & AGGRESSION TOOL COLOR: Green  ABNL VS/O2: VSS on RA ex /92- MD note says to just watch BP unless it gets above 180 systolic or 110 diastolic  MOBILITY: Independent  PAIN MANAGMENT: 2/10 pain-denied any pain medication   BOWEL/BLADDER: Continent  ABNL LAB/BG: Creat 1.47, A1C 6.2,  (not covered with insulin, pt had just eaten. Reminded to call us before eating to check BG)  DRAIN/DEVICES: PIV infusing NS 100mL/hr. L arm had extravasated IV earlier, no swelling, redness or pain noted.  SKIN: Abrasion/scratch marks to right upper arm. Band aid to lower back at previous LP site  TESTS/PROCEDURES: NA  D/C DATE: Pending completion of antiviral treatment  OTHER IMPORTANT INFO: Droplet precautions maintained. Monitor extravasation site on left arm per protocol.

## 2022-05-25 NOTE — CONSULTS
"NUTRITION EDUCATION      REASON FOR ASSESSMENT:  Provider Order - \"Low Carb Diet\"     NUTRITION HISTORY:  Information obtained from patient.   - He typically eats meals BID. He eats mostly authentic homemade foods such as tortillas, rice, beans, chicken, fish.   - Notes that he was previously diagnosed as \"pre-diabetic\" and really cut back on carbs at that time. It helped a lot, but eventually he got tired of the restrictions and started eating more.   - Admitted that he was also drinking beer 3-4x weekly.   - Has a brother with diabetes who has it really well controlled.     CURRENT DIET:  Regular diet     NUTRITION DIAGNOSIS:  Food- and nutrition-related knowledge deficit R/t carbohydrates and blood sugar management AEB patient states he could use review.     INTERVENTIONS:    Nutrition Prescription:  Prescribed a CONSISTENT CARBOHYDRATE diet with emphasis on protein and complex carbs.     Implementation:      *  Nutrition Education (Content):   A)  Provided handout     Carbohydrate counting   B)  Discussed     Foods w/ carb    Foods w/ little carb    Balancing meals    Eating 3 balanced meals/day    Importance of protein and fiber      *  Nutrition Education (Application):   A)  Discussed current eating habits and recommended alternative food choices    Limit alcohol    Eat meals TID    Limit use of tortillas. Eat more beans than rice       *  Anticipate good compliance - expressed motivation.       *  Diet Education - refer to Education Flowsheet    Goals:      *  Patient will verbalize understanding of diet      *  All of the above goals met during the education session    Follow Up/Monitoring:      *  Provided RD contact information for future questions      *  Recommended Out-Patient Nutrition Referral, if further diet instructions are needed    Kailyn Smith RD, LD  [facility]  Pager: [phone]  Weekend Pager: [phone]    "

## 2022-05-26 ENCOUNTER — APPOINTMENT (OUTPATIENT)
Dept: INTERPRETER SERVICES | Facility: CLINIC | Age: 42
End: 2022-05-26
Payer: MEDICAID

## 2022-05-26 LAB
ANION GAP SERPL CALCULATED.3IONS-SCNC: 4 MMOL/L (ref 3–14)
BACTERIA BLD CULT: NO GROWTH
BACTERIA BLD CULT: NO GROWTH
BACTERIA CSF CULT: NO GROWTH
BUN SERPL-MCNC: 14 MG/DL (ref 7–30)
CALCIUM SERPL-MCNC: 9.1 MG/DL (ref 8.5–10.1)
CHLORIDE BLD-SCNC: 111 MMOL/L (ref 94–109)
CO2 SERPL-SCNC: 27 MMOL/L (ref 20–32)
CREAT SERPL-MCNC: 1.21 MG/DL (ref 0.66–1.25)
GFR SERPL CREATININE-BSD FRML MDRD: 78 ML/MIN/1.73M2
GLUCOSE BLD-MCNC: 141 MG/DL (ref 70–99)
GLUCOSE BLDC GLUCOMTR-MCNC: 135 MG/DL (ref 70–99)
GLUCOSE BLDC GLUCOMTR-MCNC: 137 MG/DL (ref 70–99)
GLUCOSE BLDC GLUCOMTR-MCNC: 153 MG/DL (ref 70–99)
GLUCOSE BLDC GLUCOMTR-MCNC: 166 MG/DL (ref 70–99)
GLUCOSE BLDC GLUCOMTR-MCNC: 92 MG/DL (ref 70–99)
GRAM STAIN RESULT: NORMAL
GRAM STAIN RESULT: NORMAL
POTASSIUM BLD-SCNC: 4.2 MMOL/L (ref 3.4–5.3)
SODIUM SERPL-SCNC: 142 MMOL/L (ref 133–144)

## 2022-05-26 PROCEDURE — 82310 ASSAY OF CALCIUM: CPT | Performed by: INTERNAL MEDICINE

## 2022-05-26 PROCEDURE — 250N000013 HC RX MED GY IP 250 OP 250 PS 637: Performed by: HOSPITALIST

## 2022-05-26 PROCEDURE — 36415 COLL VENOUS BLD VENIPUNCTURE: CPT | Performed by: INTERNAL MEDICINE

## 2022-05-26 PROCEDURE — 120N000001 HC R&B MED SURG/OB

## 2022-05-26 PROCEDURE — 250N000011 HC RX IP 250 OP 636: Performed by: HOSPITALIST

## 2022-05-26 PROCEDURE — 258N000003 HC RX IP 258 OP 636: Performed by: INTERNAL MEDICINE

## 2022-05-26 PROCEDURE — 99232 SBSQ HOSP IP/OBS MODERATE 35: CPT | Performed by: INTERNAL MEDICINE

## 2022-05-26 PROCEDURE — 258N000003 HC RX IP 258 OP 636: Performed by: HOSPITALIST

## 2022-05-26 RX ADMIN — ACYCLOVIR SODIUM 1000 MG: 50 INJECTION, SOLUTION INTRAVENOUS at 16:59

## 2022-05-26 RX ADMIN — SODIUM CHLORIDE: 9 INJECTION, SOLUTION INTRAVENOUS at 17:00

## 2022-05-26 RX ADMIN — ACETAMINOPHEN 975 MG: 325 TABLET ORAL at 11:16

## 2022-05-26 RX ADMIN — PAROXETINE HYDROCHLORIDE 30 MG: 30 TABLET, FILM COATED ORAL at 21:07

## 2022-05-26 RX ADMIN — ACYCLOVIR SODIUM 1000 MG: 50 INJECTION, SOLUTION INTRAVENOUS at 08:21

## 2022-05-26 RX ADMIN — ACYCLOVIR SODIUM 1000 MG: 50 INJECTION, SOLUTION INTRAVENOUS at 23:50

## 2022-05-26 ASSESSMENT — ACTIVITIES OF DAILY LIVING (ADL)
ADLS_ACUITY_SCORE: 18

## 2022-05-26 NOTE — PROGRESS NOTES
Date/Time: 2022-1930  Summary: Meningitis due to herpes zoster virus  Hx: SHADE, ANABEL  Precautions: droplet  Cognitive Concerns/Orientation: A&Ox4  Behavior and Aggression Color: green. Pleasant.  ABNL VS/O2: VSS on RA ex HTN  Mobility: independent  Pain Management: complaints of headache managed with prn tylenol given x1  Diet: carb count. Pt's family will bring him food.   Bowel/Bladder: continent B&B. Ambulating to BR. Strict I&O.    ABNL Labs/BG: B, 92, 166  Drain/Devices: PIV infusing NS at 125mL/hr with intermittent ABX  Telemetry Rhythm: NA  Skin: no concerns. Scattered bruising.   Tests/Procedures:   Discharge Date: 1-2 days pending improvement of Cr and acyclovir treatment decided.   Other Info:   -Pt is Armenian speaking but very good with english.   -Pt is newly diagnosed with diabetes and will forget to call before eating a meal to get a blood sugar. Family will bring him food as well so be aware.  -Patient had a shower today.  -On  IV site on left forearm extravasated with Acyclovir. Site looks good.

## 2022-05-26 NOTE — PLAN OF CARE
Continue IVF. If UOP > 75 ml / h no further intervention.   Follow strict UOP.   If UOP < 250 ml per 3 hours, give 20 mg IV lasix with 250 ml NS bolus.

## 2022-05-26 NOTE — PROGRESS NOTES
[facility]    Hospitalist Progress Note    Date of Service (when I saw the patient): 05/25/2022  Admit date: 5/21/2022    Interval History   Full details of events over last 24 hours outlined below.   Cr jump today 0.65 > 1.65, yesterday. Note that IVF hydration ordered on acyclovir but IV infiltrated last night and she was without IVF for a while.   VSS, no hypotension, afebrile. Normal oxygenation on RA.   Bolus ordered this AM.   Denies any SOB, CP, abdominal pain, N/V/D.   HA much better. Eating well.     Assessment & Plan   Carlos A Connor is a 41 year old male with PMH significant for anxiety, panic attack, h/o COVID pneumonia (4/2021), substance abuse (methamphetamine, cocaine) who presented to ED with headache, LP was done and finding consistent with viral meningitis and was admitted for further management.     Headache, photophobia, likely VZV- meningitis   Temp 100.1 on admission, normal CBC; CSF  WBC reportedly elevated per ER(no result on lab, but has 93% lymphocytes on differential)  and elevated protein, no organisms on gram stain; CSF culture, meningitis/encephalitis panel sent and started on IV vancomycin Rocephin and acyclovir and admitted.  * CSF meningitis/encephalitis panel showed positive varicella-zoster virus.  * Head CT normal      Evaluated by ID, IV antibiotics discontinued and continuing with IV acyclovir only. Plan is short course of antiviral IV while in hospital, and PO at discharge. Appreciate recommendation.     Patient ha developed ANABEL on IV aclovir.     I have sent a message to ID re: potential change to PO given above.    On 05/25/22 Headache almost completely resolved. Vision normal.      Discussed with ID, recommends 7 full days of IV acyclovir, through 5/27.     Acute kidney injury: suspect related to acyclovir and toradol. ? Crystal-induced tubular injury.     Patient developed Cr 0.65 > 1.65     Toradol stopped    Given bolus and started on  IVF    Continues on IVF but lost IV last night. > give additional bolus today.     Recheck BMP     Check UA, FENa.     Patient states UOP is great    If Cr not improving, will start lasix with further boluses in attempt to flush crystals and have consulted with ID re: changing to PO acyclovir.     Strict I and O, discussed with patient    Addendum: Discussed with nephrology. As long as good UOP, hold on lasix. Start lasix + bolus if UOP consistently < 75 ml/h.     Recent Labs   Lab 05/25/22  0829 05/24/22  1139 05/23/22  0827 05/22/22  0756 05/21/22  0349 05/20/22  0709   CR 1.47* 1.65* 0.65* 0.60* 0.84 0.57*     Mild Hypokalemia    K normal but on IVF with NS > give 20 meq KCl now.     Ordered replacement protocol.    Recent Labs   Lab 05/25/22  0829 05/24/22  1139 05/23/22  0827 05/22/22  0756 05/21/22  0349 05/20/22  0709   POTASSIUM 3.5 3.3* 3.5 3.4 3.8 3.7         Elevated blood pressure    No prior history of hypertension.  Suspect related to headache    No indication for urgent treatment as inpatient unless severe (>180/110) or symptomatic. Consider addition of oral medications if remains significantly elevated.     Elevated BS, history of pre-diabetes    Hemoglobin A1C   Date Value Ref Range Status   05/25/2022 6.2 (H) 0.0 - 5.6 % Final     Comment:     Normal <5.7%   Prediabetes 5.7-6.4%    Diabetes 6.5% or higher     Note: Adopted from ADA consensus guidelines.     Recent Labs   Lab 05/25/22  1732 05/25/22  1154 05/25/22  0829 05/24/22  1139 05/23/22  0827 05/22/22  0756   * 125* 152* 196* 124* 116*          Follow QID BS with correction dose insulin     Diabetic diet     Nutrition consult.       Anxiety disorder  panic disorder    Continue PTA Paxil and Ativan as needed     Substance abuse disorder  H/o methamphetamine and cocaine abuse  * Urine toxicology positive for cocaine; denies any IV drug use    counseled on drug cessation    chem dep elke appreciated > provided resources on 5/23.     GI  PPX - stopped famotidine on 05/25/22. No indication for GI PPX      Obesity: BMI 29  Diet: Orders Placed This Encounter      High Consistent Carb (75 g CHO per Meal) Diet     IVF: Increased NS to 125 ml/h  Melara Catheter: Not present     DVT Prophylaxis: Low Risk/Ambulatory with no VTE prophylaxis indicated and Pneumatic Compression Devices  Code Status: Full Code     Disposition: Expected discharge 1 to 2 days, once Cr improving and ultimate acyclovir treatment determined.   Communication: Discussed with patient directly in Romanian, RN on 05/25/22. Message sent out to ID doctor.     Kiarra Guerra MD  Hospitalist Service  [facility]  Securely message with the Vocera Web Console (learn more here)  Text page via [employer] Paging/Directory    Total time spent:  > 35 minutes  Time spent in personal review and interpretation of labs and studies as noted in order to assume care, counseling, coordination of care: 25 minutes including discussion with care team and patient,       -Data reviewed today: I reviewed all new labs and imaging results over the last 24 hours. I personally reviewed no images or EKG's today.    Physical Exam   Temp: 98  F (36.7  C) Temp src: Oral BP: (!) 167/94 Pulse: 73   Resp: 18 SpO2: 98 % O2 Device: None (Room air)    Vitals:    05/21/22 0224   Weight: 95.3 kg (210 lb)     Vital Signs with Ranges  Temp:  [98  F (36.7  C)-98.4  F (36.9  C)] 98  F (36.7  C)  Pulse:  [66-73] 73  Resp:  [17-18] 18  BP: (139-167)/(89-94) 167/94  SpO2:  [96 %-98 %] 98 %  I/O last 3 completed shifts:  In: 2357.08 [P.O.:1480; I.V.:877.08]  Out: 950 [Urine:950]    Today's Exam  Constitutional:  NAD,   Neuropsyche:  alert and oriented, answers questions appropriately.   Respiratory:  Breathing comfortably, good air exchange, no wheezes, no crackles.   Cardiovascular:  Regular rate and rhythm, no edema.  GI:  soft, NT/ND, BS normal  Skin/Integumen:  No acute rash or sign of bleeding.     Medications    All medications reviewed on 05/25/22        sodium chloride 125 mL/hr at 05/25/22 0959       acyclovir (ZOVIRAX) IV  10 mg/kg Intravenous Q8H     insulin aspart  1-7 Units Subcutaneous TID AC     insulin aspart  1-5 Units Subcutaneous At Bedtime     PARoxetine  30 mg Oral At Bedtime     potassium chloride ER  20 mEq Oral Once     senna-docusate  1 tablet Oral BID    Or     senna-docusate  2 tablet Oral BID     sodium chloride (PF)  3 mL Intracatheter Q8H       Data   Recent Labs   Lab 05/25/22  1732 05/25/22  1154 05/25/22  0829 05/24/22  1139 05/23/22  0827 05/22/22  0756 05/21/22  0349 05/21/22  0233 05/20/22  0709   WBC  --   --   --   --  8.7 9.7 9.3  --  9.5   HGB  --   --   --   --  14.3 13.9 14.3  --  14.7   MCV  --   --   --   --  86 87 89  --  86   PLT  --   --   --   --  221 221 231  --  240   NA  --   --  140 141 138 137 138  --  135   POTASSIUM  --   --  3.5 3.3* 3.5 3.4 3.8  --  3.7   CHLORIDE  --   --  109 108 105 104 105  --  102   CO2  --   --  25 26 27 28 24  --  24   BUN  --   --  16 20 8 9 12  --  10   CR  --   --  1.47* 1.65* 0.65* 0.60* 0.84  --  0.57*   ANIONGAP  --   --  6 7 6 5 9  --  9   HORTENSIA  --   --  8.7 8.7 8.6 8.4* 8.3*  --  9.1   * 125* 152* 196* 124* 116* 211*   < > 138*   ALBUMIN  --   --   --   --  3.4  --   --   --  3.6   PROTTOTAL  --   --   --   --  7.0  --   --   --  7.5   BILITOTAL  --   --   --   --  1.0  --   --   --  1.5*   ALKPHOS  --   --   --   --  73  --   --   --  86   ALT  --   --   --   --  68  --   --   --  62   AST  --   --   --   --  32  --   --   --  20    < > = values in this interval not displayed.       No results found for this or any previous visit (from the past 24 hour(s)).

## 2022-05-26 NOTE — PLAN OF CARE
Goal Outcome Evaluation:      Summary:  Viral meningitis    DATE & TIME: 5/25/22- 5/26/22 [id number]  Cognitive Concerns/ Orientation : A&O x 4  BEHAVIOR & AGGRESSION TOOL COLOR: Green  ABNL VS/O2: VSS on RA- HTN noted 5/25 MD note says to just watch BP unless it gets above 180 systolic or 110 diastolic  MOBILITY: Independent  PAIN MANAGMENT: 1/10 pain for very minor headache-denied any pain medication   BOWEL/BLADDER: Continent  ABNL LAB/BG: Creat 1.47, A1C 6.2, , 137.   DRAIN/DEVICES: RPIV infusing  mL/hr. L arm had extravasated IV earlier 5/25, no swelling, redness or pain noted, very minimal pink remaining 5/26 AM.  SKIN: Abrasion/scratch marks to right upper arm. Band aid to lower back at previous LP site  D/C DATE: Pending completion of antiviral treatment.  OTHER IMPORTANT INFO: Droplet precautions maintained. Monitor extravasation site on left arm per protocol.

## 2022-05-26 NOTE — PROGRESS NOTES
[facility]    Hospitalist Progress Note    Date of Service (when I saw the patient): 05/26/2022  Admit date: 5/21/2022    Interval History   Patient resting comfortably, creatinine is improved today, mentions some headache but much better than when he was admitted.  Assessment & Plan   Carlos A Connor is a 41 year old male with PMH significant for anxiety, panic attack, h/o COVID pneumonia (4/2021), substance abuse (methamphetamine, cocaine) who presented to ED with headache, LP was done and finding consistent with viral meningitis and was admitted for further management.     Headache, photophobia, likely VZV- meningitis   Temp 100.1 on admission, normal CBC; CSF  WBC reportedly elevated per ER(no result on lab, but has 93% lymphocytes on differential)  and elevated protein, no organisms on gram stain; CSF culture, meningitis/encephalitis panel sent and started on IV vancomycin Rocephin and acyclovir and admitted.  * CSF meningitis/encephalitis panel showed positive varicella-zoster virus.  * Head CT normal    Evaluated by ID, IV antibiotics discontinued and continuing with IV acyclovir only. Plan is short course of antiviral IV while in hospital, and PO at discharge. Appreciate recommendation.     Patient had developed ANABEL on IV aclovir.     Discussed with ID, recommends 7 full days of IV acyclovir, through 5/27.     Acute kidney injury: suspect related to acyclovir and toradol. ? Crystal-induced tubular injury.     Patient developed Cr 0.65 > 1.65 Toradol stopped, he received IV fluids, currently creatinine is much better, 1.2, will repeat BMP in AM.  Plan is to continue acyclovir till tomorrow.        Mild Hypokalemia    K normal but on IVF with NS > give 20 meq KCl now.     Ordered replacement protocol.      Elevated blood pressure    No prior history of hypertension.  Suspect related to headache    No indication for urgent treatment as inpatient unless severe (>180/110) or  symptomatic. Consider addition of oral medications if remains significantly elevated.     Elevated BS, history of pre-diabetes       Follow QID BS with correction dose insulin     Diabetic diet     Nutrition consult.       Anxiety disorder  panic disorder    Continue PTA Paxil and Ativan as needed     Substance abuse disorder  H/o methamphetamine and cocaine abuse  * Urine toxicology positive for cocaine; denies any IV drug use    counseled on drug cessation    chem dep eval appreciated > provided resources on 5/23.     GI PPX - stopped famotidine on 05/25/22. No indication for GI PPX      Obesity: BMI 29  Diet: Orders Placed This Encounter      High Consistent Carb (75 g CHO per Meal) Diet     Melara Catheter: Not present     DVT Prophylaxis: Low Risk/Ambulatory with no VTE prophylaxis indicated and Pneumatic Compression Devices  Code Status: Full Code     Disposition: Expected discharge 5/27    Nelia Alanis MD  Hospitalist Service  [facility]  Securely message with the Vocera Web Console (learn more here)  Text page via [employer] Paging/Directory      -Data reviewed today: I reviewed all new labs and imaging results over the last 24 hours. I personally reviewed no images or EKG's today.    Physical Exam   Temp: 98.1  F (36.7  C) Temp src: Oral BP: (!) 162/101 Pulse: 63   Resp: 18 SpO2: 95 % O2 Device: None (Room air)    Vitals:    05/21/22 0224   Weight: 95.3 kg (210 lb)     Vital Signs with Ranges  Temp:  [98  F (36.7  C)-98.4  F (36.9  C)] 98.1  F (36.7  C)  Pulse:  [63-73] 63  Resp:  [18] 18  BP: (146-167)/() 162/101  SpO2:  [95 %-98 %] 95 %  I/O last 3 completed shifts:  In: 1357.08 [P.O.:480; I.V.:877.08]  Out: 950 [Urine:950]    Constitutional: Awake, alert, cooperative, no apparent distress  Respiratory: Clear to auscultation bilaterally, no crackles or wheezing  Cardiovascular: Regular rate and rhythm, normal S1 and S2, and no murmur noted  GI: Normal bowel sounds, soft,  non-distended, non-tender  Skin/Integumen/MSK: No rashes, no cyanosis, no edema  Neuro : moving all 4 extremities, no focal deficit noted       Medications        sodium chloride 125 mL/hr at 05/25/22 0959       acyclovir (ZOVIRAX) IV  10 mg/kg Intravenous Q8H     insulin aspart  1-7 Units Subcutaneous TID AC     insulin aspart  1-5 Units Subcutaneous At Bedtime     PARoxetine  30 mg Oral At Bedtime     senna-docusate  1 tablet Oral BID    Or     senna-docusate  2 tablet Oral BID     sodium chloride (PF)  3 mL Intracatheter Q8H       Data   Recent Labs   Lab 05/26/22  1404 05/26/22  0754 05/26/22  0725 05/25/22  1154 05/25/22  0829 05/24/22  1139 05/23/22  0827 05/22/22  0756 05/21/22  0349 05/21/22  0233 05/20/22  0709   WBC  --   --   --   --   --   --  8.7 9.7 9.3  --  9.5   HGB  --   --   --   --   --   --  14.3 13.9 14.3  --  14.7   MCV  --   --   --   --   --   --  86 87 89  --  86   PLT  --   --   --   --   --   --  221 221 231  --  240   NA  --   --  142  --  140 141 138 137 138  --  135   POTASSIUM  --   --  4.2  --  3.5 3.3* 3.5 3.4 3.8  --  3.7   CHLORIDE  --   --  111*  --  109 108 105 104 105  --  102   CO2  --   --  27  --  25 26 27 28 24  --  24   BUN  --   --  14  --  16 20 8 9 12  --  10   CR  --   --  1.21  --  1.47* 1.65* 0.65* 0.60* 0.84  --  0.57*   ANIONGAP  --   --  4  --  6 7 6 5 9  --  9   HORTENSIA  --   --  9.1  --  8.7 8.7 8.6 8.4* 8.3*  --  9.1   GLC 92 135* 141*   < > 152* 196* 124* 116* 211*   < > 138*   ALBUMIN  --   --   --   --   --   --  3.4  --   --   --  3.6   PROTTOTAL  --   --   --   --   --   --  7.0  --   --   --  7.5   BILITOTAL  --   --   --   --   --   --  1.0  --   --   --  1.5*   ALKPHOS  --   --   --   --   --   --  73  --   --   --  86   ALT  --   --   --   --   --   --  68  --   --   --  62   AST  --   --   --   --   --   --  32  --   --   --  20    < > = values in this interval not displayed.       No results found for this or any previous visit (from the past 24  hour(s)).

## 2022-05-26 NOTE — PROGRESS NOTES
"INFECTIOUS DISEASES PROGRESS NOTE    Assessment:  Patient with PMH polysubstance use admitted 5/20 with headache, malaise - found to have VZV meningitis. Had some initial visual blurring, but is now resolving and headache is slowly improving on acyclovir.     Recommendations:  -Continue acyclovir IV for 7 days then another 7 days of PO valacyclovir 1 gm TID with frequent creatinine check and adequate hydration  - watch kidney function   -HIV negative   - adequate hydration while on acyclovir       Patient Active Problem List   Diagnosis Code     Anxiety F41.9     Panic attack F41.0     CARDIOVASCULAR SCREENING; LDL GOAL LESS THAN 160 Z13.6     Overweight (BMI 25.0-29.9) E66.3     Health Care Home Z76.89     Migraine G43.909     Cocaine abuse (H) F14.10     Substance abuse (H) F19.10     Methamphetamine abuse (H) F15.10     TONNY (generalized anxiety disorder) F41.1     Meningitis due to herpes zoster virus B02.1     Acute kidney failure, unspecified (H) N17.9       -------------------------------------------------------------------------------------------------------------------------------------------------------------------------  Subjective/Interval events:  -Afeb  Feeling a little better today, but headache still present. Is about an 8 today. No visual changes, no rashes.     Physical Exam:  BP (!) 162/101 (BP Location: Right arm, Patient Position: Semi-Matthews's)   Pulse 63   Temp 98.1  F (36.7  C) (Oral)   Resp 18   Ht 1.803 m (5' 11\")   Wt 95.3 kg (210 lb)   SpO2 95%   BMI 29.29 kg/m       GENERAL:  nad  ENT:  Head is normocephalic, atraumatic.   EYES:  Eyes have anicteric sclerae.    NECK:  Supple.  SKIN:  No acute rashes.    NEUROLOGIC:  Conversational, CUETO    Laboratory Data:  Creatinine   Date Value Ref Range Status   05/26/2022 1.21 0.66 - 1.25 mg/dL Final   05/25/2022 1.47 (H) 0.66 - 1.25 mg/dL Final   05/24/2022 1.65 (H) 0.66 - 1.25 mg/dL Final   05/23/2022 0.65 (L) 0.66 - 1.25 mg/dL Final "   05/22/2022 0.60 (L) 0.66 - 1.25 mg/dL Final   04/04/2021 0.63 (L) 0.66 - 1.25 mg/dL Final   04/03/2021 0.66 0.66 - 1.25 mg/dL Final   04/02/2021 0.59 (L) 0.66 - 1.25 mg/dL Final   04/01/2021 0.67 0.66 - 1.25 mg/dL Final   03/31/2021 0.77 0.66 - 1.25 mg/dL Final     WBC   Date Value Ref Range Status   04/04/2021 8.6 4.0 - 11.0 10e9/L Final   04/03/2021 7.2 4.0 - 11.0 10e9/L Final   04/02/2021 9.0 4.0 - 11.0 10e9/L Final   04/01/2021 6.4 4.0 - 11.0 10e9/L Final   03/31/2021 6.1 4.0 - 11.0 10e9/L Final     WBC Count   Date Value Ref Range Status   05/23/2022 8.7 4.0 - 11.0 10e3/uL Final   05/22/2022 9.7 4.0 - 11.0 10e3/uL Final   05/21/2022 9.3 4.0 - 11.0 10e3/uL Final   05/20/2022 9.5 4.0 - 11.0 10e3/uL Final   08/30/2021 9.1 4.0 - 11.0 10e3/uL Final     Hemoglobin   Date Value Ref Range Status   05/23/2022 14.3 13.3 - 17.7 g/dL Final   04/04/2021 13.5 13.3 - 17.7 g/dL Final     Platelet Count   Date Value Ref Range Status   05/23/2022 221 150 - 450 10e3/uL Final   04/04/2021 401 150 - 450 10e9/L Final     Lab Results   Component Value Date     05/26/2022    BUN 14 05/26/2022    CO2 27 05/26/2022     CRP Inflammation   Date Value Ref Range Status   04/04/2021 12.9 (H) 0.0 - 8.0 mg/L Final   04/03/2021 28.1 (H) 0.0 - 8.0 mg/L Final   04/02/2021 63.2 (H) 0.0 - 8.0 mg/L Final   04/01/2021 131.0 (H) 0.0 - 8.0 mg/L Final   03/31/2021 124.0 (H) 0.0 - 8.0 mg/L Final        Micro:  No results for input(s): CULT, SDES in the last 168 hours.    Imaging:  Recent Results (from the past 48 hour(s))   CT Head w/o Contrast    Narrative    CT SCAN OF THE HEAD WITHOUT CONTRAST   5/20/2022 7:20 AM     HISTORY: Headache.    TECHNIQUE:  Axial images of the head and coronal reformations without  IV contrast material. Radiation dose for this scan was reduced using  automated exposure control, adjustment of the mA and/or kV according  to patient size, or iterative reconstruction technique.    COMPARISON: Head CT  4/5/2022.    FINDINGS: There is no evidence of intracranial hemorrhage, mass, acute  infarct or anomaly. The ventricles are normal in size, shape and  configuration. The brain parenchyma and subarachnoid spaces are  normal.     The visualized portions of the sinuses and mastoids appear normal. The  bony calvarium and bones of the skull base appear intact.       Impression    IMPRESSION:   No evidence of acute intracranial hemorrhage, mass, or  herniation.    CRICKET TRAORE MD         SYSTEM ID:  [id number]   CT Head w/o Contrast    Narrative    EXAM: CT HEAD W/O CONTRAST  LOCATION: [facility]  DATE/TIME: 5/21/2022 3:53 AM    INDICATION: Cerebral hemorrhage suspected, headache  COMPARISON: 04/05/2022  TECHNIQUE: Routine CT Head without IV contrast. Multiplanar reformats. Dose reduction techniques were used.    FINDINGS:  INTRACRANIAL CONTENTS: No intracranial hemorrhage, extraaxial collection, or mass effect.  No CT evidence of acute infarct. Normal parenchymal attenuation. Normal ventricles and sulci.     VISUALIZED ORBITS/SINUSES/MASTOIDS: No intraorbital abnormality. No paranasal sinus mucosal disease. No middle ear or mastoid effusion.    BONES/SOFT TISSUES: No acute abnormality.      Impression    IMPRESSION:  1.  No acute intracranial abnormality or significant change compared to the prior study.

## 2022-05-27 VITALS
OXYGEN SATURATION: 98 % | TEMPERATURE: 97.8 F | HEART RATE: 56 BPM | HEIGHT: 71 IN | WEIGHT: 210 LBS | BODY MASS INDEX: 29.4 KG/M2 | RESPIRATION RATE: 20 BRPM | DIASTOLIC BLOOD PRESSURE: 100 MMHG | SYSTOLIC BLOOD PRESSURE: 149 MMHG

## 2022-05-27 LAB
ANION GAP SERPL CALCULATED.3IONS-SCNC: 4 MMOL/L (ref 3–14)
BUN SERPL-MCNC: 12 MG/DL (ref 7–30)
CALCIUM SERPL-MCNC: 9.2 MG/DL (ref 8.5–10.1)
CHLORIDE BLD-SCNC: 109 MMOL/L (ref 94–109)
CO2 SERPL-SCNC: 26 MMOL/L (ref 20–32)
CREAT SERPL-MCNC: 0.92 MG/DL (ref 0.66–1.25)
GFR SERPL CREATININE-BSD FRML MDRD: >90 ML/MIN/1.73M2
GLUCOSE BLD-MCNC: 163 MG/DL (ref 70–99)
GLUCOSE BLDC GLUCOMTR-MCNC: 133 MG/DL (ref 70–99)
GLUCOSE BLDC GLUCOMTR-MCNC: 144 MG/DL (ref 70–99)
GLUCOSE BLDC GLUCOMTR-MCNC: 147 MG/DL (ref 70–99)
POTASSIUM BLD-SCNC: 4 MMOL/L (ref 3.4–5.3)
SODIUM SERPL-SCNC: 139 MMOL/L (ref 133–144)

## 2022-05-27 PROCEDURE — 36415 COLL VENOUS BLD VENIPUNCTURE: CPT | Performed by: INTERNAL MEDICINE

## 2022-05-27 PROCEDURE — 250N000012 HC RX MED GY IP 250 OP 636 PS 637: Performed by: INTERNAL MEDICINE

## 2022-05-27 PROCEDURE — 99239 HOSP IP/OBS DSCHRG MGMT >30: CPT | Performed by: INTERNAL MEDICINE

## 2022-05-27 PROCEDURE — 250N000011 HC RX IP 250 OP 636: Performed by: HOSPITALIST

## 2022-05-27 PROCEDURE — 258N000003 HC RX IP 258 OP 636: Performed by: HOSPITALIST

## 2022-05-27 PROCEDURE — 80048 BASIC METABOLIC PNL TOTAL CA: CPT | Performed by: INTERNAL MEDICINE

## 2022-05-27 PROCEDURE — 258N000003 HC RX IP 258 OP 636: Performed by: INTERNAL MEDICINE

## 2022-05-27 RX ORDER — VALACYCLOVIR HYDROCHLORIDE 1 G/1
1000 TABLET, FILM COATED ORAL 3 TIMES DAILY
Qty: 21 TABLET | Refills: 0 | Status: SHIPPED | OUTPATIENT
Start: 2022-05-28 | End: 2023-02-24

## 2022-05-27 RX ADMIN — SODIUM CHLORIDE: 9 INJECTION, SOLUTION INTRAVENOUS at 01:08

## 2022-05-27 RX ADMIN — INSULIN ASPART 1 UNITS: 100 INJECTION, SOLUTION INTRAVENOUS; SUBCUTANEOUS at 09:00

## 2022-05-27 RX ADMIN — ACYCLOVIR SODIUM 1000 MG: 50 INJECTION, SOLUTION INTRAVENOUS at 08:15

## 2022-05-27 ASSESSMENT — ACTIVITIES OF DAILY LIVING (ADL)
ADLS_ACUITY_SCORE: 18

## 2022-05-27 NOTE — DISCHARGE SUMMARY
[facility]    Discharge Summary  Hospitalist    Date of Admission:  5/21/2022  Date of Discharge:  5/27/2022  Discharging Provider: Nelia Alanis MD, MD    Discharge Diagnoses   Viral meningitis    History of Present Illness   Please review admission history and physical.    Hospital Course   Carlos A Connor was admitted on 5/21/2022.  The following problems were addressed during his hospitalization:    Principal Problem:    Meningitis due to herpes zoster virus  Active Problems:    Acute kidney failure, unspecified (H)  Carlos A Connor is a 41 year old male with PMH significant for anxiety, panic attack, h/o COVID pneumonia (4/2021), substance abuse (methamphetamine, cocaine) who presented to ED with headache, LP was done and finding consistent with viral meningitis and was admitted for further management.     Headache, photophobia, likely VZV- meningitis   Temp 100.1 on admission, normal CBC; CSF  WBC reportedly elevated per ER(no result on lab, but has 93% lymphocytes on differential)  and elevated protein, no organisms on gram stain; CSF culture, meningitis/encephalitis panel sent and started on IV vancomycin Rocephin and acyclovir and admitted.  * CSF meningitis/encephalitis panel showed positive varicella-zoster virus.  Head CT normal, he was seen by infectious disease, IV antibiotics were discontinued and patient was continued on IV acyclovir only, plan is to receive IV acyclovir while in the hospital for 7 days and then continue with another 7 days of p.o. valacyclovir 1000 mg 3 times daily.  Patient had developed acute kidney injury meanwhile, currently creatinine is close to baseline below 1.  Recommendations were given to continue aggressive hydration at home and monitor BMP occasionally.       Acute kidney injury: suspect related to acyclovir and toradol. ? Crystal-induced tubular injury.     Patient developed Cr 0.65 > 1.65 Toradol stopped, he received IV fluids,  currently creatinine is close to baseline.  Hypokalemia was managed with frequent replacement.          Elevated blood pressure    No prior history of hypertension.  Suspect related to headache    No indication for urgent treatment as inpatient unless severe (>180/110) or symptomatic. Consider addition of oral medications if remains significantly elevated.      Elevated BS, history of pre-diabetes       Encouraged weight loss and diet control.        Anxiety disorder  panic disorder    Continue PTA Paxil.     Substance abuse disorder  H/o methamphetamine and cocaine abuse  * Urine toxicology positive for cocaine; denies any IV drug use    counseled on drug cessation    chem dep eval appreciated > provided resources on 5/23.      GI PPX - stopped famotidine on 05/25/22. No indication for GI PPX      Obesity: BMI 29  Diet: Orders Placed This Encounter      High Consistent Carb (75 g CHO per Meal) Diet      Nelia Alanis MD    Significant Results and Procedures       Pending Results     Unresulted Labs Ordered in the Past 30 Days of this Admission     No orders found from 4/21/2022 to 5/22/2022.          Code Status   Full Code       Primary Care Physician   Physician No Ref-Primary    Physical Exam   Temp: 97.8  F (36.6  C) Temp src: Oral BP: (!) 149/100 Pulse: 56   Resp: 20 SpO2: 98 % O2 Device: None (Room air)    Vitals:    05/21/22 0224   Weight: 95.3 kg (210 lb)     Vital Signs with Ranges  Temp:  [97.8  F (36.6  C)-98.6  F (37  C)] 97.8  F (36.6  C)  Pulse:  [56-68] 56  Resp:  [18-20] 20  BP: (131-150)/() 149/100  SpO2:  [97 %-98 %] 98 %  I/O last 3 completed shifts:  In: 240 [P.O.:240]  Out: 1500 [Urine:1500]    The patient was examined on the day of discharge.    Discharge Disposition   Discharged to home  Condition at discharge: Stable    Consultations This Hospital Stay   PHARMACY TO DOSE VANCO  INFECTIOUS DISEASES IP CONSULT  CHEMICAL DEPENDENCY IP CONSULT  NUTRITION SERVICES ADULT IP CONSULT    Time  Spent on this Encounter   I, Nelia Alanis MD, personally saw the patient today and spent greater than 30 minutes discharging this patient.    Discharge Orders      Reason for your hospital stay    Viral meningitis     Follow-up and recommended labs and tests     Follow up with primary care provider, Physician No Ref-Primary, within 7 days for hospital follow- up.  The following labs/tests are recommended: basic metabolic panel to check creatinine twice in next 1 week while on valacyclovir..     Activity    Your activity upon discharge: activity as tolerated     Discharge Instructions    Drink plenty of fluids while on antivirals, check creatinine in labs as ordered.if headache worsens or develops fever please return to emergency room  .     Diet    Follow this diet upon discharge: Orders Placed This Encounter      High Consistent Carb (75 g CHO per Meal) Diet     Discharge Medications   Discharge Medication List as of 5/27/2022 10:32 AM      START taking these medications    Details   valACYclovir (VALTREX) 1000 mg tablet Take 1 tablet (1,000 mg) by mouth 3 times daily for 7 days, Disp-21 tablet, R-0, E-Prescribe         CONTINUE these medications which have NOT CHANGED    Details   LORazepam (ATIVAN) 0.5 MG tablet Take 0.25 mg by mouth daily as needed , Historical      PARoxetine (PAXIL) 30 MG tablet Take 30 mg by mouth At Bedtime, Historical           Allergies   Allergies   Allergen Reactions     Droperidol      Droperidol      Droperidol Other (See Comments)     Data   Most Recent 3 CBC's:Recent Labs   Lab Test 05/23/22  0827 05/22/22  0756 05/21/22  0349   WBC 8.7 9.7 9.3   HGB 14.3 13.9 14.3   MCV 86 87 89    221 231      Most Recent 3 BMP's:  Recent Labs   Lab Test 05/27/22  0817 05/27/22  0751 05/27/22  0201 05/26/22  0754 05/26/22  0725 05/25/22  1154 05/25/22  0829     --   --   --  142  --  140   POTASSIUM 4.0  --   --   --  4.2  --  3.5   CHLORIDE 109  --   --   --  111*  --  109   CO2 26   --   --   --  27  --  25   BUN 12  --   --   --  14  --  16   CR 0.92  --   --   --  1.21  --  1.47*   ANIONGAP 4  --   --   --  4  --  6   HORTENSIA 9.2  --   --   --  9.1  --  8.7   * 144* 133*   < > 141*   < > 152*    < > = values in this interval not displayed.     Most Recent 2 LFT's:  Recent Labs   Lab Test 05/23/22  0827 05/20/22  0709   AST 32 20   ALT 68 62   ALKPHOS 73 86   BILITOTAL 1.0 1.5*     Most Recent INR's and Anticoagulation Dosing History:  Anticoagulation Dose History     Recent Dosing and Labs Latest Ref Rng & Units 3/31/2021    INR 0.86 - 1.14 0.89        Most Recent 3 Troponin's:  Recent Labs   Lab Test 08/30/21  1101 03/31/21  1823 10/26/20  1104 10/26/20  0856   TROPI  --  <0.015 <0.015 <0.015   TROPONIN <0.015  --   --   --      Most Recent Cholesterol Panel:  Recent Labs   Lab Test 09/02/16  1055   CHOL 210*   *   HDL 45   TRIG 203*     Most Recent 6 Bacteria Isolates From Any Culture (See EPIC Reports for Culture Details):No lab results found.  Most Recent TSH, T4 and A1c Labs:  Recent Labs   Lab Test 05/25/22  0844 04/01/21  0606 05/19/18  1136   TSH  --   --  1.00   A1C 6.2*   < >  --     < > = values in this interval not displayed.     Results for orders placed or performed during the hospital encounter of 05/21/22   CT Head w/o Contrast    Narrative    EXAM: CT HEAD W/O CONTRAST  LOCATION: [facility]  DATE/TIME: 5/21/2022 3:53 AM    INDICATION: Cerebral hemorrhage suspected, headache  COMPARISON: 04/05/2022  TECHNIQUE: Routine CT Head without IV contrast. Multiplanar reformats. Dose reduction techniques were used.    FINDINGS:  INTRACRANIAL CONTENTS: No intracranial hemorrhage, extraaxial collection, or mass effect.  No CT evidence of acute infarct. Normal parenchymal attenuation. Normal ventricles and sulci.     VISUALIZED ORBITS/SINUSES/MASTOIDS: No intraorbital abnormality. No paranasal sinus mucosal disease. No middle ear or mastoid  effusion.    BONES/SOFT TISSUES: No acute abnormality.      Impression    IMPRESSION:  1.  No acute intracranial abnormality or significant change compared to the prior study.

## 2022-05-27 NOTE — PLAN OF CARE
Goal Outcome Evaluation:    Plan of Care Reviewed With: patient     Overall Patient Progress: improving     Summary:  Viral meningitis    DATE & TIME: 5/27/22 8399-1808  Cognitive Concerns/ Orientation : A&O x 4  BEHAVIOR & AGGRESSION TOOL COLOR: Green  ABNL VS/O2: VSS on RA- monitor for HTN   MOBILITY: Independent  PAIN MANAGMENT: denies pain   DIET: High consistent card 75g per meal   BOWEL/BLADDER: Continent  ABNL LAB/BG:   DRAIN/DEVICES:  PIV infusing NS 125ml/hr    SKIN: Abrasion/scratch marks to right upper arm. Band aid to lower back at previous LP site  D/C DATE: Pending completion of antiviral treatment.  OTHER IMPORTANT INFO: Droplet precautions maintained. Monitor extravasation site on left arm per protocol. Headaches have improved since admission. Acyclovir q8h.

## 2022-05-27 NOTE — PLAN OF CARE
Discharge    Patient discharged to home via with family.   Care plan note:  DATE & TIME: 5/27/22 0700-discharge  Cognitive Concerns/ Orientation : A&O x 4, Kyrgyz and english speaking.   BEHAVIOR & AGGRESSION TOOL COLOR: Green  ABNL VS/O2: VSS on RA  MOBILITY: Independent  PAIN MANAGMENT: denies pain   DIET: High consistent carb diet, tolerating   BOWEL/BLADDER: Continent, up to bathroom   ABNL LAB/BG:   DRAIN/DEVICES:  n/a  SKIN:  Band aid to lower back at previous LP site, WDL. L arm previous extravasation site, some redness, stable.   D/C DATE: to home today on oral anitvirals   OTHER IMPORTANT INFO: LS clear, denies SOB. Denies headaches.     Listed belongings gathered and given to patient (including from security/pharmacy). Yes  Care Plan and Patient education resolved: Yes  Prescriptions if needed, hard copies sent with patient  Yes  Medication Bin checked and emptied on discharge Yes  SW/care coordinator/charge RN aware of discharge: Yes      Goal Outcome Evaluation: plan of care reviewed with patient, adequate for discharge today.

## 2022-05-27 NOTE — PLAN OF CARE
Goal Outcome Evaluation:        Summary:  Viral meningitis    DATE & TIME: 5/25/22- 5/26/22 5207-7954  Cognitive Concerns/ Orientation : A&O x 4  BEHAVIOR & AGGRESSION TOOL COLOR: Green  ABNL VS/O2: VSS on RA- HTN noted 5/25 MD note says to just watch BP unless it gets above 180 systolic or 110 diastolic  MOBILITY: Independent  PAIN MANAGMENT: 1/10 pain for very minor headache-denied any pain medication   BOWEL/BLADDER: Continent  ABNL LAB/BG: Creat 1.47, A1C 6.2, , 137.   DRAIN/DEVICES:   SKIN: Abrasion/scratch marks to right upper arm. Band aid to lower back at previous LP site  D/C DATE: Pending completion of antiviral treatment.  OTHER IMPORTANT INFO: Droplet precautions maintained. Monitor extravasation site on left arm per protocol.

## 2022-05-28 DIAGNOSIS — Z71.89 OTHER SPECIFIED COUNSELING: ICD-10-CM

## 2022-05-29 ENCOUNTER — PATIENT OUTREACH (OUTPATIENT)
Dept: CARE COORDINATION | Facility: CLINIC | Age: 42
End: 2022-05-29
Payer: MEDICAID

## 2022-05-29 NOTE — PROGRESS NOTES
Clinic Care Coordination Contact  [facility]/Voicemail       Clinical Data: Care Coordinator Outreach  Outreach attempted x 1.  Left message on patient's voicemail with call back information and requested return call.  Plan: Care Coordinator will try to reach patient again in 1-2 business days.    .Pattie CONLEY Community Health Worker  Clinic Care Coordination  [facility]  Phone: [phone]

## 2022-05-31 NOTE — PROGRESS NOTES
Clinic Care Coordination Contact  [facility]/Voicemail       Clinical Data: Care Coordinator Outreach  Outreach attempted x 2.  Left message on patient's voicemail with call back information and requested return call.  Plan:  Care Coordinator will do no further outreaches at this time.    Pattie CONLEY Community Health Worker  Clinic Care Coordination  [facility]  Phone: [phone]

## 2023-01-26 ENCOUNTER — HOSPITAL ENCOUNTER (EMERGENCY)
Facility: CLINIC | Age: 43
Discharge: HOME | End: 2023-01-26
Attending: EMERGENCY MEDICINE | Admitting: EMERGENCY MEDICINE
Payer: MEDICAID

## 2023-01-26 ENCOUNTER — APPOINTMENT (OUTPATIENT)
Dept: GENERAL RADIOLOGY | Facility: CLINIC | Age: 43
End: 2023-01-26
Attending: EMERGENCY MEDICINE
Payer: MEDICAID

## 2023-01-26 VITALS
RESPIRATION RATE: 18 BRPM | HEART RATE: 76 BPM | OXYGEN SATURATION: 97 % | DIASTOLIC BLOOD PRESSURE: 97 MMHG | TEMPERATURE: 97.8 F | SYSTOLIC BLOOD PRESSURE: 147 MMHG

## 2023-01-26 DIAGNOSIS — M25.562 ACUTE PAIN OF LEFT KNEE: ICD-10-CM

## 2023-01-26 DIAGNOSIS — M10.9 ACUTE GOUT OF LEFT KNEE, UNSPECIFIED CAUSE: ICD-10-CM

## 2023-01-26 LAB
ANION GAP SERPL CALCULATED.3IONS-SCNC: 11 MMOL/L (ref 7–15)
BASOPHILS # BLD AUTO: 0 10E3/UL (ref 0–0.2)
BASOPHILS NFR BLD AUTO: 0 %
BUN SERPL-MCNC: 13.6 MG/DL (ref 6–20)
CALCIUM SERPL-MCNC: 9.8 MG/DL (ref 8.6–10)
CHLORIDE SERPL-SCNC: 104 MMOL/L (ref 98–107)
CREAT SERPL-MCNC: 0.59 MG/DL (ref 0.67–1.17)
DEPRECATED HCO3 PLAS-SCNC: 23 MMOL/L (ref 22–29)
EOSINOPHIL # BLD AUTO: 0 10E3/UL (ref 0–0.7)
EOSINOPHIL NFR BLD AUTO: 0 %
ERYTHROCYTE [DISTWIDTH] IN BLOOD BY AUTOMATED COUNT: 12.7 % (ref 10–15)
GFR SERPL CREATININE-BSD FRML MDRD: >90 ML/MIN/1.73M2
GLUCOSE SERPL-MCNC: 176 MG/DL (ref 70–99)
HCT VFR BLD AUTO: 42.8 % (ref 40–53)
HGB BLD-MCNC: 14.6 G/DL (ref 13.3–17.7)
IMM GRANULOCYTES # BLD: 0.1 10E3/UL
IMM GRANULOCYTES NFR BLD: 1 %
LYMPHOCYTES # BLD AUTO: 1.3 10E3/UL (ref 0.8–5.3)
LYMPHOCYTES NFR BLD AUTO: 12 %
MCH RBC QN AUTO: 29.1 PG (ref 26.5–33)
MCHC RBC AUTO-ENTMCNC: 34.1 G/DL (ref 31.5–36.5)
MCV RBC AUTO: 85 FL (ref 78–100)
MONOCYTES # BLD AUTO: 0.4 10E3/UL (ref 0–1.3)
MONOCYTES NFR BLD AUTO: 3 %
NEUTROPHILS # BLD AUTO: 8.6 10E3/UL (ref 1.6–8.3)
NEUTROPHILS NFR BLD AUTO: 84 %
NRBC # BLD AUTO: 0 10E3/UL
NRBC BLD AUTO-RTO: 0 /100
PLATELET # BLD AUTO: 205 10E3/UL (ref 150–450)
POTASSIUM SERPL-SCNC: 4.5 MMOL/L (ref 3.4–5.3)
RBC # BLD AUTO: 5.02 10E6/UL (ref 4.4–5.9)
SODIUM SERPL-SCNC: 138 MMOL/L (ref 136–145)
URATE SERPL-MCNC: 7.4 MG/DL (ref 3.4–7)
WBC # BLD AUTO: 10.3 10E3/UL (ref 4–11)

## 2023-01-26 PROCEDURE — 73562 X-RAY EXAM OF KNEE 3: CPT | Mod: LT

## 2023-01-26 PROCEDURE — 85025 COMPLETE CBC W/AUTO DIFF WBC: CPT | Performed by: EMERGENCY MEDICINE

## 2023-01-26 PROCEDURE — 250N000013 HC RX MED GY IP 250 OP 250 PS 637: Performed by: EMERGENCY MEDICINE

## 2023-01-26 PROCEDURE — 36415 COLL VENOUS BLD VENIPUNCTURE: CPT | Performed by: EMERGENCY MEDICINE

## 2023-01-26 PROCEDURE — 84550 ASSAY OF BLOOD/URIC ACID: CPT | Performed by: EMERGENCY MEDICINE

## 2023-01-26 PROCEDURE — 99284 EMERGENCY DEPT VISIT MOD MDM: CPT

## 2023-01-26 PROCEDURE — 80048 BASIC METABOLIC PNL TOTAL CA: CPT | Performed by: EMERGENCY MEDICINE

## 2023-01-26 RX ORDER — OXYCODONE HYDROCHLORIDE 5 MG/1
5 TABLET ORAL EVERY 6 HOURS PRN
Qty: 4 TABLET | Refills: 0 | Status: SHIPPED | OUTPATIENT
Start: 2023-01-26 | End: 2023-01-29

## 2023-01-26 RX ORDER — OXYCODONE HYDROCHLORIDE 5 MG/1
5 TABLET ORAL ONCE
Status: COMPLETED | OUTPATIENT
Start: 2023-01-26 | End: 2023-01-26

## 2023-01-26 RX ORDER — ACETAMINOPHEN 500 MG
1000 TABLET ORAL ONCE
Status: COMPLETED | OUTPATIENT
Start: 2023-01-26 | End: 2023-01-26

## 2023-01-26 RX ORDER — IBUPROFEN 200 MG
200 TABLET ORAL EVERY 6 HOURS PRN
Status: DISCONTINUED | OUTPATIENT
Start: 2023-01-26 | End: 2023-01-26 | Stop reason: HOSPADM

## 2023-01-26 RX ORDER — COLCHICINE 0.6 MG/1
TABLET ORAL
Qty: 3 TABLET | Refills: 0 | Status: SHIPPED | OUTPATIENT
Start: 2023-01-26 | End: 2023-02-24

## 2023-01-26 RX ADMIN — OXYCODONE HYDROCHLORIDE 5 MG: 5 TABLET ORAL at 11:36

## 2023-01-26 RX ADMIN — IBUPROFEN 200 MG: 200 TABLET, FILM COATED ORAL at 08:39

## 2023-01-26 RX ADMIN — ACETAMINOPHEN 1000 MG: 500 TABLET ORAL at 08:38

## 2023-01-26 ASSESSMENT — ACTIVITIES OF DAILY LIVING (ADL): ADLS_ACUITY_SCORE: 35

## 2023-01-26 ASSESSMENT — ENCOUNTER SYMPTOMS: ARTHRALGIAS: 1

## 2023-01-26 NOTE — ED PROVIDER NOTES
History     Chief Complaint:  Knee Pain       The history is provided by the patient.      Carlos A Connor is a 42 year old male who presents with left knee pain. He reports pain along the top of the left knee. He notes pain starting slightly last night, but then at midnight it was severe and woke him up. Carlos A took Tylenol and ibuprofen at home. The knee is very painful to bend. He denies other pain in the leg. He notes past episode of right heel/ankle pain that was similar but then resolved, but no known history of gout.  He reports that his brother does have a history of gout.  He does not do lots of bending or lifting at work.  No trauma he says he has not used any IV drugs in a long time.  No fevers, spreading redness or warmth, vomiting or altered mental status    Independent Historian: patient provided history    Review of External Notes: 5/21/22 admission notes    ROS:  Review of Systems   Musculoskeletal: Positive for arthralgias (left knee).   All other systems reviewed and are negative.    Allergies:  Droperidol    Medications:    Ativan  Paxil  Valtrex    Past Medical History:    Anxiety  Migraines  Cocaine abuse  Methamphetamine abuse  Meningitis due to herpes zoster virus  Acute kidney failure  Panic attack    Social History:  The patient presents alone.   He says he has not used any IV drugs in a long time.     Physical Exam     Patient Vitals for the past 24 hrs:   BP Temp Temp src Pulse Resp SpO2   01/26/23 1200 (!) 144/87 -- -- 66 -- 95 %   01/26/23 1100 (!) 146/89 -- -- 67 -- --   01/26/23 1045 (!) 157/93 97.8  F (36.6  C) Oral 68 18 97 %   01/26/23 0838 (!) 153/98 97.1  F (36.2  C) Temporal -- 22 --   01/26/23 0836 -- -- -- 58 23 95 %        Physical Exam  General: Well-nourished, appears to be resting comfortably when I enter the room  Eyes: PERRL, conjunctivae pink no scleral icterus or conjunctival injection  ENT:  Moist mucus membranes, posterior oropharynx clear without erythema or  exudates  Respiratory:  Lungs clear to auscultation bilaterally, no crackles/rubs/wheezes.  Good air movement  CV: Normal rate and rhythm, no murmurs/rubs/gallops.  Normal symmetric distal DP pulses.  Normal distal capillary refill and temperature.  GI:  Abdomen soft and non-distended.  Normoactive BS.  No tenderness, guarding or rebound  Skin: Warm, dry.  No rashes or petechiae  Musculoskeletal: Left knee is tender to light touch.  No appreciable effusion.  Main tenderness is over the patella and above the knee.  Slightly warmer on the left than the right but no overlying redness at all.  Patient is able to range but with significant pain.  No tenderness over the posterior popliteal space or posterior calf.  Neuro: Alert and oriented to person/place/time  Psychiatric: Normal affect    Emergency Department Course     Imaging:  XR Knee Left 3 Views   Final Result   IMPRESSION: Normal joint spaces and alignment. No acute displaced left   knee fracture. No left knee joint effusion. No significant anterior   left knee soft tissue swelling. Enthesophyte at the distal quadriceps   insertion on the patella.      LANIE EMERY MD            SYSTEM ID:  [id number]      Report per radiology    Laboratory:  Labs Ordered and Resulted from Time of ED Arrival to Time of ED Departure   BASIC METABOLIC PANEL - Abnormal       Result Value    Sodium 138      Potassium 4.5      Chloride 104      Carbon Dioxide (CO2) 23      Anion Gap 11      Urea Nitrogen 13.6      Creatinine 0.59 (*)     Calcium 9.8      Glucose 176 (*)     GFR Estimate >90     URIC ACID - Abnormal    Uric Acid 7.4 (*)    CBC WITH PLATELETS AND DIFFERENTIAL - Abnormal    WBC Count 10.3      RBC Count 5.02      Hemoglobin 14.6      Hematocrit 42.8      MCV 85      MCH 29.1      MCHC 34.1      RDW 12.7      Platelet Count 205      % Neutrophils 84      % Lymphocytes 12      % Monocytes 3      % Eosinophils 0      % Basophils 0      % Immature Granulocytes 1      NRBCs  per 100 WBC 0      Absolute Neutrophils 8.6 (*)     Absolute Lymphocytes 1.3      Absolute Monocytes 0.4      Absolute Eosinophils 0.0      Absolute Basophils 0.0      Absolute Immature Granulocytes 0.1      Absolute NRBCs 0.0          Emergency Department Course & Assessments:       Interventions:  Medications   ibuprofen (ADVIL/MOTRIN) tablet 200 mg (200 mg Oral Given 1/26/23 0839)   acetaminophen (TYLENOL) tablet 1,000 mg (1,000 mg Oral Given 1/26/23 0838)   oxyCODONE (ROXICODONE) tablet 5 mg (5 mg Oral Given 1/26/23 1136)        Independent Interpretation (X-rays, CTs, rhythm strip):  Reviewed and agree with above x-ray interpretation.      Assessments:  1050 I obtained history and examined the patient as noted above.   1115 I rechecked and updated the patient.   1211 I rechecked the patient and explained findings.     Disposition:  The patient was discharged to home.     Impression & Plan      Medical Decision Making:  Carlos A Connor is a 42 year old male who presents for evaluation of atraumatic left knee pain. There is minimal swelling and no appreciable effusion on examination or on knee x-ray.  No abnormalities on the x-ray.  I do not see any signs of redness or warmth, fever or other symptoms to suggest this is a septic joint.  I do note that he has a history of polysubstance abuse but he reports that he has not been using injection drugs for quite a while.  He has no heart murmur I do not believe he has infective endocarditis.  His white blood cell count is normal.  His kidney function today is normal.  Vital signs are all normal.  I did obtain a uric acid which is not sensitive during a gout attack as it can be lowered but it was elevated which also raises my suspicion that this is gout.  Based on the history, physical examination and evaluation we elected to start colchicine for this acute gouty flare.  He was given crutches for comfort.  Supportive outpatient management indicated.  Should see  primary in next 1-2 days for recheck. Gout information given form home.      Diagnosis:    ICD-10-CM    1. Acute pain of left knee  M25.562 Crutches Order      2. Acute gout of left knee, unspecified cause  M10.9 Crutches Order         Discharge Medications:  New Prescriptions    COLCHICINE (COLCYRS) 0.6 MG TABLET    Take 1.2 mg followed by 0.6 mg after 1 hour. Maximum total dose: 1.8 mg/day on day 1    OXYCODONE (ROXICODONE) 5 MG TABLET    Take 1 tablet (5 mg) by mouth every 6 hours as needed for pain        Scribe Disclosure:  IRegina, am serving as a scribe at 10:54 AM on 1/26/2023 to document services personally performed by Pretty Hoyos MD based on my observations and the provider's statements to me.     1/26/2023   Pretty Hoyos MD Cho, Amy C, MD  01/26/23 4451

## 2023-01-26 NOTE — ED TRIAGE NOTES
Woke up this morning with severe left knee pain, denies injury. Patient took 400mg ibuprofen pta. Pain disproportionate to presentation. Left knee hot to touch, right knee not. No redness noted to left knee.

## 2023-01-26 NOTE — ED NOTES
Patient transferred to ED 02 from triage for L knee pain onset this morning with swelling. On arrival to room patient is vitally stable not reporting chest pain, SOB, and reporting significant L knee pain with mild swelling.

## 2023-01-26 NOTE — DISCHARGE INSTRUCTIONS
*You may resume diet and activities.  *Take medications as prescribed.  Ibuprofen and/or Tylenol as wrecked as needed for pain.  Oxycodone only for severe pain not read by ibuprofen and Tylenol.  Colchicine as directed.  Continue your current medications.  *Follow-up with your primary care doctor in the next few days.  You may need to be started on allopurinol or another medication for your high uric acid levels.  *Return if you develop fever, spreading redness or warmth, faint or feel like you will faint or become worse in any way.

## 2023-02-24 ENCOUNTER — APPOINTMENT (OUTPATIENT)
Dept: GENERAL RADIOLOGY | Facility: CLINIC | Age: 43
End: 2023-02-24
Attending: EMERGENCY MEDICINE
Payer: MEDICAID

## 2023-02-24 ENCOUNTER — HOSPITAL ENCOUNTER (EMERGENCY)
Facility: CLINIC | Age: 43
Discharge: HOME | End: 2023-02-24
Attending: EMERGENCY MEDICINE | Admitting: EMERGENCY MEDICINE
Payer: MEDICAID

## 2023-02-24 VITALS
DIASTOLIC BLOOD PRESSURE: 98 MMHG | HEART RATE: 85 BPM | OXYGEN SATURATION: 96 % | HEIGHT: 71 IN | TEMPERATURE: 97.5 F | RESPIRATION RATE: 16 BRPM | WEIGHT: 210 LBS | BODY MASS INDEX: 29.4 KG/M2 | SYSTOLIC BLOOD PRESSURE: 148 MMHG

## 2023-02-24 DIAGNOSIS — R07.9 CHEST PAIN, UNSPECIFIED TYPE: ICD-10-CM

## 2023-02-24 DIAGNOSIS — R94.31 ABNORMAL ELECTROCARDIOGRAM: ICD-10-CM

## 2023-02-24 LAB
ALBUMIN SERPL BCG-MCNC: 4.5 G/DL (ref 3.5–5.2)
ALP SERPL-CCNC: 115 U/L (ref 40–129)
ALT SERPL W P-5'-P-CCNC: 38 U/L (ref 10–50)
ANION GAP SERPL CALCULATED.3IONS-SCNC: 13 MMOL/L (ref 7–15)
AST SERPL W P-5'-P-CCNC: 17 U/L (ref 10–50)
BASOPHILS # BLD AUTO: 0.1 10E3/UL (ref 0–0.2)
BASOPHILS NFR BLD AUTO: 1 %
BILIRUB SERPL-MCNC: 0.6 MG/DL
BUN SERPL-MCNC: 10.8 MG/DL (ref 6–20)
CALCIUM SERPL-MCNC: 9.4 MG/DL (ref 8.6–10)
CHLORIDE SERPL-SCNC: 100 MMOL/L (ref 98–107)
CREAT SERPL-MCNC: 0.63 MG/DL (ref 0.67–1.17)
D DIMER PPP FEU-MCNC: <0.27 UG/ML FEU (ref 0–0.5)
DEPRECATED HCO3 PLAS-SCNC: 27 MMOL/L (ref 22–29)
EOSINOPHIL # BLD AUTO: 0.2 10E3/UL (ref 0–0.7)
EOSINOPHIL NFR BLD AUTO: 3 %
ERYTHROCYTE [DISTWIDTH] IN BLOOD BY AUTOMATED COUNT: 12.3 % (ref 10–15)
GFR SERPL CREATININE-BSD FRML MDRD: >90 ML/MIN/1.73M2
GLUCOSE SERPL-MCNC: 207 MG/DL (ref 70–99)
HCT VFR BLD AUTO: 45 % (ref 40–53)
HGB BLD-MCNC: 14.7 G/DL (ref 13.3–17.7)
HOLD SPECIMEN: NORMAL
IMM GRANULOCYTES # BLD: 0 10E3/UL
IMM GRANULOCYTES NFR BLD: 0 %
LYMPHOCYTES # BLD AUTO: 3 10E3/UL (ref 0.8–5.3)
LYMPHOCYTES NFR BLD AUTO: 44 %
MCH RBC QN AUTO: 28.9 PG (ref 26.5–33)
MCHC RBC AUTO-ENTMCNC: 32.7 G/DL (ref 31.5–36.5)
MCV RBC AUTO: 89 FL (ref 78–100)
MONOCYTES # BLD AUTO: 0.5 10E3/UL (ref 0–1.3)
MONOCYTES NFR BLD AUTO: 8 %
NEUTROPHILS # BLD AUTO: 3.1 10E3/UL (ref 1.6–8.3)
NEUTROPHILS NFR BLD AUTO: 44 %
NRBC # BLD AUTO: 0 10E3/UL
NRBC BLD AUTO-RTO: 0 /100
PLATELET # BLD AUTO: 231 10E3/UL (ref 150–450)
POTASSIUM SERPL-SCNC: 3.6 MMOL/L (ref 3.4–5.3)
PROT SERPL-MCNC: 7.3 G/DL (ref 6.4–8.3)
RBC # BLD AUTO: 5.08 10E6/UL (ref 4.4–5.9)
SODIUM SERPL-SCNC: 140 MMOL/L (ref 136–145)
TROPONIN T SERPL HS-MCNC: 6 NG/L
WBC # BLD AUTO: 6.8 10E3/UL (ref 4–11)

## 2023-02-24 PROCEDURE — 93005 ELECTROCARDIOGRAM TRACING: CPT

## 2023-02-24 PROCEDURE — 84484 ASSAY OF TROPONIN QUANT: CPT | Performed by: EMERGENCY MEDICINE

## 2023-02-24 PROCEDURE — 36415 COLL VENOUS BLD VENIPUNCTURE: CPT | Performed by: EMERGENCY MEDICINE

## 2023-02-24 PROCEDURE — 85004 AUTOMATED DIFF WBC COUNT: CPT | Performed by: EMERGENCY MEDICINE

## 2023-02-24 PROCEDURE — 80053 COMPREHEN METABOLIC PANEL: CPT | Performed by: EMERGENCY MEDICINE

## 2023-02-24 PROCEDURE — 71046 X-RAY EXAM CHEST 2 VIEWS: CPT

## 2023-02-24 PROCEDURE — 85379 FIBRIN DEGRADATION QUANT: CPT | Performed by: EMERGENCY MEDICINE

## 2023-02-24 PROCEDURE — 99284 EMERGENCY DEPT VISIT MOD MDM: CPT | Mod: 25

## 2023-02-24 ASSESSMENT — ENCOUNTER SYMPTOMS
NAUSEA: 1
SHORTNESS OF BREATH: 0
ABDOMINAL PAIN: 0
DIZZINESS: 1
DIARRHEA: 0
COUGH: 0
FEVER: 0

## 2023-02-24 ASSESSMENT — ACTIVITIES OF DAILY LIVING (ADL)
ADLS_ACUITY_SCORE: 35
ADLS_ACUITY_SCORE: 35

## 2023-02-25 NOTE — ED TRIAGE NOTES
Chest pain for two hours.      Triage Assessment     Row Name 02/24/23 2473       Triage Assessment (Adult)    Airway WDL WDL       Respiratory WDL    Respiratory WDL WDL       Skin Circulation/Temperature WDL    Skin Circulation/Temperature WDL WDL       Cardiac WDL    Cardiac WDL X;chest pain       Peripheral/Neurovascular WDL    Peripheral Neurovascular WDL WDL       Cognitive/Neuro/Behavioral WDL    Cognitive/Neuro/Behavioral WDL WDL

## 2023-02-25 NOTE — ED PROVIDER NOTES
"  History     Chief Complaint:  Chest Pain       The history is provided by the patient.      Carlos A Connor is a 42 year old male who presents with chest pain. The patient reports that 2 hours ago while having one beer with friends, he began to feel nauseas, dizzy and developed chest pain. He endorses a slight headache as well. He denies cough, fever, shortness of breath, abdominal pain and diarrhea.     Independent Historian:   None - Patient Only    Review of External Notes: None    ROS:  Review of Systems   Constitutional: Negative for fever.   Respiratory: Negative for cough and shortness of breath.    Cardiovascular: Positive for chest pain.   Gastrointestinal: Positive for nausea. Negative for abdominal pain and diarrhea.   Neurological: Positive for dizziness.   All other systems reviewed and are negative.    Allergies:  Droperidol    Medications:    Paxil  Ativan    Past Medical History:    Anxiety  Migraines    Social History:  Patient unaccompanied to ED.  PCP: No Ref-Primary, Physician     Physical Exam     Patient Vitals for the past 24 hrs:   BP Temp Temp src Pulse Resp SpO2 Height Weight   02/24/23 1945 (!) 148/96 -- -- 85 21 97 % -- --   02/24/23 1930 (!) 153/101 -- -- 87 23 97 % -- --   02/24/23 1923 (!) 153/95 -- -- 86 21 97 % -- --   02/24/23 1903 -- -- -- 93 -- -- -- --   02/24/23 1859 (!) 171/96 97.5  F (36.4  C) Oral -- 18 98 % 1.803 m (5' 11\") 95.3 kg (210 lb)        Physical Exam  Nursing note and vitals reviewed.  Constitutional:  Oriented to person, place, and time. Cooperative.   HENT:   Nose:    Nose normal.   Mouth/Throat:   Facemask in place.   Eyes:    Conjunctivae normal and EOM are normal.      Pupils are equal, round, and reactive to light.   Neck:    Trachea normal.   Cardiovascular:  Normal rate, regular rhythm, normal heart sounds and normal pulses. No murmur heard.  Pulmonary/Chest:  Effort normal and breath sounds normal.   Abdominal:   Soft. Normal appearance and bowel " sounds are normal.      There is no tenderness.      There is no rebound and no CVA tenderness.   Musculoskeletal:  Extremities atraumatic x 4.   Lymphadenopathy:  No cervical adenopathy.   Neurological:   Alert and oriented to person, place, and time. Normal strength.      No cranial nerve deficit or sensory deficit. GCS eye subscore is 4. GCS verbal subscore is 5. GCS motor subscore is 6.   Skin:    Skin is intact. No rash noted.   Psychiatric:   Normal mood and affect.    Emergency Department Course   ECG  ECG taken at 1850, ECG read at 1918  Sinus rhythm. Left bundle branch block. Abnormal ECG.    Vent rate has increase by 31 BPM. Left bundle branch block is now present as compared to prior, dated 8/30/21.  Rate 94 bpm. ND interval 146 ms. QRS duration 146 ms. QT/QTc 434/542 ms. P-R-T axes 54 83 -42.     Imaging:  XR Chest 2 Views   Final Result   IMPRESSION:       No focal airspace disease. No pleural effusion or pneumothorax.      The cardiomediastinal silhouette is unremarkable.         Report per radiology    Laboratory:  Labs Ordered and Resulted from Time of ED Arrival to Time of ED Departure   COMPREHENSIVE METABOLIC PANEL - Abnormal       Result Value    Sodium 140      Potassium 3.6      Chloride 100      Carbon Dioxide (CO2) 27      Anion Gap 13      Urea Nitrogen 10.8      Creatinine 0.63 (*)     Calcium 9.4      Glucose 207 (*)     Alkaline Phosphatase 115      AST 17      ALT 38      Protein Total 7.3      Albumin 4.5      Bilirubin Total 0.6      GFR Estimate >90     TROPONIN T, HIGH SENSITIVITY - Normal    Troponin T, High Sensitivity 6     D DIMER QUANTITATIVE - Normal    D-Dimer Quantitative <0.27     CBC WITH PLATELETS AND DIFFERENTIAL    WBC Count 6.8      RBC Count 5.08      Hemoglobin 14.7      Hematocrit 45.0      MCV 89      MCH 28.9      MCHC 32.7      RDW 12.3      Platelet Count 231      % Neutrophils 44      % Lymphocytes 44      % Monocytes 8      % Eosinophils 3      % Basophils 1       % Immature Granulocytes 0      NRBCs per 100 WBC 0      Absolute Neutrophils 3.1      Absolute Lymphocytes 3.0      Absolute Monocytes 0.5      Absolute Eosinophils 0.2      Absolute Basophils 0.1      Absolute Immature Granulocytes 0.0      Absolute NRBCs 0.0          Procedures       Emergency Department Course & Assessments:       Interventions:  Medications - No data to display     Independent Interpretation (X-rays, CTs, rhythm strip):  I reviewed the chest x-ray, which is unremarkable and negative for pneumothorax      Social Determinants of Health affecting care:   None    Assessments:  1915 I spoke with patient and assessed symptoms.    Disposition:  The patient was discharged to home.     Impression & Plan      Medical Decision Making:  This is a 42-year-old male came in for further evaluation of chest pain, which started about 2 hours prior to arrival.  He also had some nausea and dizziness.  He feels better at this point other than the ongoing chest discomfort.  His EKG is abnormal today, and it is different than his prior EKG from a couple of years ago.  I proceeded with the above work-up here including the blood work and chest x-ray.  With a negative D-dimer, I feel that pulmonary embolism has been sufficiently ruled out.  I had intended on repeating his troponin after a couple of hours to trend the troponin.  However the patient indicated he could not stay any longer, as he had to  his daughter.  I explained to him that his EKG is abnormal today and that it is concerning how different it is.  He understands that this could be quite serious and resulted in significant morbidity and even mortality.  However he insists on leaving since he has to  his daughter.  I instructed him to follow-up with his physician as soon as possible and to return here if he changes his mind or if he develops any new or concerning symptoms as well.      Diagnosis:    ICD-10-CM    1. Chest pain, unspecified  type  R07.9       2. Abnormal electrocardiogram  R94.31            Discharge Medications:  Discharge Medication List as of 2/24/2023  8:59 PM             Scribe Disclosure:  I, Krystin Gonzalez, am serving as a scribe at 7:18 PM on 2/24/2023 to document services personally performed by Hoang Bravo MD based on my observations and the provider's statements to me.   2/24/2023   Hoang Bravo MD Lashkowitz, Seth H, MD  02/24/23 2120

## 2023-07-03 NOTE — PLAN OF CARE
Problem: Plan of Care - These are the overarching goals to be used throughout the patient stay.    Goal: Plan of Care Review/Shift Note  Description: The Plan of Care Review/Shift note should be completed every shift.  The Outcome Evaluation is a brief statement about your assessment that the patient is improving, declining, or no change.  This information will be displayed automatically on your shift note.  Outcome: Ongoing, Progressing     Problem: Infection  Goal: Absence of Infection Signs and Symptoms  Outcome: Ongoing, Progressing   Goal Outcome Evaluation:      Creatine improving today, fluid bolus given this am. Now maintenance is running. Up in room independently, denying pain. Receiving IV acyclovir still.                  PIV started by VAT per request.

## 2023-08-03 ENCOUNTER — HOSPITAL ENCOUNTER (EMERGENCY)
Facility: CLINIC | Age: 43
Discharge: HOME | End: 2023-08-04
Attending: EMERGENCY MEDICINE | Admitting: EMERGENCY MEDICINE
Payer: MEDICAID

## 2023-08-03 DIAGNOSIS — R42 DIZZINESS: ICD-10-CM

## 2023-08-03 DIAGNOSIS — I10 HYPERTENSION, UNSPECIFIED TYPE: ICD-10-CM

## 2023-08-03 LAB
ALBUMIN SERPL BCG-MCNC: 4.3 G/DL (ref 3.5–5.2)
ALP SERPL-CCNC: 108 U/L (ref 40–129)
ALT SERPL W P-5'-P-CCNC: 43 U/L (ref 0–70)
ANION GAP SERPL CALCULATED.3IONS-SCNC: 15 MMOL/L (ref 7–15)
AST SERPL W P-5'-P-CCNC: 19 U/L (ref 0–45)
ATRIAL RATE - MUSE: 81 BPM
BASOPHILS # BLD AUTO: 0.1 10E3/UL (ref 0–0.2)
BASOPHILS NFR BLD AUTO: 1 %
BILIRUB SERPL-MCNC: 0.5 MG/DL
BUN SERPL-MCNC: 13.3 MG/DL (ref 6–20)
CALCIUM SERPL-MCNC: 9.3 MG/DL (ref 8.6–10)
CHLORIDE SERPL-SCNC: 103 MMOL/L (ref 98–107)
CREAT SERPL-MCNC: 0.71 MG/DL (ref 0.67–1.17)
DEPRECATED HCO3 PLAS-SCNC: 22 MMOL/L (ref 22–29)
DIASTOLIC BLOOD PRESSURE - MUSE: NORMAL MMHG
EOSINOPHIL # BLD AUTO: 0.3 10E3/UL (ref 0–0.7)
EOSINOPHIL NFR BLD AUTO: 3 %
ERYTHROCYTE [DISTWIDTH] IN BLOOD BY AUTOMATED COUNT: 12.5 % (ref 10–15)
FLUAV RNA SPEC QL NAA+PROBE: NEGATIVE
FLUBV RNA RESP QL NAA+PROBE: NEGATIVE
GFR SERPL CREATININE-BSD FRML MDRD: >90 ML/MIN/1.73M2
GLUCOSE SERPL-MCNC: 132 MG/DL (ref 70–99)
HCT VFR BLD AUTO: 42.5 % (ref 40–53)
HGB BLD-MCNC: 14.2 G/DL (ref 13.3–17.7)
IMM GRANULOCYTES # BLD: 0 10E3/UL
IMM GRANULOCYTES NFR BLD: 0 %
INTERPRETATION ECG - MUSE: NORMAL
LYMPHOCYTES # BLD AUTO: 3.7 10E3/UL (ref 0.8–5.3)
LYMPHOCYTES NFR BLD AUTO: 42 %
MCH RBC QN AUTO: 28.7 PG (ref 26.5–33)
MCHC RBC AUTO-ENTMCNC: 33.4 G/DL (ref 31.5–36.5)
MCV RBC AUTO: 86 FL (ref 78–100)
MONOCYTES # BLD AUTO: 0.6 10E3/UL (ref 0–1.3)
MONOCYTES NFR BLD AUTO: 7 %
NEUTROPHILS # BLD AUTO: 4.1 10E3/UL (ref 1.6–8.3)
NEUTROPHILS NFR BLD AUTO: 47 %
NRBC # BLD AUTO: 0 10E3/UL
NRBC BLD AUTO-RTO: 0 /100
P AXIS - MUSE: 53 DEGREES
PLATELET # BLD AUTO: 219 10E3/UL (ref 150–450)
POTASSIUM SERPL-SCNC: 3.6 MMOL/L (ref 3.4–5.3)
PR INTERVAL - MUSE: 134 MS
PROT SERPL-MCNC: 6.9 G/DL (ref 6.4–8.3)
QRS DURATION - MUSE: 88 MS
QT - MUSE: 398 MS
QTC - MUSE: 462 MS
R AXIS - MUSE: 20 DEGREES
RBC # BLD AUTO: 4.94 10E6/UL (ref 4.4–5.9)
RSV RNA SPEC NAA+PROBE: NEGATIVE
SARS-COV-2 RNA RESP QL NAA+PROBE: NEGATIVE
SODIUM SERPL-SCNC: 140 MMOL/L (ref 136–145)
SYSTOLIC BLOOD PRESSURE - MUSE: NORMAL MMHG
T AXIS - MUSE: 18 DEGREES
VENTRICULAR RATE- MUSE: 81 BPM
WBC # BLD AUTO: 8.8 10E3/UL (ref 4–11)

## 2023-08-03 PROCEDURE — 250N000011 HC RX IP 250 OP 636: Mod: JZ | Performed by: EMERGENCY MEDICINE

## 2023-08-03 PROCEDURE — 250N000013 HC RX MED GY IP 250 OP 250 PS 637: Performed by: EMERGENCY MEDICINE

## 2023-08-03 PROCEDURE — 80053 COMPREHEN METABOLIC PANEL: CPT | Performed by: EMERGENCY MEDICINE

## 2023-08-03 PROCEDURE — 93005 ELECTROCARDIOGRAM TRACING: CPT

## 2023-08-03 PROCEDURE — 36415 COLL VENOUS BLD VENIPUNCTURE: CPT | Performed by: EMERGENCY MEDICINE

## 2023-08-03 PROCEDURE — 258N000003 HC RX IP 258 OP 636: Performed by: EMERGENCY MEDICINE

## 2023-08-03 PROCEDURE — 96361 HYDRATE IV INFUSION ADD-ON: CPT

## 2023-08-03 PROCEDURE — 99284 EMERGENCY DEPT VISIT MOD MDM: CPT | Mod: 25

## 2023-08-03 PROCEDURE — 85025 COMPLETE CBC W/AUTO DIFF WBC: CPT | Performed by: EMERGENCY MEDICINE

## 2023-08-03 PROCEDURE — 87637 SARSCOV2&INF A&B&RSV AMP PRB: CPT | Performed by: EMERGENCY MEDICINE

## 2023-08-03 PROCEDURE — 96374 THER/PROPH/DIAG INJ IV PUSH: CPT

## 2023-08-03 RX ORDER — ACETAMINOPHEN 500 MG
1000 TABLET ORAL ONCE
Status: COMPLETED | OUTPATIENT
Start: 2023-08-03 | End: 2023-08-03

## 2023-08-03 RX ORDER — METOCLOPRAMIDE HYDROCHLORIDE 5 MG/ML
10 INJECTION INTRAMUSCULAR; INTRAVENOUS ONCE
Status: COMPLETED | OUTPATIENT
Start: 2023-08-03 | End: 2023-08-03

## 2023-08-03 RX ADMIN — SODIUM CHLORIDE, POTASSIUM CHLORIDE, SODIUM LACTATE AND CALCIUM CHLORIDE 1000 ML: 600; 310; 30; 20 INJECTION, SOLUTION INTRAVENOUS at 22:48

## 2023-08-03 RX ADMIN — METOCLOPRAMIDE 10 MG: 5 INJECTION, SOLUTION INTRAMUSCULAR; INTRAVENOUS at 22:48

## 2023-08-03 RX ADMIN — ACETAMINOPHEN 1000 MG: 500 TABLET, FILM COATED ORAL at 22:58

## 2023-08-03 ASSESSMENT — ACTIVITIES OF DAILY LIVING (ADL)
ADLS_ACUITY_SCORE: 35
ADLS_ACUITY_SCORE: 35

## 2023-08-04 VITALS
BODY MASS INDEX: 29.4 KG/M2 | TEMPERATURE: 98 F | SYSTOLIC BLOOD PRESSURE: 143 MMHG | HEIGHT: 71 IN | WEIGHT: 210 LBS | RESPIRATION RATE: 18 BRPM | DIASTOLIC BLOOD PRESSURE: 92 MMHG | OXYGEN SATURATION: 95 % | HEART RATE: 80 BPM

## 2023-08-04 ASSESSMENT — ACTIVITIES OF DAILY LIVING (ADL): ADLS_ACUITY_SCORE: 35

## 2023-08-04 NOTE — DISCHARGE INSTRUCTIONS
Please resume your medications.  Check your blood pressure and keep a log when you are able.  Please call your doctor in the morning regarding follow up and continuing your medications.  Return to the ER for worsening symptoms or further concerns.

## 2023-08-04 NOTE — ED PROVIDER NOTES
"  History     Chief Complaint:  Dizziness       HPI   Carlos A Connor is a 42 year old male with a history of hypertension presenting for lightheadedness and bilateral blurry vision. Patient states that this morning when he woke up his vision was blurry and he had a bad headache. When he stood up he felt light headed. Patient's symptoms have been constant since he woke up. Patient did not take any medication for his headache. Patient states he felt fine yesterday. Patient reports that this has happened to him before a couple of times. The last episode was about 4-5 months ago. Patient has seen his doctor about this and was given blood pressure medication (Lisinopril). Patient reports that he was feeling good so he stopped taking his medication, but took it this morning because he was feeling unwell. The last time he took the medication before today was about 3 months ago. Patient denies any recent sickness. Patient takes lorazepam daily for anxiety. Patient has no other medical problems.    Independent Historian:   None - Patient Only    Review of External Notes:   None    Medications:    Lorazepam   Paxil   Lisinopril     Past Medical History:    Anxiety   Migraines  Palpitations   Hx of alcohol abuse  Pericarditis   Cocaine abuse   Methamphetamine abuse   Meningitis due to herpes zoster virus   Acute kidney failure    Hypertension     Physical Exam   Patient Vitals for the past 24 hrs:   BP Temp Temp src Pulse Resp SpO2 Height Weight   08/04/23 0028 (!) 143/92 98  F (36.7  C) Oral -- 18 95 % -- --   08/04/23 0000 (!) 138/90 -- -- 80 -- 94 % -- --   08/03/23 2330 (!) 136/99 -- -- 84 -- 97 % -- --   08/03/23 2300 (!) 152/100 -- -- 82 -- 98 % -- --   08/03/23 2055 (!) 139/100 97.1  F (36.2  C) Temporal 82 18 97 % 1.803 m (5' 11\") 95.3 kg (210 lb)        Physical Exam  General: Appears well-developed and well-nourished.   Head: No signs of trauma.   Mouth/Throat: Oropharynx is clear and moist.   Eyes: Conjunctivae " are normal. Pupils are equal, round, and reactive to light.   Neck: Normal range of motion. No nuchal rigidity. No cervical adenopathy  CV: Normal rate and regular rhythm.    Resp: Effort normal and breath sounds normal. No respiratory distress.   GI: Soft. There is no tenderness.  No rebound or guarding.  Normal bowel sounds.    MSK: Normal range of motion. no edema. No Calf tenderness.  Neuro: The patient is alert and oriented to person, place, and time.  PERRLA, EOMI, strength in upper/lower extremities normal and symmetrical. Sensation normal. Speech normal.  Skin: Skin is warm and dry. No rash noted.   Psych: normal mood and affect. behavior is normal.       Emergency Department Course   ECG  ECG taken at 2056, ECG read at 2110  Normal sinus rhythm  T wave abnormality   Anterolateral ischemia   Abnormal ECG   LBBB is no longer present as compared to prior, dated 2/24/23  Rate 81 bpm. AL interval 134 ms. QRS duration 88 ms. QT/QTc 398/462 ms. P-R-T axes 53 20 18.     Laboratory:  Labs Ordered and Resulted from Time of ED Arrival to Time of ED Departure   COMPREHENSIVE METABOLIC PANEL - Abnormal       Result Value    Sodium 140      Potassium 3.6      Chloride 103      Carbon Dioxide (CO2) 22      Anion Gap 15      Urea Nitrogen 13.3      Creatinine 0.71      Calcium 9.3      Glucose 132 (*)     Alkaline Phosphatase 108      AST 19      ALT 43      Protein Total 6.9      Albumin 4.3      Bilirubin Total 0.5      GFR Estimate >90     INFLUENZA A/B, RSV, & SARS-COV2 PCR - Normal    Influenza A PCR Negative      Influenza B PCR Negative      RSV PCR Negative      SARS CoV2 PCR Negative     CBC WITH PLATELETS AND DIFFERENTIAL    WBC Count 8.8      RBC Count 4.94      Hemoglobin 14.2      Hematocrit 42.5      MCV 86      MCH 28.7      MCHC 33.4      RDW 12.5      Platelet Count 219      % Neutrophils 47      % Lymphocytes 42      % Monocytes 7      % Eosinophils 3      % Basophils 1      % Immature Granulocytes 0       NRBCs per 100 WBC 0      Absolute Neutrophils 4.1      Absolute Lymphocytes 3.7      Absolute Monocytes 0.6      Absolute Eosinophils 0.3      Absolute Basophils 0.1      Absolute Immature Granulocytes 0.0      Absolute NRBCs 0.0          Emergency Department Course & Assessments:  Interventions:  Medications   acetaminophen (TYLENOL) tablet 1,000 mg (1,000 mg Oral $Given 8/3/23 2258)   lactated ringers BOLUS 1,000 mL (0 mLs Intravenous Stopped 8/4/23 0028)   metoclopramide (REGLAN) injection 10 mg (10 mg Intravenous $Given 8/3/23 2248)      Independent Interpretation (X-rays, CTs, rhythm strip):  None    Assessments/Consultations/Discussion of Management or Tests:   ED Course as of 08/04/23 0603   Thu Aug 03, 2023   2211 I examined the patient and obtained history as noted above.     2337 I rechecked and updated the patient.     Fri Aug 04, 2023   0037 I rechecked and updated the patient.       Social Determinants of Health affecting care:   None    Disposition:  The patient was discharged to home.     Impression & Plan      Medical Decision Making:  Carlos A Connor presents due to headache and dizziness feeling.  States that the symptoms started when he woke up today.  States has had this happen to her on a few occasions in the past.  Patient reports that he had stopped taking his blood pressure medications a few months ago.  When he was having some head pressure today he decided to take his lisinopril this afternoon.  On evaluation he is fully neurologically intact.  His blood pressure was somewhat elevated on arrival, but it did improve without further interventions.  EKG and blood work was obtained that was reassuring.  I considered a CT scan of the head, but given his reassuring neurologic exam and the fact that he had the symptoms on a few occasions in the past, I did not feel that it was indicated.  He was given the above medications and improvement of his symptoms and he was able to ambulate without  difficulty.  He was discharged home and I did recommend that he resume taking his blood pressure medications and that he follow-up with his doctor in clinic.  Return instructions were discussed.      Diagnosis:    ICD-10-CM    1. Hypertension, unspecified type  I10       2. Dizziness  R42            Discharge Medications:  Discharge Medication List as of 8/4/2023  1:08 AM           Scribe Disclosure:  I, Sulma Silverman, am serving as a scribe at 11:10 PM on 8/3/2023 to document services personally performed by Ceasar Burrell MD based on my observations and the provider's statements to me.     8/3/2023   Ceasar Burrell MD Bergenstal, John A, MD  08/04/23 0605

## 2023-08-10 ENCOUNTER — HOSPITAL ENCOUNTER (EMERGENCY)
Facility: CLINIC | Age: 43
Discharge: HOME | End: 2023-08-10
Attending: EMERGENCY MEDICINE | Admitting: EMERGENCY MEDICINE
Payer: MEDICAID

## 2023-08-10 VITALS
WEIGHT: 205 LBS | RESPIRATION RATE: 19 BRPM | BODY MASS INDEX: 28.7 KG/M2 | SYSTOLIC BLOOD PRESSURE: 141 MMHG | DIASTOLIC BLOOD PRESSURE: 87 MMHG | HEART RATE: 65 BPM | HEIGHT: 71 IN | TEMPERATURE: 97.5 F | OXYGEN SATURATION: 98 %

## 2023-08-10 DIAGNOSIS — R51.9 ACUTE NONINTRACTABLE HEADACHE, UNSPECIFIED HEADACHE TYPE: ICD-10-CM

## 2023-08-10 DIAGNOSIS — R07.89 ATYPICAL CHEST PAIN: ICD-10-CM

## 2023-08-10 LAB
ALBUMIN SERPL BCG-MCNC: 4.8 G/DL (ref 3.5–5.2)
ALP SERPL-CCNC: 106 U/L (ref 40–129)
ALT SERPL W P-5'-P-CCNC: 46 U/L (ref 0–70)
ANION GAP SERPL CALCULATED.3IONS-SCNC: 14 MMOL/L (ref 7–15)
AST SERPL W P-5'-P-CCNC: 21 U/L (ref 0–45)
ATRIAL RATE - MUSE: 76 BPM
BASOPHILS # BLD AUTO: 0.1 10E3/UL (ref 0–0.2)
BASOPHILS NFR BLD AUTO: 1 %
BILIRUB SERPL-MCNC: 0.5 MG/DL
BUN SERPL-MCNC: 14.9 MG/DL (ref 6–20)
CALCIUM SERPL-MCNC: 9.5 MG/DL (ref 8.6–10)
CHLORIDE SERPL-SCNC: 102 MMOL/L (ref 98–107)
CREAT SERPL-MCNC: 0.69 MG/DL (ref 0.67–1.17)
DEPRECATED HCO3 PLAS-SCNC: 24 MMOL/L (ref 22–29)
DIASTOLIC BLOOD PRESSURE - MUSE: NORMAL MMHG
EOSINOPHIL # BLD AUTO: 0.2 10E3/UL (ref 0–0.7)
EOSINOPHIL NFR BLD AUTO: 3 %
ERYTHROCYTE [DISTWIDTH] IN BLOOD BY AUTOMATED COUNT: 12.7 % (ref 10–15)
GFR SERPL CREATININE-BSD FRML MDRD: >90 ML/MIN/1.73M2
GLUCOSE SERPL-MCNC: 114 MG/DL (ref 70–99)
HCT VFR BLD AUTO: 44.9 % (ref 40–53)
HGB BLD-MCNC: 15 G/DL (ref 13.3–17.7)
HOLD SPECIMEN: NORMAL
IMM GRANULOCYTES # BLD: 0 10E3/UL
IMM GRANULOCYTES NFR BLD: 0 %
INTERPRETATION ECG - MUSE: NORMAL
LYMPHOCYTES # BLD AUTO: 3.4 10E3/UL (ref 0.8–5.3)
LYMPHOCYTES NFR BLD AUTO: 42 %
MCH RBC QN AUTO: 28.6 PG (ref 26.5–33)
MCHC RBC AUTO-ENTMCNC: 33.4 G/DL (ref 31.5–36.5)
MCV RBC AUTO: 86 FL (ref 78–100)
MONOCYTES # BLD AUTO: 0.5 10E3/UL (ref 0–1.3)
MONOCYTES NFR BLD AUTO: 7 %
NEUTROPHILS # BLD AUTO: 3.9 10E3/UL (ref 1.6–8.3)
NEUTROPHILS NFR BLD AUTO: 47 %
NRBC # BLD AUTO: 0 10E3/UL
NRBC BLD AUTO-RTO: 0 /100
P AXIS - MUSE: 41 DEGREES
PLATELET # BLD AUTO: 231 10E3/UL (ref 150–450)
POTASSIUM SERPL-SCNC: 3.8 MMOL/L (ref 3.4–5.3)
PR INTERVAL - MUSE: 136 MS
PROT SERPL-MCNC: 7.5 G/DL (ref 6.4–8.3)
QRS DURATION - MUSE: 88 MS
QT - MUSE: 392 MS
QTC - MUSE: 441 MS
R AXIS - MUSE: 11 DEGREES
RBC # BLD AUTO: 5.25 10E6/UL (ref 4.4–5.9)
SODIUM SERPL-SCNC: 140 MMOL/L (ref 136–145)
SYSTOLIC BLOOD PRESSURE - MUSE: NORMAL MMHG
T AXIS - MUSE: 6 DEGREES
TROPONIN T SERPL HS-MCNC: <6 NG/L
VENTRICULAR RATE- MUSE: 76 BPM
WBC # BLD AUTO: 8 10E3/UL (ref 4–11)

## 2023-08-10 PROCEDURE — 85025 COMPLETE CBC W/AUTO DIFF WBC: CPT | Performed by: EMERGENCY MEDICINE

## 2023-08-10 PROCEDURE — 258N000003 HC RX IP 258 OP 636: Performed by: EMERGENCY MEDICINE

## 2023-08-10 PROCEDURE — 36415 COLL VENOUS BLD VENIPUNCTURE: CPT | Performed by: EMERGENCY MEDICINE

## 2023-08-10 PROCEDURE — 250N000011 HC RX IP 250 OP 636: Mod: JZ | Performed by: EMERGENCY MEDICINE

## 2023-08-10 PROCEDURE — 84484 ASSAY OF TROPONIN QUANT: CPT | Performed by: EMERGENCY MEDICINE

## 2023-08-10 PROCEDURE — 99284 EMERGENCY DEPT VISIT MOD MDM: CPT | Mod: 25

## 2023-08-10 PROCEDURE — 93005 ELECTROCARDIOGRAM TRACING: CPT

## 2023-08-10 PROCEDURE — 80053 COMPREHEN METABOLIC PANEL: CPT | Performed by: EMERGENCY MEDICINE

## 2023-08-10 PROCEDURE — 96374 THER/PROPH/DIAG INJ IV PUSH: CPT | Mod: 59

## 2023-08-10 PROCEDURE — 96361 HYDRATE IV INFUSION ADD-ON: CPT

## 2023-08-10 RX ORDER — KETOROLAC TROMETHAMINE 15 MG/ML
15 INJECTION, SOLUTION INTRAMUSCULAR; INTRAVENOUS ONCE
Status: COMPLETED | OUTPATIENT
Start: 2023-08-10 | End: 2023-08-10

## 2023-08-10 RX ORDER — KETOROLAC TROMETHAMINE 10 MG/1
10 TABLET, FILM COATED ORAL EVERY 6 HOURS PRN
Qty: 12 TABLET | Refills: 0 | Status: SHIPPED | OUTPATIENT
Start: 2023-08-10

## 2023-08-10 RX ORDER — KETOROLAC TROMETHAMINE 10 MG/1
10 TABLET, FILM COATED ORAL EVERY 6 HOURS PRN
Qty: 12 TABLET | Refills: 0 | Status: SHIPPED | OUTPATIENT
Start: 2023-08-10 | End: 2023-08-10

## 2023-08-10 RX ADMIN — SODIUM CHLORIDE 1000 ML: 9 INJECTION, SOLUTION INTRAVENOUS at 20:29

## 2023-08-10 RX ADMIN — KETOROLAC TROMETHAMINE 15 MG: 15 INJECTION, SOLUTION INTRAMUSCULAR; INTRAVENOUS at 20:29

## 2023-08-10 ASSESSMENT — ACTIVITIES OF DAILY LIVING (ADL): ADLS_ACUITY_SCORE: 35

## 2023-08-11 NOTE — ED PROVIDER NOTES
"  History     Chief Complaint:  Chest Pain (Sharp CP since this am with concurrent lightheadedness and headache. + pain rad. To upper back. )       The history is provided by the patient.      Carlos A Connor is a 42 year old male who presents with headache, blurred vision, and chest pain.  He reports he has had a headache since this morning with associated blurred vision.  He also developed nonpleuritic chest pain this morning that has now resolved.  He last had chest pain 2 hours ago.  He checked his blood pressure and found it to be 180/110.  He notes he recently started lisinopril.  He denies fever, cough, and shortness of breath.      Independent Historian:   None - Patient Only    Review of External Notes:   None      Medications:    Lorazepam   Paxil   Lisinopril     Past Medical History:    Anxiety   Migraines  Palpitations   Alcohol abuse  Pericarditis   Cocaine abuse   Methamphetamine abuse   Meningitis due to herpes zoster virus   Acute kidney failure    Hypertension   Gastroenteritis     Physical Exam   Patient Vitals for the past 24 hrs:   BP Temp Temp src Pulse Resp SpO2 Height Weight   08/10/23 2021 -- -- -- 73 14 -- -- --   08/10/23 1940 (!) 161/95 97.5  F (36.4  C) Temporal 80 16 96 % 1.803 m (5' 11\") 93 kg (205 lb)        Physical Exam    Constitutional: Vital signs reviewed as above  General: Alert, pleasant  HEENT: Moist mucous membranes  Eyes: Pupils are equal, round, and reactive to light.   Neck: Normal range of motion  Cardiovascular: normal rate, Regular rhythm and normal heart sounds.  No MRG.  No chest wall tenderness.  Pulmonary/Chest: Effort normal and breath sounds normal. No respiratory distress. Patient has no wheezes. Patient has no rales.   Gastrointestinal: Soft. Positive bowel sounds. No MRG.  Musculoskeletal/Extremities: Full ROM.  No calf swelling or tenderness.  Endo: No pitting edema  Neurological: Alert, no focal deficits.  Skin: Skin is warm and dry.   Psychiatric: " Pleasant     Emergency Department Course   ECG  ECG taken at 1942, ECG read at 2100  Normal sinus rhythm.  Minimal voltage criteria for LVH, may be normal variant ( R in aVL ).  Nonspecific T wave abnormality.  Abnormal ECG   No significant change as compared to prior, dated 8/3/23.  Rate 76 bpm. WA interval 136 ms. QRS duration 88 ms. QT/QTc 392/441 ms. P-R-T axes 41 11 6.     Laboratory:  Labs Ordered and Resulted from Time of ED Arrival to Time of ED Departure   COMPREHENSIVE METABOLIC PANEL - Abnormal       Result Value    Sodium 140      Potassium 3.8      Chloride 102      Carbon Dioxide (CO2) 24      Anion Gap 14      Urea Nitrogen 14.9      Creatinine 0.69      Calcium 9.5      Glucose 114 (*)     Alkaline Phosphatase 106      AST 21      ALT 46      Protein Total 7.5      Albumin 4.8      Bilirubin Total 0.5      GFR Estimate >90     TROPONIN T, HIGH SENSITIVITY - Normal    Troponin T, High Sensitivity <6     CBC WITH PLATELETS AND DIFFERENTIAL    WBC Count 8.0      RBC Count 5.25      Hemoglobin 15.0      Hematocrit 44.9      MCV 86      MCH 28.6      MCHC 33.4      RDW 12.7      Platelet Count 231      % Neutrophils 47      % Lymphocytes 42      % Monocytes 7      % Eosinophils 3      % Basophils 1      % Immature Granulocytes 0      NRBCs per 100 WBC 0      Absolute Neutrophils 3.9      Absolute Lymphocytes 3.4      Absolute Monocytes 0.5      Absolute Eosinophils 0.2      Absolute Basophils 0.1      Absolute Immature Granulocytes 0.0      Absolute NRBCs 0.0          Emergency Department Course & Assessments:    Interventions:  Medications   ketorolac (TORADOL) injection 15 mg (15 mg Intravenous $Given 8/10/23 2029)   0.9% sodium chloride BOLUS (1,000 mLs Intravenous $New Bag 8/10/23 2029)        Assessments:  2020 Initial assessment. I gathered history and examined the patient as noted above.   2125 I rechecked the patient and explained findings.  He feels well and blood pressure has improved.    Social  Determinants of Health affecting care:   None    Disposition:  The patient was discharged to home.     Impression & Plan      Medical Decision Making:  Patient presents with 2 complaints.  The first is chest discomfort as detailed above.  This started after he started having a bit of a headache with some blurred vision and dizziness earlier today.  He does get headaches from time to time.  He took some Tylenol with no relief.  The chest pain was intermittent and went off and on for couple of hours.  No radiation of the pain.  No shortness of breath.  The pain was not sharp or stabbing.  No pain with inspiration.  His EKG here shows sinus rhythm without signs of ischemia.  His troponin was undetectable at less than 6.  Coupled that with his low risk heart score and we have ruled out acute coronary syndrome.  Additionally he is PERC negative making PE unlikely.  I suspect his blood pressure is up somewhat because of the discomfort he was having earlier.  He was given a dose of Toradol and fluids and his blood pressure went from the 150s to the 120s over 70s.  He was feeling completely back to normal.  It should be mentioned that there is no focal deficits on his exam and I do not think this represented a stroke.  I think he has a viral syndrome which is causing all the other symptoms.  He will follow-up with his primary care doctor early next week.  I will give him some Toradol for home as well.      Diagnosis:    ICD-10-CM    1. Atypical chest pain  R07.89       2. Acute nonintractable headache, unspecified headache type  R51.9            Discharge Medications:  New Prescriptions    KETOROLAC (TORADOL) 10 MG TABLET    Take 1 tablet (10 mg) by mouth every 6 hours as needed for moderate pain          Scribe Disclosure:  ONDINA, Esperanza Cobos, am serving as a scribe at 8:16 PM on 8/10/2023 to document services personally performed by Isael Lim MD based on my observations and the provider's statements to me.      8/10/2023   Isael Lim MD Walters, Brent Aaron, MD  08/10/23 2891

## 2024-01-15 ENCOUNTER — HOSPITAL ENCOUNTER (EMERGENCY)
Facility: CLINIC | Age: 44
Discharge: HOME | End: 2024-01-15
Attending: STUDENT IN AN ORGANIZED HEALTH CARE EDUCATION/TRAINING PROGRAM | Admitting: STUDENT IN AN ORGANIZED HEALTH CARE EDUCATION/TRAINING PROGRAM
Payer: MEDICAID

## 2024-01-15 ENCOUNTER — APPOINTMENT (OUTPATIENT)
Dept: GENERAL RADIOLOGY | Facility: CLINIC | Age: 44
End: 2024-01-15
Attending: STUDENT IN AN ORGANIZED HEALTH CARE EDUCATION/TRAINING PROGRAM
Payer: MEDICAID

## 2024-01-15 VITALS
DIASTOLIC BLOOD PRESSURE: 93 MMHG | HEIGHT: 71 IN | SYSTOLIC BLOOD PRESSURE: 151 MMHG | BODY MASS INDEX: 29.4 KG/M2 | HEART RATE: 64 BPM | OXYGEN SATURATION: 94 % | RESPIRATION RATE: 20 BRPM | TEMPERATURE: 97.7 F | WEIGHT: 210 LBS

## 2024-01-15 DIAGNOSIS — I10 HYPERTENSION, UNSPECIFIED TYPE: ICD-10-CM

## 2024-01-15 DIAGNOSIS — R07.9 CHEST PAIN, UNSPECIFIED TYPE: ICD-10-CM

## 2024-01-15 LAB
ANION GAP SERPL CALCULATED.3IONS-SCNC: 13 MMOL/L (ref 7–15)
ATRIAL RATE - MUSE: 74 BPM
BASOPHILS # BLD AUTO: 0 10E3/UL (ref 0–0.2)
BASOPHILS NFR BLD AUTO: 0 %
BUN SERPL-MCNC: 14.1 MG/DL (ref 6–20)
CALCIUM SERPL-MCNC: 9.5 MG/DL (ref 8.6–10)
CHLORIDE SERPL-SCNC: 102 MMOL/L (ref 98–107)
CREAT SERPL-MCNC: 0.63 MG/DL (ref 0.67–1.17)
DEPRECATED HCO3 PLAS-SCNC: 25 MMOL/L (ref 22–29)
DIASTOLIC BLOOD PRESSURE - MUSE: NORMAL MMHG
EGFRCR SERPLBLD CKD-EPI 2021: >90 ML/MIN/1.73M2
EOSINOPHIL # BLD AUTO: 0.2 10E3/UL (ref 0–0.7)
EOSINOPHIL NFR BLD AUTO: 3 %
ERYTHROCYTE [DISTWIDTH] IN BLOOD BY AUTOMATED COUNT: 12.2 % (ref 10–15)
GLUCOSE SERPL-MCNC: 132 MG/DL (ref 70–99)
HCT VFR BLD AUTO: 47.6 % (ref 40–53)
HGB BLD-MCNC: 16.4 G/DL (ref 13.3–17.7)
IMM GRANULOCYTES # BLD: 0 10E3/UL
IMM GRANULOCYTES NFR BLD: 0 %
INTERPRETATION ECG - MUSE: NORMAL
LYMPHOCYTES # BLD AUTO: 2.9 10E3/UL (ref 0.8–5.3)
LYMPHOCYTES NFR BLD AUTO: 43 %
MCH RBC QN AUTO: 29.5 PG (ref 26.5–33)
MCHC RBC AUTO-ENTMCNC: 34.5 G/DL (ref 31.5–36.5)
MCV RBC AUTO: 86 FL (ref 78–100)
MONOCYTES # BLD AUTO: 0.4 10E3/UL (ref 0–1.3)
MONOCYTES NFR BLD AUTO: 6 %
NEUTROPHILS # BLD AUTO: 3.2 10E3/UL (ref 1.6–8.3)
NEUTROPHILS NFR BLD AUTO: 48 %
NRBC # BLD AUTO: 0 10E3/UL
NRBC BLD AUTO-RTO: 0 /100
P AXIS - MUSE: 55 DEGREES
PLATELET # BLD AUTO: 255 10E3/UL (ref 150–450)
POTASSIUM SERPL-SCNC: 4 MMOL/L (ref 3.4–5.3)
PR INTERVAL - MUSE: 138 MS
QRS DURATION - MUSE: 86 MS
QT - MUSE: 350 MS
QTC - MUSE: 388 MS
R AXIS - MUSE: 19 DEGREES
RBC # BLD AUTO: 5.55 10E6/UL (ref 4.4–5.9)
SODIUM SERPL-SCNC: 140 MMOL/L (ref 135–145)
SYSTOLIC BLOOD PRESSURE - MUSE: NORMAL MMHG
T AXIS - MUSE: -33 DEGREES
TROPONIN T SERPL HS-MCNC: 7 NG/L
TROPONIN T SERPL HS-MCNC: <6 NG/L
VENTRICULAR RATE- MUSE: 74 BPM
WBC # BLD AUTO: 6.8 10E3/UL (ref 4–11)

## 2024-01-15 PROCEDURE — 258N000003 HC RX IP 258 OP 636: Performed by: STUDENT IN AN ORGANIZED HEALTH CARE EDUCATION/TRAINING PROGRAM

## 2024-01-15 PROCEDURE — 85004 AUTOMATED DIFF WBC COUNT: CPT | Performed by: STUDENT IN AN ORGANIZED HEALTH CARE EDUCATION/TRAINING PROGRAM

## 2024-01-15 PROCEDURE — 36415 COLL VENOUS BLD VENIPUNCTURE: CPT | Performed by: STUDENT IN AN ORGANIZED HEALTH CARE EDUCATION/TRAINING PROGRAM

## 2024-01-15 PROCEDURE — 84484 ASSAY OF TROPONIN QUANT: CPT | Performed by: STUDENT IN AN ORGANIZED HEALTH CARE EDUCATION/TRAINING PROGRAM

## 2024-01-15 PROCEDURE — 93005 ELECTROCARDIOGRAM TRACING: CPT

## 2024-01-15 PROCEDURE — 71046 X-RAY EXAM CHEST 2 VIEWS: CPT

## 2024-01-15 PROCEDURE — 80048 BASIC METABOLIC PNL TOTAL CA: CPT | Performed by: STUDENT IN AN ORGANIZED HEALTH CARE EDUCATION/TRAINING PROGRAM

## 2024-01-15 PROCEDURE — 99285 EMERGENCY DEPT VISIT HI MDM: CPT | Mod: 25

## 2024-01-15 RX ADMIN — SODIUM CHLORIDE 1000 ML: 9 INJECTION, SOLUTION INTRAVENOUS at 19:28

## 2024-01-15 ASSESSMENT — ACTIVITIES OF DAILY LIVING (ADL)
ADLS_ACUITY_SCORE: 35

## 2024-01-15 NOTE — ED PROVIDER NOTES
"  History     Chief Complaint:  Hypertension       The history is provided by the patient.      Carlos A Connor is a 43 year old male with history of type 2 diabetes mellitus who presents to the ED with hypertension. Patient comes to ED with concern of high blood pressure. His blood pressure in the ED is 173/104. His chest pain started about 40 minutes after taking his prescribed medication. He describes the pain as pinching and a few different spots in the left side of his chest. Additionally, patient left arm prior to chest pain symptoms had felt \"asleep\". Of note, patient in ED has some dizziness. Denies shortness of breath, vomiting, sweating, weakness, abdominal pain, leg swelling, leg pain, and hemoptysis.  No radiation of chest pain.  No hormone use.  No history of DVT or PE.  No history of MI.     Independent Historian:    None - Patient Only    Review of External Notes:  none    Allergies:  Droperidol    Physical Exam   Patient Vitals for the past 24 hrs:   BP Temp Temp src Pulse Resp SpO2 Height Weight   01/15/24 2000 (!) 151/93 -- -- 64 -- 94 % -- --   01/15/24 1954 -- -- -- -- -- 95 % -- --   01/15/24 1947 -- -- -- -- -- 96 % -- --   01/15/24 1946 -- -- -- -- -- 97 % -- --   01/15/24 1945 (!) 157/91 -- -- 61 -- -- -- --   01/15/24 1433 (!) 173/104 97.7  F (36.5  C) Oral 74 20 97 % 1.803 m (5' 11\") 95.3 kg (210 lb)   01/15/24 1432 -- -- -- -- -- -- -- 95.3 kg (210 lb)        Physical Exam  GENERAL: Patient well-appearing  HEAD: Atraumatic.  NECK: No rigidity  CV: RRR, no murmurs rubs or gallops  PULM: CTAB with good aeration; no retractions, rales, rhonchi, or wheezing  ABD: Soft, nontender, nondistended, no guarding  DERM: No rash. Skin warm and dry  EXTREMITY: Moving all extremities without difficulty. No calf tenderness or peripheral edema  VASCULAR: Symmetric pulses bilaterally    Emergency Department Course   ECG  ECG taken at 1422, ECG read at 1425  Normal sinus rhythm   Upsloping ST segments " and J-point notching in V3 and V4 appear unchanged from prior ECG.  Has T wave inversions in the lateral leads which is also unchanged from ECG on August 3, 2023.  Rate 74 bpm. MD interval 138 ms. QRS duration 86 ms. QT/QTc 350/388 ms. P-R-T axes 55 19 -33.       Laboratory: Imaging:   Labs Ordered and Resulted from Time of ED Arrival to Time of ED Departure   BASIC METABOLIC PANEL - Abnormal       Result Value    Sodium 140      Potassium 4.0      Chloride 102      Carbon Dioxide (CO2) 25      Anion Gap 13      Urea Nitrogen 14.1      Creatinine 0.63 (*)     GFR Estimate >90      Calcium 9.5      Glucose 132 (*)    TROPONIN T, HIGH SENSITIVITY - Normal    Troponin T, High Sensitivity 7     TROPONIN T, HIGH SENSITIVITY - Normal    Troponin T, High Sensitivity <6     CBC WITH PLATELETS AND DIFFERENTIAL    WBC Count 6.8      RBC Count 5.55      Hemoglobin 16.4      Hematocrit 47.6      MCV 86      MCH 29.5      MCHC 34.5      RDW 12.2      Platelet Count 255      % Neutrophils 48      % Lymphocytes 43      % Monocytes 6      % Eosinophils 3      % Basophils 0      % Immature Granulocytes 0      NRBCs per 100 WBC 0      Absolute Neutrophils 3.2      Absolute Lymphocytes 2.9      Absolute Monocytes 0.4      Absolute Eosinophils 0.2      Absolute Basophils 0.0      Absolute Immature Granulocytes 0.0      Absolute NRBCs 0.0       XR Chest 2 Views   Final Result   IMPRESSION: No acute cardiopulmonary abnormality.      JOHNSON RIDDLE MD            SYSTEM ID:  [id number]              Emergency Department Course & Assessments:     Interventions:  Medications   sodium chloride 0.9% BOLUS 1,000 mL (0 mLs Intravenous Stopped 1/15/24 2004)        Assessments, Independent Interpretation, Consult/Discussion of ManagementTests:  ED Course as of 01/15/24 2052   [place] 15, 2024   7896 I obtained the history and examined the patient as noted above.   1503 I reviewed the patient's chest x-ray and it appears unremarkable.       Social  Determinants of Health affecting care:  None    Disposition:  The patient was discharged to home.     Impression & Plan    CMS Diagnoses: None    Code Status: Prior    Medical Decision Making:  Patient with nonspecific chest pain and elevated blood pressure.    Chronic conditions complicating - hypertension.    Differential diagnosis considered but not limited to ACS, PE, vascular dissection, but presentation not consistent with these etiologies.    Vital signs given for hypertension that improved without intervention.  Exam unremarkable.      ECG independently interpreted -showing upsloping ST segments in J-point notching and T wave inversions in the lateral leads that appears unchanged from prior ECG.  Do not think this is acute ischemia.    Chest x-ray independently interpreted unremarkable.      Labs including CBC, BMP, troponin unremarkable.  Repeat troponin negative.  History not consistent with ACS.  Do not think he requires ACS restratification.  I discussed this with patient and he is content with following up with his doctor for further outpatient workup.    Heart score low risk.  Low risk by Wells, not consistent with PE.    Do not think this is hypertensive emergency.  Patient felt improved on repeat assessments after some IV fluids.  Could be a medication side effect.  He states he does not take his blood pressure medications regularly and he took it today for the first time in a while.    I discussed exercise, weight loss, and lifestyle modifications.    I have evaluated the patient for acute medical emergencies and have clinically decided no further acute medical interventions are required. Reassessed multiple times and well appearing. Patient stable for discharge. All questions answered. Given strict return precautions. Patient content with plan. The differential diagnosis and treatment modalities were discussed thoroughly with the patient. Recommended PCP follow-up in 2-3 days.        Diagnosis:     ICD-10-CM    1. Chest pain, unspecified type  R07.9       2. Hypertension, unspecified type  I10            Discharge Medications:  Discharge Medication List as of 1/15/2024  8:15 PM         Scribe Disclosure:  I, Cruz Johnson, am serving as a scribe at 2:41 PM on 1/15/2024 to document services personally performed by Mitch Orozco MD based on my observations and the provider's statements to me.    1/15/2024   Mitch Orozco MD Foss, Kevin, MD  01/15/24 2056

## 2024-01-15 NOTE — ED TRIAGE NOTES
Patient comes in with high bp with chest pain.   He took his BP meds this AM.  [facility] [place].

## 2024-01-16 NOTE — DISCHARGE INSTRUCTIONS
Discharge Instructions  Chest Pain    You have been seen today for chest pain or discomfort.  At this time, your provider has found no signs that your chest pain is due to a serious or life-threatening condition, (or you have declined more testing and/or admission to the hospital). However, sometimes there is a serious problem that does not show up right away. Your evaluation today may not be complete and you may need further testing and evaluation.     Generally, every Emergency Department visit should have a follow-up clinic visit with either a primary or a specialty clinic/provider. Please follow-up as instructed by your emergency provider today.  Return to the Emergency Department if:  Your chest pain changes, gets worse, starts to happen more often, or comes with less activity.  You are newly short of breath.  You get very weak or tired.  You pass out or faint.  You have any new symptoms, like fever, cough, numb legs, or you cough up blood.  You have anything else that worries you.    Until you follow-up with your regular provider, please do the following:  Take one aspirin daily unless you have an allergy or are told not to by your provider.  If a stress test appointment has been made, go to the appointment.  If you have questions, contact your regular provider.  Follow-up with your regular provider/clinic as directed; this is very important.    If you were given a prescription for medicine here today, be sure to read all of the information (including the package insert) that comes with your prescription.  This will include important information about the medicine, its side effects, and any warnings that you need to know about.  The pharmacist who fills the prescription can provide more information and answer questions you may have about the medicine.  If you have questions or concerns that the pharmacist cannot address, please call or return to the Emergency Department.       Remember that you can always come  back to the Emergency Department if you are not able to see your regular provider in the amount of time listed above, if you get any new symptoms, or if there is anything that worries you.  Discharge Instructions  Hypertension - High Blood Pressure    During you visit to the Emergency Department, your blood pressure was higher than the recommended blood pressure.  This may be related to stress, pain, medication or other temporary conditions. In these cases, your blood pressure may return to normal on its own. If you have a history of high blood pressure, you may need to have your provider adjust your medications. Sometimes, your high measurement here may indicate that you have developed high blood pressure that will stay high unless it is treated. As a general rule, high blood pressure causes problems over years rather than days, weeks, or months. So, while it is important to treat blood pressure, it is rarely important to treat blood pressure immediately. Occasionally we will begin a medication in the Emergency Department; more often we will recommend close follow-up for medications with a primary doctor/clinic.    Generally, every Emergency Department visit should have a follow-up clinic visit with either a primary or a specialty clinic/provider. Please follow-up as instructed by your emergency provider today.    Return to the Emergency Department if you start to have:  A severe headache.  Chest pain.  Shortness of breath.  Weakness or numbness that affects one part of the body.  Confusion.  Vision changes.  Significant swelling of legs and/or eyes.  A reaction to any medication started in the Emergency Department.    What can I do to help myself?  Avoid alcohol.  Take any blood pressure medicine that you are prescribed.  Get a good night s sleep.  Lower your salt intake.  Exercise.  Lose weight.  Manage stress.  See your doctor regularly    If blood pressure medication was started in the Emergency  Department:  The medicine may not have an immediate effect. The body and brain determine what blood pressure you have. The medicine s job is to retrain the body s  thermostat  to a lower blood pressure.  You will need to follow up with your provider to see how this medicine is working for you.  If you were given a prescription for medicine here today, be sure to read all of the information (including the package insert) that comes with your prescription.  This will include important information about the medicine, its side effects, and any warnings that you need to know about.  The pharmacist who fills the prescription can provide more information and answer questions you may have about the medicine.  If you have questions or concerns that the pharmacist cannot address, please call or return to the Emergency Department.   Remember that you can always come back to the Emergency Department if you are not able to see your regular provider in the amount of time listed above, if you get any new symptoms, or if there is anything that worries you.  Return to the emergency department if symptoms are worsening, become concerning, or for any other concerns. Follow-up with your doctor in 2-3 and sooner if needed.

## 2024-04-12 ENCOUNTER — HOSPITAL ENCOUNTER (EMERGENCY)
Facility: CLINIC | Age: 44
Discharge: HOME | End: 2024-04-12
Attending: EMERGENCY MEDICINE | Admitting: EMERGENCY MEDICINE
Payer: MEDICAID

## 2024-04-12 VITALS
OXYGEN SATURATION: 97 % | HEART RATE: 68 BPM | SYSTOLIC BLOOD PRESSURE: 166 MMHG | DIASTOLIC BLOOD PRESSURE: 115 MMHG | TEMPERATURE: 97.8 F | RESPIRATION RATE: 18 BRPM

## 2024-04-12 DIAGNOSIS — I10 HYPERTENSION, UNSPECIFIED TYPE: ICD-10-CM

## 2024-04-12 DIAGNOSIS — J20.9 ACUTE BRONCHITIS, UNSPECIFIED ORGANISM: ICD-10-CM

## 2024-04-12 LAB
ANION GAP SERPL CALCULATED.3IONS-SCNC: 12 MMOL/L (ref 7–15)
BASOPHILS # BLD AUTO: 0 10E3/UL (ref 0–0.2)
BASOPHILS NFR BLD AUTO: 1 %
BUN SERPL-MCNC: 13 MG/DL (ref 6–20)
CALCIUM SERPL-MCNC: 9.2 MG/DL (ref 8.6–10)
CHLORIDE SERPL-SCNC: 104 MMOL/L (ref 98–107)
CREAT SERPL-MCNC: 0.74 MG/DL (ref 0.67–1.17)
DEPRECATED HCO3 PLAS-SCNC: 24 MMOL/L (ref 22–29)
EGFRCR SERPLBLD CKD-EPI 2021: >90 ML/MIN/1.73M2
EOSINOPHIL # BLD AUTO: 0.4 10E3/UL (ref 0–0.7)
EOSINOPHIL NFR BLD AUTO: 4 %
ERYTHROCYTE [DISTWIDTH] IN BLOOD BY AUTOMATED COUNT: 12.5 % (ref 10–15)
FLUAV RNA SPEC QL NAA+PROBE: NEGATIVE
FLUBV RNA RESP QL NAA+PROBE: NEGATIVE
GLUCOSE SERPL-MCNC: 159 MG/DL (ref 70–99)
HCT VFR BLD AUTO: 39.2 % (ref 40–53)
HGB BLD-MCNC: 13.2 G/DL (ref 13.3–17.7)
HOLD SPECIMEN: NORMAL
IMM GRANULOCYTES # BLD: 0 10E3/UL
IMM GRANULOCYTES NFR BLD: 1 %
LYMPHOCYTES # BLD AUTO: 3.9 10E3/UL (ref 0.8–5.3)
LYMPHOCYTES NFR BLD AUTO: 46 %
MCH RBC QN AUTO: 28.9 PG (ref 26.5–33)
MCHC RBC AUTO-ENTMCNC: 33.7 G/DL (ref 31.5–36.5)
MCV RBC AUTO: 86 FL (ref 78–100)
MONOCYTES # BLD AUTO: 0.7 10E3/UL (ref 0–1.3)
MONOCYTES NFR BLD AUTO: 8 %
NEUTROPHILS # BLD AUTO: 3.4 10E3/UL (ref 1.6–8.3)
NEUTROPHILS NFR BLD AUTO: 40 %
NRBC # BLD AUTO: 0 10E3/UL
NRBC BLD AUTO-RTO: 0 /100
PLATELET # BLD AUTO: 225 10E3/UL (ref 150–450)
POTASSIUM SERPL-SCNC: 3.6 MMOL/L (ref 3.4–5.3)
RBC # BLD AUTO: 4.56 10E6/UL (ref 4.4–5.9)
RSV RNA SPEC NAA+PROBE: NEGATIVE
SARS-COV-2 RNA RESP QL NAA+PROBE: NEGATIVE
SODIUM SERPL-SCNC: 140 MMOL/L (ref 135–145)
TROPONIN T SERPL HS-MCNC: <6 NG/L
WBC # BLD AUTO: 8.4 10E3/UL (ref 4–11)

## 2024-04-12 PROCEDURE — 84484 ASSAY OF TROPONIN QUANT: CPT | Performed by: EMERGENCY MEDICINE

## 2024-04-12 PROCEDURE — 80048 BASIC METABOLIC PNL TOTAL CA: CPT | Performed by: EMERGENCY MEDICINE

## 2024-04-12 PROCEDURE — 85049 AUTOMATED PLATELET COUNT: CPT | Performed by: EMERGENCY MEDICINE

## 2024-04-12 PROCEDURE — 87637 SARSCOV2&INF A&B&RSV AMP PRB: CPT | Performed by: EMERGENCY MEDICINE

## 2024-04-12 PROCEDURE — 82374 ASSAY BLOOD CARBON DIOXIDE: CPT | Performed by: EMERGENCY MEDICINE

## 2024-04-12 PROCEDURE — 99283 EMERGENCY DEPT VISIT LOW MDM: CPT

## 2024-04-12 PROCEDURE — 85025 COMPLETE CBC W/AUTO DIFF WBC: CPT | Performed by: EMERGENCY MEDICINE

## 2024-04-12 PROCEDURE — 36415 COLL VENOUS BLD VENIPUNCTURE: CPT | Performed by: EMERGENCY MEDICINE

## 2024-04-12 ASSESSMENT — COLUMBIA-SUICIDE SEVERITY RATING SCALE - C-SSRS
6. HAVE YOU EVER DONE ANYTHING, STARTED TO DO ANYTHING, OR PREPARED TO DO ANYTHING TO END YOUR LIFE?: NO
1. IN THE PAST MONTH, HAVE YOU WISHED YOU WERE DEAD OR WISHED YOU COULD GO TO SLEEP AND NOT WAKE UP?: NO
2. HAVE YOU ACTUALLY HAD ANY THOUGHTS OF KILLING YOURSELF IN THE PAST MONTH?: NO

## 2024-04-12 ASSESSMENT — ACTIVITIES OF DAILY LIVING (ADL)
ADLS_ACUITY_SCORE: 35

## 2024-04-13 NOTE — ED NOTES
Pt VSS, a little hypertensive. Pt reports some chest discomfort  due to frequent cough. Pt also reports productive cough, noted green/yellow colored sputum.

## 2024-04-13 NOTE — ED PROVIDER NOTES
History   Chief Complaint:  Cough    HPI     Carlos A Connor is a 43 year old male who presents for evaluation of approximately 5 days of a cough productive of greenish and yellowish phlegm.  He was slightly feverish and had some chills and sore throat at the start of the illness but that has resolved.  He is taking occasional Tylenol and ibuprofen.  No history of chronic lung problems.  He reports he has not used any drugs including cocaine or methamphetamine in a long time.  He does not smoke tobacco or marijuana and denies any history of heart disease.  He has chest pain only when he coughs.  He is primarily concerned that he might of had COVID, because his mother was previously in a coma for multiple months due to COVID.  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  He does not want a chest x-ray.  No recent antibiotics.  He states that he has a history of high blood pressure, he takes a blood pressure medicine prescribed to the [facility] in [place] approximately once per week, does not take it regularly, does not check his blood pressure at home.    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 Provider to review      Health Care Home 05/06/2014      Priority: Medium     Not Active in Care Coordination       Overweight (BMI 25.0-29.9) 03/15/2013     Priority: Medium    CARDIOVASCULAR SCREENING; LDL GOAL LESS THAN 160 10/28/2012     Priority: Medium    Panic attack 10/16/2012     Priority: Medium    Anxiety 10/03/2012     Priority: Medium      Physical Exam   Patient Vitals for the past 24 hrs:   BP Temp Temp src Pulse Resp SpO2   04/12/24 2256 -- -- -- -- -- 97 %   04/12/24 2255 (!) 166/115 -- -- 68 -- --   04/12/24 2015 (!) 181/114 97.8  F (36.6  C) Temporal 72 18 99 %      Physical Exam  Gen: Nontoxic-appearing male recumbent in the rney in room 14  HENT: mucous membranes moist, mastoids nontender, OP clear without swelling or exudate  Eyes: pupils normal, no scleral injection  CV: regular rhythm, cap refill normal  Resp: normal effort, speaks in full phrases, no stridor, occasional dry cough observed, lungs clear throughout, no wheezing  GI: abdomen soft and nontender, no guarding  MSK: no bony tenderness  Skin: appropriately warm and dry, no ecchymosis, no petechiae  Neuro: awake, alert, normal tone in extremities, no meningismus  Psych: cooperative    Emergency Department Course   Imaging:  No orders to display      Laboratory:  Labs Ordered and Resulted from Time of ED Arrival to Time of ED Departure   BASIC METABOLIC PANEL - Abnormal       Result Value    Sodium 140      Potassium 3.6      Chloride 104      Carbon Dioxide (CO2) 24      Anion Gap 12      Urea Nitrogen 13.0      Creatinine 0.74      GFR Estimate >90      Calcium 9.2      Glucose 159 (*)    CBC WITH PLATELETS AND DIFFERENTIAL - Abnormal    WBC Count 8.4      RBC Count 4.56      Hemoglobin 13.2 (*)     Hematocrit 39.2 (*)     MCV 86      MCH 28.9      MCHC 33.7      RDW 12.5      Platelet Count 225      % Neutrophils 40      % Lymphocytes 46      % Monocytes 8      % Eosinophils 4      % Basophils 1      % Immature Granulocytes 1      NRBCs per 100 WBC 0      Absolute Neutrophils 3.4       Absolute Lymphocytes 3.9      Absolute Monocytes 0.7      Absolute Eosinophils 0.4      Absolute Basophils 0.0      Absolute Immature Granulocytes 0.0      Absolute NRBCs 0.0     INFLUENZA A/B, RSV, & SARS-COV2 PCR - Normal    Influenza A PCR Negative      Influenza B PCR Negative      RSV PCR Negative      SARS CoV2 PCR Negative     TROPONIN T, HIGH SENSITIVITY - Normal    Troponin T, High Sensitivity <6        Emergency Department Course:  Reviewed:  I reviewed nursing notes, vitals, and past medical history    Assessments/Consultations/Discussion of Management or Tests :  I obtained history and examined the patient as noted above.      Interventions:  Medications - No data to display     Social Determinants of Health affecting care:   Healthcare Access/Compliance    Disposition:  Discharged    Impression & Plan    Medical Decision Making:  Symptoms are highly suggestive of an acute respiratory infection.  He does not have hypoxia, I personally checked his oxygen level during my exam and it was 98% on room air.  No wheezing to suggest a major contribution for bronchospasm, also highly doubt pulmonary edema or pneumothorax.  We discussed possibility of chest x-ray to evaluate for bacterial infiltrate though he politely declined this.  Given his reassuring vitals, other than hypertension, I think this is reasonable.  Regarding his hypertension, he has a history of this, and reports suboptimal compliance with his home medication whose name he does not know.  Given that there are no signs of acute endorgan damage with no signs or symptoms of stroke, normal creatinine, and completely undetectable troponin, I think that emergent lowering of his blood pressure is not indicated.  He has a benign neurologic exam.  The importance of taking his blood pressure medicine and follow-up through clinic was reviewed with him.  His primary concern was to make sure he did not have COVID, COVID swab is negative.  He is afebrile at  this time and I think that ongoing outpatient management is appropriate, he agrees and was subsequently discharged.  Return here for sudden worsening at any hour.    Diagnosis:    ICD-10-CM    1. Acute bronchitis, unspecified organism  J20.9       2. Hypertension, unspecified type  I10          4/12/2024   MD Pam Harris Jeffrey Alan, MD  04/12/24 2316

## 2024-04-13 NOTE — ED TRIAGE NOTES
Patient c/o of SOB, frequent coughing and dizziness for 4 days.     Triage Assessment (Adult)       Row Name 04/12/24 2018          Triage Assessment    Airway WDL WDL        Respiratory WDL    Respiratory WDL WDL        Skin Circulation/Temperature WDL    Skin Circulation/Temperature WDL WDL        Cardiac WDL    Cardiac WDL X  HTN, did not take his BP meds today        Peripheral/Neurovascular WDL    Peripheral Neurovascular WDL WDL        Cognitive/Neuro/Behavioral WDL    Cognitive/Neuro/Behavioral WDL WDL

## 2024-04-18 ENCOUNTER — HOSPITAL ENCOUNTER (EMERGENCY)
Facility: CLINIC | Age: 44
Discharge: HOME | End: 2024-04-19
Attending: EMERGENCY MEDICINE | Admitting: EMERGENCY MEDICINE
Payer: MEDICAID

## 2024-04-18 DIAGNOSIS — J06.9 VIRAL URI: ICD-10-CM

## 2024-04-18 LAB
ALBUMIN SERPL BCG-MCNC: 4.5 G/DL (ref 3.5–5.2)
ALP SERPL-CCNC: 110 U/L (ref 40–150)
ALT SERPL W P-5'-P-CCNC: 34 U/L (ref 0–70)
ANION GAP SERPL CALCULATED.3IONS-SCNC: 13 MMOL/L (ref 7–15)
AST SERPL W P-5'-P-CCNC: 18 U/L (ref 0–45)
BILIRUB SERPL-MCNC: 0.5 MG/DL
BUN SERPL-MCNC: 17.6 MG/DL (ref 6–20)
CALCIUM SERPL-MCNC: 9.4 MG/DL (ref 8.6–10)
CHLORIDE SERPL-SCNC: 102 MMOL/L (ref 98–107)
CREAT SERPL-MCNC: 1.09 MG/DL (ref 0.67–1.17)
DEPRECATED HCO3 PLAS-SCNC: 25 MMOL/L (ref 22–29)
EGFRCR SERPLBLD CKD-EPI 2021: 87 ML/MIN/1.73M2
ERYTHROCYTE [DISTWIDTH] IN BLOOD BY AUTOMATED COUNT: 12.3 % (ref 10–15)
GLUCOSE SERPL-MCNC: 116 MG/DL (ref 70–99)
HCT VFR BLD AUTO: 40.9 % (ref 40–53)
HGB BLD-MCNC: 13.9 G/DL (ref 13.3–17.7)
HOLD SPECIMEN: NORMAL
MCH RBC QN AUTO: 29 PG (ref 26.5–33)
MCHC RBC AUTO-ENTMCNC: 34 G/DL (ref 31.5–36.5)
MCV RBC AUTO: 85 FL (ref 78–100)
PLATELET # BLD AUTO: 215 10E3/UL (ref 150–450)
POTASSIUM SERPL-SCNC: 3.7 MMOL/L (ref 3.4–5.3)
PROT SERPL-MCNC: 7.3 G/DL (ref 6.4–8.3)
RBC # BLD AUTO: 4.8 10E6/UL (ref 4.4–5.9)
SODIUM SERPL-SCNC: 140 MMOL/L (ref 135–145)
TROPONIN T SERPL HS-MCNC: <6 NG/L
WBC # BLD AUTO: 8.3 10E3/UL (ref 4–11)

## 2024-04-18 PROCEDURE — 36415 COLL VENOUS BLD VENIPUNCTURE: CPT | Performed by: EMERGENCY MEDICINE

## 2024-04-18 PROCEDURE — 84484 ASSAY OF TROPONIN QUANT: CPT | Performed by: EMERGENCY MEDICINE

## 2024-04-18 PROCEDURE — 85027 COMPLETE CBC AUTOMATED: CPT | Performed by: EMERGENCY MEDICINE

## 2024-04-18 PROCEDURE — 80053 COMPREHEN METABOLIC PANEL: CPT | Performed by: EMERGENCY MEDICINE

## 2024-04-18 PROCEDURE — 99284 EMERGENCY DEPT VISIT MOD MDM: CPT

## 2024-04-18 PROCEDURE — 93005 ELECTROCARDIOGRAM TRACING: CPT

## 2024-04-18 ASSESSMENT — ACTIVITIES OF DAILY LIVING (ADL)
ADLS_ACUITY_SCORE: 35

## 2024-04-19 VITALS
HEIGHT: 71 IN | WEIGHT: 210 LBS | TEMPERATURE: 98.1 F | SYSTOLIC BLOOD PRESSURE: 162 MMHG | OXYGEN SATURATION: 97 % | HEART RATE: 74 BPM | DIASTOLIC BLOOD PRESSURE: 109 MMHG | RESPIRATION RATE: 16 BRPM | BODY MASS INDEX: 29.4 KG/M2

## 2024-04-19 LAB
ATRIAL RATE - MUSE: 67 BPM
DIASTOLIC BLOOD PRESSURE - MUSE: NORMAL MMHG
INTERPRETATION ECG - MUSE: NORMAL
P AXIS - MUSE: 57 DEGREES
PR INTERVAL - MUSE: 140 MS
QRS DURATION - MUSE: 90 MS
QT - MUSE: 420 MS
QTC - MUSE: 443 MS
R AXIS - MUSE: 23 DEGREES
SYSTOLIC BLOOD PRESSURE - MUSE: NORMAL MMHG
T AXIS - MUSE: -14 DEGREES
VENTRICULAR RATE- MUSE: 67 BPM

## 2024-04-19 NOTE — ED PROVIDER NOTES
"  History     Chief Complaint:  Chest Pain, Headache, and Dizziness       HPI   Carlos A Connor is a 43 year old male who presents to the emergency department for chest pain and headache. The present concern has been ongoing for the past 3-4 days. Carlos A has had several moments of near syncope with vision blurriness in addition to cough, sore throat, eye redness and ear discomfort. Of note, he was here last week for a sore throat. Not found to have COVID at the time. The patient does not take his blood pressure medication regularly, only 1 tablet of propranolol as needed, though advised otherwise by PCP. Checks blood pressure regularly. No sick contacts, vomiting or diarrhea. Of note, Carlos A works as a  and has limited sick time.    Independent Historian:   None - Patient Only    Review of External Notes:   None     Medications:    Toradol  Propranolol  Ativan  Paxil    Past Medical History:    Anxiety  Chest pain  Migraines    Past Surgical History:    No prior surgical history to report.    Physical Exam   Patient Vitals for the past 24 hrs:   BP Temp Temp src Pulse Resp SpO2 Height Weight   04/18/24 1822 (!) 151/92 98.1  F (36.7  C) Oral 72 16 97 % 1.803 m (5' 11\") 95.3 kg (210 lb)        Physical Exam    Gen: well appearing, in no acute distress  Oral : Mucous membranes moist,   Nose: No rhinorhea  Ears: External near normal, right TM bulging without drainage  Eyes: bilateral conjunctivitis without drainage  Neck: supple, no abnormal swelling  Lungs: Clear bilaterally, no tachypnea or distress, speaks full sentences  CV: Regular rate, regular rhythm  Abd: soft, nontender, nondistended, no rebound/guarding  Ext: no lower extremity edema  Skin: warm, dry, well perfused, no rashes/bruising/lesions on exposed skin  Neuro: alert, no gross motor or sensory deficits,   Psych: pleasant mood, normal affect    Emergency Department Course   ECG  ECG taken at 1814, ECG read at 1826  Normal sinus " rhythm  Nonspecific T wave abnormality  Abnormal ECG   Rate 67 bpm. OR interval 140 ms. QRS duration 90 ms. QT/QTc 420/443 ms. P-R-T axes 57 23 -14.     Laboratory:  Labs Ordered and Resulted from Time of ED Arrival to Time of ED Departure   COMPREHENSIVE METABOLIC PANEL - Abnormal       Result Value    Sodium 140      Potassium 3.7      Carbon Dioxide (CO2) 25      Anion Gap 13      Urea Nitrogen 17.6      Creatinine 1.09      GFR Estimate 87      Calcium 9.4      Chloride 102      Glucose 116 (*)     Alkaline Phosphatase 110      AST 18      ALT 34      Protein Total 7.3      Albumin 4.5      Bilirubin Total 0.5     TROPONIN T, HIGH SENSITIVITY - Normal    Troponin T, High Sensitivity <6     CBC WITH PLATELETS - Normal    WBC Count 8.3      RBC Count 4.80      Hemoglobin 13.9      Hematocrit 40.9      MCV 85      MCH 29.0      MCHC 34.0      RDW 12.3      Platelet Count 215          Procedures   None    Emergency Department Course & Assessments:    Interventions:  Medications - No data to display     Independent Interpretation (X-rays, CTs, rhythm strip):  None    Assessments/Consultations/Discussion of Management or Tests:     ED Course as of 04/19/24 0044   Fri Apr 19, 2024   0032 I saw the patient for an initial evaluation and exam.       Social Determinants of Health affecting care:   None    Disposition:  The patient was discharged.     Impression & Plan    CMS Diagnoses: None       Medical Decision Making:  Patient presents emergency department with symptoms consistent with viral upper respiratory infection.  Also has concerns about his blood pressure being high but reports he is not taking his propranolol daily as directed he takes it intermittently.  I have very low suspicion for meningitis or life-threatening infection at this time.  His symptoms are consistent with viral upper respiratory infection and there is no evidence of hypertensive emergency.  Recommended to the patient to continue to take his  medication as directed, would anticipate improvement in symptoms on their own over the next 5 to 7 days.  Patient declined a work excuse.      Diagnosis:    ICD-10-CM    1. Viral URI  J06.9            Discharge Medications:  New Prescriptions    No medications on file          Scribe Disclosure:  I, Oscar Teague, am serving as a scribe at 12:28 AM on 4/19/2024 to document services personally performed by Asif Galdamez MD based on my observations and the provider's statements to me.      Asif Galdamez MD  04/19/24 0247

## (undated) RX ORDER — REGADENOSON 0.08 MG/ML
INJECTION, SOLUTION INTRAVENOUS
Status: DISPENSED
Start: 2020-11-10